# Patient Record
Sex: FEMALE | Race: WHITE | NOT HISPANIC OR LATINO | Employment: UNEMPLOYED | URBAN - METROPOLITAN AREA
[De-identification: names, ages, dates, MRNs, and addresses within clinical notes are randomized per-mention and may not be internally consistent; named-entity substitution may affect disease eponyms.]

---

## 2016-06-27 LAB
EXTERNAL ABO GROUPING: NORMAL
EXTERNAL RH FACTOR: POSITIVE

## 2016-08-17 LAB
EXTERNAL CHLAMYDIA SCREEN: NORMAL
EXTERNAL GONORRHEA SCREEN: NORMAL

## 2017-01-06 PROBLEM — F41.9 ANXIETY: Status: ACTIVE | Noted: 2017-01-06

## 2017-01-12 DIAGNOSIS — Z98.891 STATUS POST CESAREAN DELIVERY: Primary | ICD-10-CM

## 2017-01-30 ENCOUNTER — TELEPHONE (OUTPATIENT)
Dept: OBSTETRICS AND GYNECOLOGY | Facility: CLINIC | Age: 27
End: 2017-01-30

## 2017-01-30 NOTE — TELEPHONE ENCOUNTER
----- Message from Dottie Castro sent at 1/30/2017 10:16 AM EST -----  Regarding: [facility]  Contact: [phone]  DR MA PT LOST MUCUS PLUG DURING THE NIGHT NOW SHE IS HAVING CRAMPING/CONTRACTIONS THAT ARE NOT CONSISTANT. SHOULD SHE COME TO LABOR KIM OR WAIT    [phone] 37 wks 1 day pt states contractions are not regular, they are so far spaced out that she is not timing them. Advised pt to rest and increase fluids.

## 2017-01-31 ENCOUNTER — ANESTHESIA EVENT (OUTPATIENT)
Dept: LABOR AND DELIVERY | Facility: HOSPITAL | Age: 27
End: 2017-01-31

## 2017-01-31 ENCOUNTER — HOSPITAL ENCOUNTER (INPATIENT)
Facility: HOSPITAL | Age: 27
LOS: 4 days | Discharge: HOME | End: 2017-02-04
Attending: OBSTETRICS & GYNECOLOGY | Admitting: OBSTETRICS & GYNECOLOGY

## 2017-01-31 DIAGNOSIS — Z98.891 S/P CESAREAN SECTION: Primary | ICD-10-CM

## 2017-01-31 PROBLEM — Z37.9 NORMAL LABOR: Status: ACTIVE | Noted: 2017-01-31

## 2017-01-31 LAB
DEPRECATED RDW RBC AUTO: 41.6 FL (ref 37–54)
ERYTHROCYTE [DISTWIDTH] IN BLOOD BY AUTOMATED COUNT: 12.3 % (ref 11.3–14.5)
HCT VFR BLD AUTO: 37 % (ref 34.5–44)
HGB BLD-MCNC: 12.7 G/DL (ref 11.5–15.5)
MCH RBC QN AUTO: 32.2 PG (ref 27–31)
MCHC RBC AUTO-ENTMCNC: 34.3 G/DL (ref 32–36)
MCV RBC AUTO: 93.7 FL (ref 80–99)
PLATELET # BLD AUTO: 205 10*3/MM3 (ref 150–450)
PMV BLD AUTO: 8.5 FL (ref 6–12)
RBC # BLD AUTO: 3.95 10*6/MM3 (ref 3.89–5.14)
WBC NRBC COR # BLD: 15.28 10*3/MM3 (ref 3.5–10.8)

## 2017-01-31 PROCEDURE — 85025 COMPLETE CBC W/AUTO DIFF WBC: CPT | Performed by: OBSTETRICS & GYNECOLOGY

## 2017-01-31 PROCEDURE — 86900 BLOOD TYPING SEROLOGIC ABO: CPT

## 2017-01-31 PROCEDURE — 25010000002 DIPHENHYDRAMINE PER 50 MG: Performed by: ANESTHESIOLOGY

## 2017-01-31 PROCEDURE — 86901 BLOOD TYPING SEROLOGIC RH(D): CPT

## 2017-01-31 PROCEDURE — 25010000002 MIDAZOLAM PER 1 MG: Performed by: ANESTHESIOLOGY

## 2017-01-31 PROCEDURE — 86850 RBC ANTIBODY SCREEN: CPT

## 2017-01-31 PROCEDURE — 85007 BL SMEAR W/DIFF WBC COUNT: CPT | Performed by: OBSTETRICS & GYNECOLOGY

## 2017-01-31 PROCEDURE — 85027 COMPLETE CBC AUTOMATED: CPT | Performed by: OBSTETRICS & GYNECOLOGY

## 2017-01-31 PROCEDURE — 25010000002 METHYLPREDNISOLONE PER 125 MG: Performed by: ANESTHESIOLOGY

## 2017-01-31 PROCEDURE — 59025 FETAL NON-STRESS TEST: CPT

## 2017-01-31 RX ORDER — METHYLPREDNISOLONE SODIUM SUCCINATE 125 MG/2ML
INJECTION, POWDER, LYOPHILIZED, FOR SOLUTION INTRAMUSCULAR; INTRAVENOUS AS NEEDED
Status: DISCONTINUED | OUTPATIENT
Start: 2017-01-31 | End: 2017-02-01 | Stop reason: SURG

## 2017-01-31 RX ORDER — FLUTICASONE PROPIONATE 50 MCG
2 SPRAY, SUSPENSION (ML) NASAL DAILY
COMMUNITY
End: 2019-05-24

## 2017-01-31 RX ORDER — SODIUM CHLORIDE 0.9 % (FLUSH) 0.9 %
1-10 SYRINGE (ML) INJECTION AS NEEDED
Status: DISCONTINUED | OUTPATIENT
Start: 2017-01-31 | End: 2017-02-01 | Stop reason: HOSPADM

## 2017-01-31 RX ORDER — SODIUM CHLORIDE, SODIUM LACTATE, POTASSIUM CHLORIDE, CALCIUM CHLORIDE 600; 310; 30; 20 MG/100ML; MG/100ML; MG/100ML; MG/100ML
125 INJECTION, SOLUTION INTRAVENOUS CONTINUOUS
Status: DISCONTINUED | OUTPATIENT
Start: 2017-02-01 | End: 2017-02-01

## 2017-01-31 RX ORDER — CEFAZOLIN SODIUM 2 G/100ML
2 INJECTION, SOLUTION INTRAVENOUS ONCE
Status: COMPLETED | OUTPATIENT
Start: 2017-02-01 | End: 2017-02-01

## 2017-01-31 RX ORDER — DIPHENHYDRAMINE HYDROCHLORIDE 50 MG/ML
INJECTION INTRAMUSCULAR; INTRAVENOUS AS NEEDED
Status: DISCONTINUED | OUTPATIENT
Start: 2017-01-31 | End: 2017-02-01 | Stop reason: SURG

## 2017-01-31 RX ORDER — CETIRIZINE HYDROCHLORIDE 5 MG/1
5 TABLET ORAL DAILY
COMMUNITY
End: 2019-05-24

## 2017-01-31 RX ADMIN — MIDAZOLAM HYDROCHLORIDE 1 MG: 1 INJECTION, SOLUTION INTRAMUSCULAR; INTRAVENOUS at 23:57

## 2017-01-31 RX ADMIN — SODIUM CHLORIDE, POTASSIUM CHLORIDE, SODIUM LACTATE AND CALCIUM CHLORIDE: 600; 310; 30; 20 INJECTION, SOLUTION INTRAVENOUS at 23:56

## 2017-01-31 RX ADMIN — SODIUM CHLORIDE, POTASSIUM CHLORIDE, SODIUM LACTATE AND CALCIUM CHLORIDE 1000 ML: 600; 310; 30; 20 INJECTION, SOLUTION INTRAVENOUS at 23:23

## 2017-01-31 RX ADMIN — METHYLPREDNISOLONE SODIUM SUCCINATE 125 MG: 125 INJECTION, POWDER, FOR SOLUTION INTRAMUSCULAR; INTRAVENOUS at 23:50

## 2017-01-31 RX ADMIN — DIPHENHYDRAMINE HYDROCHLORIDE 25 MG: 50 INJECTION INTRAMUSCULAR; INTRAVENOUS at 23:47

## 2017-01-31 RX ADMIN — CEFAZOLIN SODIUM 2 G: 2 INJECTION, SOLUTION INTRAVENOUS at 23:37

## 2017-02-01 ENCOUNTER — ANESTHESIA (OUTPATIENT)
Dept: LABOR AND DELIVERY | Facility: HOSPITAL | Age: 27
End: 2017-02-01

## 2017-02-01 LAB
ABO GROUP BLD: NORMAL
BLD GP AB SCN SERPL QL: NEGATIVE
RH BLD: POSITIVE

## 2017-02-01 PROCEDURE — 59514 CESAREAN DELIVERY ONLY: CPT | Performed by: OBSTETRICS & GYNECOLOGY

## 2017-02-01 PROCEDURE — 25010000002 KETOROLAC TROMETHAMINE PER 15 MG: Performed by: OBSTETRICS & GYNECOLOGY

## 2017-02-01 PROCEDURE — 25010000002 ONDANSETRON PER 1 MG: Performed by: ANESTHESIOLOGY

## 2017-02-01 PROCEDURE — 25010000002 FENTANYL CITRATE (PF) 100 MCG/2ML SOLUTION: Performed by: ANESTHESIOLOGY

## 2017-02-01 PROCEDURE — 25010000003 CEFAZOLIN IN DEXTROSE 2-4 GM/100ML-% SOLUTION: Performed by: OBSTETRICS & GYNECOLOGY

## 2017-02-01 PROCEDURE — 25010000003 MORPHINE PER 10 MG: Performed by: ANESTHESIOLOGY

## 2017-02-01 RX ORDER — BISACODYL 10 MG
10 SUPPOSITORY, RECTAL RECTAL DAILY PRN
Status: DISCONTINUED | OUTPATIENT
Start: 2017-02-01 | End: 2017-02-04 | Stop reason: HOSPADM

## 2017-02-01 RX ORDER — DIPHENHYDRAMINE HCL 25 MG
25 CAPSULE ORAL EVERY 4 HOURS PRN
Status: DISCONTINUED | OUTPATIENT
Start: 2017-02-01 | End: 2017-02-04 | Stop reason: HOSPADM

## 2017-02-01 RX ORDER — OXYTOCIN/RINGER'S LACTATE 20/1000 ML
999 PLASTIC BAG, INJECTION (ML) INTRAVENOUS ONCE
Status: DISCONTINUED | OUTPATIENT
Start: 2017-02-01 | End: 2017-02-01 | Stop reason: HOSPADM

## 2017-02-01 RX ORDER — ONDANSETRON 4 MG/1
4 TABLET, FILM COATED ORAL EVERY 8 HOURS PRN
Status: DISCONTINUED | OUTPATIENT
Start: 2017-02-01 | End: 2017-02-01 | Stop reason: SDUPTHER

## 2017-02-01 RX ORDER — CARBOPROST TROMETHAMINE 250 UG/ML
250 INJECTION, SOLUTION INTRAMUSCULAR ONCE
Status: DISCONTINUED | OUTPATIENT
Start: 2017-02-01 | End: 2017-02-04 | Stop reason: HOSPADM

## 2017-02-01 RX ORDER — PROMETHAZINE HYDROCHLORIDE 25 MG/ML
12.5 INJECTION, SOLUTION INTRAMUSCULAR; INTRAVENOUS EVERY 6 HOURS PRN
Status: DISCONTINUED | OUTPATIENT
Start: 2017-02-01 | End: 2017-02-01

## 2017-02-01 RX ORDER — KETOROLAC TROMETHAMINE 15 MG/ML
15 INJECTION, SOLUTION INTRAMUSCULAR; INTRAVENOUS EVERY 6 HOURS PRN
Status: DISCONTINUED | OUTPATIENT
Start: 2017-02-01 | End: 2017-02-01

## 2017-02-01 RX ORDER — MAGNESIUM HYDROXIDE/ALUMINUM HYDROXICE/SIMETHICONE 120; 1200; 1200 MG/30ML; MG/30ML; MG/30ML
30 SUSPENSION ORAL EVERY 4 HOURS PRN
Status: DISCONTINUED | OUTPATIENT
Start: 2017-02-01 | End: 2017-02-04 | Stop reason: HOSPADM

## 2017-02-01 RX ORDER — ONDANSETRON 4 MG/1
4 TABLET, FILM COATED ORAL EVERY 6 HOURS PRN
Status: DISCONTINUED | OUTPATIENT
Start: 2017-02-01 | End: 2017-02-04 | Stop reason: HOSPADM

## 2017-02-01 RX ORDER — MISOPROSTOL 200 UG/1
600 TABLET ORAL ONCE
Status: DISCONTINUED | OUTPATIENT
Start: 2017-02-01 | End: 2017-02-04 | Stop reason: HOSPADM

## 2017-02-01 RX ORDER — PRENATAL WITH FERROUS FUM AND FOLIC ACID 3080; 920; 120; 400; 22; 1.84; 3; 20; 10; 1; 12; 200; 27; 25; 2 [IU]/1; [IU]/1; MG/1; [IU]/1; MG/1; MG/1; MG/1; MG/1; MG/1; MG/1; UG/1; MG/1; MG/1; MG/1; MG/1
1 TABLET ORAL DAILY
Status: DISCONTINUED | OUTPATIENT
Start: 2017-02-01 | End: 2017-02-04 | Stop reason: HOSPADM

## 2017-02-01 RX ORDER — KETOROLAC TROMETHAMINE 15 MG/ML
15 INJECTION, SOLUTION INTRAMUSCULAR; INTRAVENOUS EVERY 6 HOURS PRN
Status: COMPLETED | OUTPATIENT
Start: 2017-02-02 | End: 2017-02-02

## 2017-02-01 RX ORDER — CETIRIZINE HYDROCHLORIDE 10 MG/1
5 TABLET ORAL DAILY
Status: DISCONTINUED | OUTPATIENT
Start: 2017-02-01 | End: 2017-02-04 | Stop reason: HOSPADM

## 2017-02-01 RX ORDER — SODIUM CHLORIDE 0.9 % (FLUSH) 0.9 %
1-10 SYRINGE (ML) INJECTION AS NEEDED
Status: DISCONTINUED | OUTPATIENT
Start: 2017-02-01 | End: 2017-02-04 | Stop reason: HOSPADM

## 2017-02-01 RX ORDER — DIPHENHYDRAMINE HYDROCHLORIDE 50 MG/ML
25 INJECTION INTRAMUSCULAR; INTRAVENOUS EVERY 4 HOURS PRN
Status: DISCONTINUED | OUTPATIENT
Start: 2017-02-01 | End: 2017-02-01

## 2017-02-01 RX ORDER — HYDROXYZINE HYDROCHLORIDE 25 MG/1
50 TABLET, FILM COATED ORAL EVERY 6 HOURS PRN
Status: DISCONTINUED | OUTPATIENT
Start: 2017-02-01 | End: 2017-02-04 | Stop reason: HOSPADM

## 2017-02-01 RX ORDER — SIMETHICONE 80 MG
80 TABLET,CHEWABLE ORAL 4 TIMES DAILY PRN
Status: DISCONTINUED | OUTPATIENT
Start: 2017-02-01 | End: 2017-02-04 | Stop reason: HOSPADM

## 2017-02-01 RX ORDER — OXYTOCIN 10 [USP'U]/ML
INJECTION, SOLUTION INTRAMUSCULAR; INTRAVENOUS AS NEEDED
Status: DISCONTINUED | OUTPATIENT
Start: 2017-02-01 | End: 2017-02-01 | Stop reason: SURG

## 2017-02-01 RX ORDER — PROMETHAZINE HYDROCHLORIDE 25 MG/ML
12.5 INJECTION, SOLUTION INTRAMUSCULAR; INTRAVENOUS EVERY 6 HOURS PRN
Status: DISCONTINUED | OUTPATIENT
Start: 2017-02-01 | End: 2017-02-04 | Stop reason: HOSPADM

## 2017-02-01 RX ORDER — ACETAMINOPHEN 160 MG/5ML
650 SOLUTION ORAL EVERY 4 HOURS PRN
Status: DISCONTINUED | OUTPATIENT
Start: 2017-02-01 | End: 2017-02-04 | Stop reason: HOSPADM

## 2017-02-01 RX ORDER — METHYLERGONOVINE MALEATE 0.2 MG/ML
200 INJECTION INTRAVENOUS ONCE
Status: DISCONTINUED | OUTPATIENT
Start: 2017-02-01 | End: 2017-02-04 | Stop reason: HOSPADM

## 2017-02-01 RX ORDER — CEFAZOLIN SODIUM 2 G/100ML
2 INJECTION, SOLUTION INTRAVENOUS EVERY 8 HOURS
Status: COMPLETED | OUTPATIENT
Start: 2017-02-01 | End: 2017-02-01

## 2017-02-01 RX ORDER — IBUPROFEN 600 MG/1
600 TABLET ORAL EVERY 6 HOURS PRN
Status: DISCONTINUED | OUTPATIENT
Start: 2017-02-01 | End: 2017-02-01

## 2017-02-01 RX ORDER — METOCLOPRAMIDE HYDROCHLORIDE 5 MG/ML
10 INJECTION INTRAMUSCULAR; INTRAVENOUS EVERY 4 HOURS PRN
Status: DISCONTINUED | OUTPATIENT
Start: 2017-02-01 | End: 2017-02-01

## 2017-02-01 RX ORDER — HYDROCODONE BITARTRATE AND ACETAMINOPHEN 7.5; 325 MG/1; MG/1
1 TABLET ORAL EVERY 4 HOURS PRN
Status: DISCONTINUED | OUTPATIENT
Start: 2017-02-01 | End: 2017-02-01

## 2017-02-01 RX ORDER — ONDANSETRON 2 MG/ML
4 INJECTION INTRAMUSCULAR; INTRAVENOUS EVERY 6 HOURS PRN
Status: DISCONTINUED | OUTPATIENT
Start: 2017-02-01 | End: 2017-02-01 | Stop reason: SDUPTHER

## 2017-02-01 RX ORDER — HYDROMORPHONE HYDROCHLORIDE 1 MG/ML
0.5 INJECTION, SOLUTION INTRAMUSCULAR; INTRAVENOUS; SUBCUTANEOUS
Status: DISCONTINUED | OUTPATIENT
Start: 2017-02-01 | End: 2017-02-04 | Stop reason: HOSPADM

## 2017-02-01 RX ORDER — ONDANSETRON 2 MG/ML
4 INJECTION INTRAMUSCULAR; INTRAVENOUS EVERY 6 HOURS PRN
Status: DISCONTINUED | OUTPATIENT
Start: 2017-02-01 | End: 2017-02-04 | Stop reason: HOSPADM

## 2017-02-01 RX ORDER — FERROUS SULFATE 325(65) MG
325 TABLET ORAL
Status: DISCONTINUED | OUTPATIENT
Start: 2017-02-01 | End: 2017-02-04 | Stop reason: HOSPADM

## 2017-02-01 RX ORDER — BUPIVACAINE HYDROCHLORIDE 7.5 MG/ML
INJECTION, SOLUTION EPIDURAL; RETROBULBAR AS NEEDED
Status: DISCONTINUED | OUTPATIENT
Start: 2017-02-01 | End: 2017-02-01 | Stop reason: SURG

## 2017-02-01 RX ORDER — NALOXONE HCL 0.4 MG/ML
0.1 VIAL (ML) INJECTION
Status: DISCONTINUED | OUTPATIENT
Start: 2017-02-01 | End: 2017-02-04 | Stop reason: HOSPADM

## 2017-02-01 RX ORDER — ONDANSETRON 2 MG/ML
4 INJECTION INTRAMUSCULAR; INTRAVENOUS ONCE AS NEEDED
Status: DISCONTINUED | OUTPATIENT
Start: 2017-02-01 | End: 2017-02-01

## 2017-02-01 RX ORDER — ACETAMINOPHEN 650 MG/1
650 SUPPOSITORY RECTAL EVERY 4 HOURS PRN
Status: DISCONTINUED | OUTPATIENT
Start: 2017-02-01 | End: 2017-02-04 | Stop reason: HOSPADM

## 2017-02-01 RX ORDER — ACETAMINOPHEN 325 MG/1
650 TABLET ORAL EVERY 4 HOURS PRN
Status: DISCONTINUED | OUTPATIENT
Start: 2017-02-01 | End: 2017-02-04 | Stop reason: HOSPADM

## 2017-02-01 RX ORDER — HYDROCODONE BITARTRATE AND ACETAMINOPHEN 5; 325 MG/1; MG/1
1 TABLET ORAL EVERY 4 HOURS PRN
Status: DISCONTINUED | OUTPATIENT
Start: 2017-02-01 | End: 2017-02-01

## 2017-02-01 RX ORDER — CARBOPROST TROMETHAMINE 250 UG/ML
250 INJECTION, SOLUTION INTRAMUSCULAR AS NEEDED
Status: DISCONTINUED | OUTPATIENT
Start: 2017-02-01 | End: 2017-02-01 | Stop reason: HOSPADM

## 2017-02-01 RX ORDER — DOCUSATE SODIUM 100 MG/1
100 CAPSULE, LIQUID FILLED ORAL 2 TIMES DAILY PRN
Status: DISCONTINUED | OUTPATIENT
Start: 2017-02-01 | End: 2017-02-04 | Stop reason: HOSPADM

## 2017-02-01 RX ORDER — ZOLPIDEM TARTRATE 5 MG/1
5 TABLET ORAL NIGHTLY PRN
Status: DISCONTINUED | OUTPATIENT
Start: 2017-02-01 | End: 2017-02-04 | Stop reason: HOSPADM

## 2017-02-01 RX ORDER — MISOPROSTOL 200 UG/1
800 TABLET ORAL AS NEEDED
Status: DISCONTINUED | OUTPATIENT
Start: 2017-02-01 | End: 2017-02-01 | Stop reason: HOSPADM

## 2017-02-01 RX ORDER — METHYLERGONOVINE MALEATE 0.2 MG/ML
200 INJECTION INTRAVENOUS ONCE AS NEEDED
Status: DISCONTINUED | OUTPATIENT
Start: 2017-02-01 | End: 2017-02-01 | Stop reason: HOSPADM

## 2017-02-01 RX ORDER — OXYTOCIN/RINGER'S LACTATE 20/1000 ML
125 PLASTIC BAG, INJECTION (ML) INTRAVENOUS AS NEEDED
Status: DISCONTINUED | OUTPATIENT
Start: 2017-02-01 | End: 2017-02-01 | Stop reason: HOSPADM

## 2017-02-01 RX ORDER — LANOLIN 100 %
OINTMENT (GRAM) TOPICAL
Status: DISCONTINUED | OUTPATIENT
Start: 2017-02-01 | End: 2017-02-04 | Stop reason: HOSPADM

## 2017-02-01 RX ORDER — ONDANSETRON 2 MG/ML
INJECTION INTRAMUSCULAR; INTRAVENOUS AS NEEDED
Status: DISCONTINUED | OUTPATIENT
Start: 2017-02-01 | End: 2017-02-01 | Stop reason: SURG

## 2017-02-01 RX ORDER — DIPHENHYDRAMINE HCL 25 MG
25 CAPSULE ORAL EVERY 4 HOURS PRN
Status: DISCONTINUED | OUTPATIENT
Start: 2017-02-01 | End: 2017-02-01

## 2017-02-01 RX ORDER — NALOXONE HCL 0.4 MG/ML
0.4 VIAL (ML) INJECTION
Status: DISCONTINUED | OUTPATIENT
Start: 2017-02-01 | End: 2017-02-01

## 2017-02-01 RX ORDER — PRENATAL WITH FERROUS FUM AND FOLIC ACID 3080; 920; 120; 400; 22; 1.84; 3; 20; 10; 1; 12; 200; 27; 25; 2 [IU]/1; [IU]/1; MG/1; [IU]/1; MG/1; MG/1; MG/1; MG/1; MG/1; MG/1; UG/1; MG/1; MG/1; MG/1; MG/1
1 TABLET ORAL DAILY
Status: DISCONTINUED | OUTPATIENT
Start: 2017-02-01 | End: 2017-02-01 | Stop reason: SDUPTHER

## 2017-02-01 RX ORDER — MORPHINE SULFATE 0.5 MG/ML
INJECTION, SOLUTION EPIDURAL; INTRATHECAL; INTRAVENOUS AS NEEDED
Status: DISCONTINUED | OUTPATIENT
Start: 2017-02-01 | End: 2017-02-01 | Stop reason: SURG

## 2017-02-01 RX ORDER — FENTANYL CITRATE 50 UG/ML
INJECTION, SOLUTION INTRAMUSCULAR; INTRAVENOUS AS NEEDED
Status: DISCONTINUED | OUTPATIENT
Start: 2017-02-01 | End: 2017-02-01 | Stop reason: SURG

## 2017-02-01 RX ORDER — PROMETHAZINE HYDROCHLORIDE 25 MG/1
25 TABLET ORAL EVERY 6 HOURS PRN
Status: DISCONTINUED | OUTPATIENT
Start: 2017-02-01 | End: 2017-02-04 | Stop reason: HOSPADM

## 2017-02-01 RX ORDER — PROMETHAZINE HYDROCHLORIDE 12.5 MG/1
12.5 SUPPOSITORY RECTAL EVERY 6 HOURS PRN
Status: DISCONTINUED | OUTPATIENT
Start: 2017-02-01 | End: 2017-02-04 | Stop reason: HOSPADM

## 2017-02-01 RX ORDER — MIDAZOLAM HYDROCHLORIDE 1 MG/ML
INJECTION INTRAMUSCULAR; INTRAVENOUS AS NEEDED
Status: DISCONTINUED | OUTPATIENT
Start: 2017-01-31 | End: 2017-02-01 | Stop reason: SURG

## 2017-02-01 RX ORDER — SODIUM CHLORIDE, SODIUM LACTATE, POTASSIUM CHLORIDE, CALCIUM CHLORIDE 600; 310; 30; 20 MG/100ML; MG/100ML; MG/100ML; MG/100ML
125 INJECTION, SOLUTION INTRAVENOUS CONTINUOUS
Status: DISCONTINUED | OUTPATIENT
Start: 2017-02-01 | End: 2017-02-04 | Stop reason: HOSPADM

## 2017-02-01 RX ORDER — FLUTICASONE PROPIONATE 50 MCG
2 SPRAY, SUSPENSION (ML) NASAL DAILY
Status: DISCONTINUED | OUTPATIENT
Start: 2017-02-01 | End: 2017-02-04 | Stop reason: HOSPADM

## 2017-02-01 RX ADMIN — FLUTICASONE PROPIONATE 2 SPRAY: 50 SPRAY, METERED NASAL at 10:08

## 2017-02-01 RX ADMIN — FENTANYL CITRATE 20 MCG: 50 INJECTION, SOLUTION INTRAMUSCULAR; INTRAVENOUS at 00:05

## 2017-02-01 RX ADMIN — BUPIVACAINE HYDROCHLORIDE 1.4 ML: 7.5 INJECTION, SOLUTION EPIDURAL; RETROBULBAR at 00:05

## 2017-02-01 RX ADMIN — KETOROLAC TROMETHAMINE 15 MG: 15 INJECTION, SOLUTION INTRAMUSCULAR; INTRAVENOUS at 12:47

## 2017-02-01 RX ADMIN — OXYTOCIN 30 UNITS: 10 INJECTION, SOLUTION INTRAMUSCULAR; INTRAVENOUS at 00:20

## 2017-02-01 RX ADMIN — PRENATAL WITH FERROUS FUM AND FOLIC ACID 1 TABLET: 3080; 920; 120; 400; 22; 1.84; 3; 20; 10; 1; 12; 200; 27; 25; 2 TABLET ORAL at 10:10

## 2017-02-01 RX ADMIN — OXYTOCIN 20 UNITS: 10 INJECTION, SOLUTION INTRAMUSCULAR; INTRAVENOUS at 00:40

## 2017-02-01 RX ADMIN — MORPHINE SULFATE 0.1 MG: 0.5 INJECTION, SOLUTION EPIDURAL; INTRATHECAL; INTRAVENOUS at 00:05

## 2017-02-01 RX ADMIN — ONDANSETRON 4 MG: 2 INJECTION INTRAMUSCULAR; INTRAVENOUS at 00:10

## 2017-02-01 RX ADMIN — CEFAZOLIN SODIUM 2 G: 2 INJECTION, SOLUTION INTRAVENOUS at 15:37

## 2017-02-01 RX ADMIN — SODIUM CHLORIDE, POTASSIUM CHLORIDE, SODIUM LACTATE AND CALCIUM CHLORIDE 125 ML/HR: 600; 310; 30; 20 INJECTION, SOLUTION INTRAVENOUS at 05:17

## 2017-02-01 RX ADMIN — SODIUM CHLORIDE, POTASSIUM CHLORIDE, SODIUM LACTATE AND CALCIUM CHLORIDE: 600; 310; 30; 20 INJECTION, SOLUTION INTRAVENOUS at 00:20

## 2017-02-01 RX ADMIN — CETIRIZINE HYDROCHLORIDE 5 MG: 10 TABLET, FILM COATED ORAL at 10:06

## 2017-02-01 RX ADMIN — KETOROLAC TROMETHAMINE 15 MG: 15 INJECTION, SOLUTION INTRAMUSCULAR; INTRAVENOUS at 18:52

## 2017-02-01 RX ADMIN — CEFAZOLIN SODIUM 2 G: 2 INJECTION, SOLUTION INTRAVENOUS at 10:06

## 2017-02-01 RX ADMIN — Medication 325 MG: at 10:07

## 2017-02-01 RX ADMIN — DOCUSATE SODIUM 100 MG: 100 CAPSULE, LIQUID FILLED ORAL at 09:51

## 2017-02-01 RX ADMIN — SODIUM CHLORIDE, POTASSIUM CHLORIDE, SODIUM LACTATE AND CALCIUM CHLORIDE: 600; 310; 30; 20 INJECTION, SOLUTION INTRAVENOUS at 00:40

## 2017-02-01 RX ADMIN — KETOROLAC TROMETHAMINE 15 MG: 15 INJECTION, SOLUTION INTRAMUSCULAR; INTRAVENOUS at 05:17

## 2017-02-01 NOTE — H&P
[facility]  Obstetric History and Physical    Chief Complaint   Patient presents with   • Contractions       Subjective     Patient is a 26 y.o. female  currently at 37w2d, who presents with contractions.    Her prenatal care is complicated by  prior mymectomy X 2.  Her previous obstetric/gynecological history is noted for is non-contributory.    The following portions of the patients history were reviewed and updated as appropriate: current medications, allergies, past medical history, past surgical history, past family history and past social history .       Prenatal Information:  Prenatal Results         1st Trimester Ref. Range Date Time   CBC with auto diff ^ 13.4 / 39.5 / PLT - 226   16    Rubella IgG ^ Immune   16    Hepatitis B SAg ^ Negative   16    RPR ^ Non-Reactive   16    ABO  O   16   Rh  Positive   16   Anibody Screen ^ Normal  (A) Abnormal 16    HIV ^ Negative   16    Varicella IgG       Urinalysis with microscopy       Urine Culture       GC/Chlamydia/TV       ThinPrep/Pap       2nd and 3rd Trimester Ref. Range Date Time   Hemoglobin / Hematocrit  40 % 37 - 47 % 16 0733   Hemoglobin  13.6 g/dL 12.0 - 16.0 g/dL 16 0733   Group B Strep Culture       Glucose Challege Test 1 hour ^ 94   16    Glucose Tolerance Test 3 hours       Pre-eclampsia Panel       Risk Screening Ref. Range Date Time   Fetal Fibronectin       Amnisure       Hepatitis C Antibody       Hemoglobin electrophoresis       Cystic Fibrosis       Hemoglobin A1C  5.1 % 4.5 - 5.7 % 07/09/15 1154   MSAFP - 4       NIPT       AFP       Parvovirus IgG       Parvovirus IgM       POCT - glucose       [facility]       24 Hour urine - Total protein       24 Hour urine - Creatinine clearance       Urinalysis with microscopy       Urine Culture       Drug Screening Ref. Range Date Time   Amphetamine Screen       Barbiturate Screen       Benzodiazepine Screen        Methadone Screen       Phencyclidine Screen       Opiates Screen       THC Screen       Cocaine Screen       Amphetamine Screen       Propoxyphene Screen       Buprenorphine Screen       Methamphetamine Screen       Oxycodone Screen       Tryicyclic Antidepressants Screen              Legend: ^: Historical            View all results for this pregnancy        External Prenatal Results         Pregnancy Outside Results - these were transcribed from office records.  See scanned records for details. Date Time   Hgb      Hct      ABO ^ O  16    Rh ^ Positive  16    Antibody Screen ^ Normal  (A) 16    Glucose Fasting GTT      Glucose Tolerance Test 1 hour ^ 94  16    Glucose Tolerance Test 3 hour      Gonorrhea (discrete) ^ NEG  16    Chlamydia (discrete) ^ NEG  16    RPR ^ Non-Reactive  16    VDRL      Syphillis Antibody      Rubella ^ Immune  16    HBsAg ^ Negative  16    Herpes Simplex Virus PCR      Herpes Simplex VIrus Culture      HIV ^ Negative  16    Hep C RNA Quant PCR      Hep C Antibody      Urine Drug Screen      AFP      Group B Strep      GBS Susceptibility to Clindamycin      GBS Susceptibility to Eythromycin      Fetal Fibronectin      Genetic Testing, Maternal Blood             Legend: ^: Historical           Past OB History:     Obstetric History       T0      TAB0   SAB1   E0   M0   L0       # Outcome Date GA Lbr Des/2nd Weight Sex Delivery Anes PTL Lv   2 Current            1 SAB 01/31/10 6w0d                 Past Medical History: Past Medical History   Diagnosis Date   • Anxiety    • Depression    • Endometriosis determined by laparoscopy      History of 3 laparoscopies Dr. Evgeny Varela   • Hypertension      H/O      Past Surgical History Past Surgical History   Procedure Laterality Date   • Diagnostic laparoscopy       TIMES 4   • Myomectomy  ,       Family History: Family History   Problem Relation Age of  Onset   • Colon cancer Father    • Osteoporosis Paternal Grandmother    • Breast cancer Maternal Aunt    • Ovarian cancer Other       Social History:  reports that she has never smoked. She does not have any smokeless tobacco history on file.   reports that she drinks about 0.6 oz of alcohol per week    reports that she does not use illicit drugs.        Review of Systems      Objective     Vital Signs Range for the last 24 hours  Temperature: Temp:  [98.2 °F (36.8 °C)] 98.2 °F (36.8 °C)   Temp Source: Temp src: Oral   BP:     Pulse:     Respirations: Resp:  [18] 18   SPO2:     O2 Amount (l/min):     O2 Devices     Weight: Weight:  [203 lb (92.1 kg)] 203 lb (92.1 kg)     Physical Examination: General appearance - alert, well appearing, and in no distress  Chest - clear to auscultation, no wheezes, rales or rhonchi, symmetric air entry  Heart - normal rate, regular rhythm, normal S1, S2, no murmurs, rubs, clicks or gallops  Abdomen - soft, nontender, nondistended, no masses or organomegaly  no rebound tenderness noted  Extremities - peripheral pulses normal, no pedal edema, no clubbing or cyanosis, no pedal edema noted  Skin - normal coloration and turgor, no rashes, no suspicious skin lesions noted    Presentation: vertex   Cervix: Exam by: Method: sterile exam per RN   Dilation: Dilation: 1.5   Effacement: Cervical Effacement: 60-70%   Station: Station: 0     Fetal Heart Rate Assessment   Method:     Beats/min:     Baseline:     Varibility:     Accels:     Decels:     Tracing Category:       Uterine Assessment   Method:     Frequency (min):     Ctx Count in 10 min:     Duration:     Intensity:     Intensity by IUPC:     Resting Tone:     Resting Tone by IUPC:     [place] Units:       Laboratory Results:   Radiology Review:   Other Studies:     Assessment/Plan     Active Problems:    * No active hospital problems. *      Assessment & Plan    Assessment:  1.  Intrauterine pregnancy at 37w2d weeks gestation with  reactive, reassuring fetal status.    2.  labor  without ROM  3.  Obstetrical history significant for is remarkable for prev myomect X 2  4.  GBS status: No results found for: GBSANTIGEN    Plan:  1. Primary   2. Plan of care has been reviewed with patient and   3.  Risks, benefits of treatment plan have been discussed.  4.  All questions have been answered.  5.        Ranulfo Harrison MD  2017  11:16 PM

## 2017-02-01 NOTE — LACTATION NOTE
"This note was copied from a baby's chart.     02/01/17 1430   Maternal Information   Person Making Referral physician   Infant Reason for Referral low birth weight   Maternal Infant Assessment   Size Issue, Bilateral Breasts no   Nipple Conditions, Bilateral intact   Infant Assessment   Sucking Reflex present   Rooting Reflex present   Swallow Reflex present   LATCH Score   Latch 2-->grasps breast, tongue down, lips flanged, rhythmic sucking   Audible Swallowing 1-->a few with stimulation   Type Of Nipple 2-->everted (after stimulation)   Comfort (Breast/Nipple) 2-->soft/nontender   Hold (Positioning) 1-->minimal assist, teach one side: mother does other, staff holds   Score (less than 7 for 2/more consecutive times, consult Lactation Consultant) 8   Maternal Infant Feeding   Previous Breastfeeding History no   Infant Positioning cross-cradle;clutch/\"football\"   Nipple Shape After Feeding, Left Breast round;symmetrical   Nipple Shape After Feeding, Right Breast round;symmetrical   Latch Assistance yes   Feeding Infant   Feeding Readiness Cues rooting   Effective Latch During Feeding yes   Audible Swallow yes   Suck/Swallow Coordination present   Skin-to-Skin Contact During Feeding yes   Equipment Type/Education   Breast Pump Type double electric, hospital grade     "

## 2017-02-01 NOTE — OP NOTE
Operative Note    Patient name: Gunjan Engel  YOB: 1990   MRN: [id number]  Admission Date: 2017    ID: 26 y.o.  at 37w3d    Preoperative Diagnosis:   Patient Active Problem List   Diagnosis   • Post partum depression   • S/P myomectomy   • Endometriosis determined by laparoscopy   • Anxiety   • Normal labor       Postoperative Diagnosis: Same as above.    Procedure(s): primarylow transverse  delivery    Procedure(s):   SECTION REPEAT    Surgeons: Surgeon(s) and Role:     * Cheikh Bonilla MD - Primary     * Ranulfo Harrison MD - Assisting    Assistant: Circulator: Hoda Perry RN  Scrub Person: Mariel Maynard  Baby Nurse: Jailene Reese RN     Anesthesia: Spinal    Estimated Blood Loss: 1000 mL mL    IV Fluids: [unfilled]    Preoperative antibiotic: cefazolin (Ancef)    Blood products:   Blood Administration Record     None          Pathology: * No orders in the log *    Drains: Pineda catheter to gravity clear urine    Complications: None    Condition: Stable to recovery room                                          Infant:                 Gender: male  infant    Weight: No birth weight on file.     Apgars: 6   @ 1 minute /     8   @ 5 minutes    Cord gases: Venous:  @BABYNOHDR(BRIEFLAB, PHCVEN, BECVEN)@     Arterial:  @BABYNOHDR(BRIEFLAB, PHCART, BECART)@       Operative Summary:   After obtaining informed consent the patient was taken to the operating room where adequate anesthesia was obtained.  Pineda catheter was placed to straight drainage.  IV antibiotics were given preoperatively.       The abdomen was prepped and draped in the usual sterile fashion.  With an  adequate level of anesthesia, a Pfannenstiel skin incision was made with the scalpel and carried through  the adipose layer to the fascia.  The fascia was incised in the midline  extended laterally with the Mitchell scissors and/or blunt dissection.       The upper aspect of the fascia was grasped with 2 Kocher  clamps, elevated, and dissected off the underlying rectus muscles bluntly and with the Mitchell scissors.  This was repeated on the inferior aspect of the fascia.  The peritoneum was entered bluntly.  Bladder blade and Herr retractors were inserted .       The uterus was incised with the scalpel in a low transverse fashion  and extended manually.  Membranes were ruptured.  The infant was delivered atraumatically from maura breech presentation.  The umbilical cord was clamped and cut and the nose and mouth bulb suctioned.  The infant was handed off to the delivery team.       The placenta was manually removed.  The uterus was exteriorized and  moist lap sponges were used to clean the uterine lining. The uterine incision was repaired with #1 chromic in a running locked fashion.      Adequate hemostasis was achieved.  The uterus, tubes and ovaries were noted to be normal.  There was a defect on the left fundus c/w myomectomy scar. The uterus was returned to the abdomen.  The gutters were cleared of all clots and debris.  The uterine incision remained hemostatic.   The fascia was closed with a running 0 PDS II.  The subcutaneous space was reapproximated using 0 chromic.  The skin was closed using stapler.  The patient was taken to the recovery room in stable condition.    Cheikh Bonilla MD

## 2017-02-01 NOTE — PLAN OF CARE
Problem: Patient Care Overview (Adult)  Goal: Plan of Care Review  Outcome: Ongoing (interventions implemented as appropriate)    17 0549   Coping/Psychosocial Response Interventions   Plan Of Care Reviewed With patient;significant other   Patient Care Overview   Progress improving       Goal: Adult Individualization and Mutuality  Outcome: Ongoing (interventions implemented as appropriate)  Goal: Discharge Needs Assessment  Outcome: Ongoing (interventions implemented as appropriate)    Problem: Postpartum ( Delivery) (Adult)  Goal: Signs and Symptoms of Listed Potential Problems Will be Absent or Manageable (Postpartum)  Outcome: Ongoing (interventions implemented as appropriate)

## 2017-02-01 NOTE — ANESTHESIA PREPROCEDURE EVALUATION
Anesthesia Evaluation     Patient summary reviewed and Nursing notes reviewed    Airway   Mallampati: II  Neck ROM: full  possible difficult intubation  Dental      Pulmonary - negative pulmonary ROS   Cardiovascular - negative cardio ROS    Neuro/Psych- negative ROS  GI/Hepatic/Renal/Endo - negative ROS     Musculoskeletal (-) negative ROS    Abdominal    Substance History - negative use     OB/GYN          Other - negative ROS                            Anesthesia Plan    ASA 2 - emergent     spinal and ITN   (Full stomach)  Anesthetic plan and risks discussed with patient and spouse/significant other.

## 2017-02-01 NOTE — ANESTHESIA PROCEDURE NOTES
Spinal Block    Patient location during procedure: OB  Indication:procedure for pain  Performed By  Anesthesiologist: JAYLON ESTRADA  Preanesthetic Checklist  Completed: patient identified, surgical consent, pre-op evaluation, timeout performed, IV checked, risks and benefits discussed and monitors and equipment checked  Spinal Block Prep:  Patient Position:sitting  Sterile Tech:cap, gloves, mask and sterile barriers  Prep:DuraPrep  Patient Monitoring:blood pressure monitoring and EKG  Spinal Block Procedure  Approach:midline  Guidance:palpation technique  Location:L3-L4  Needle Type:Tonny  Needle Gauge:25 G  Placement of Spinal needle event:cerebrospinal fluid  Fluid Appearance:clear  Post Assessment  Patient Tolerance:patient tolerated the procedure well with no apparent complications  Complications no  Additional Notes  Easy placement

## 2017-02-01 NOTE — ANESTHESIA POSTPROCEDURE EVALUATION
Patient: Gunjan Engel    Procedure Summary     Date Anesthesia Start Anesthesia Stop Room / Location    17 5566  [facility]  [facility]       Procedure Diagnosis Surgeon Provider     SECTION REPEAT (N/A Abdomen) Status post  delivery  (Status post  delivery [Z98.891]) MD Bella Del Cid,           Anesthesia Type: spinal, ITN  Last vitals  BP   120/71   Temp   37 C   Pulse  102   Resp   16   SpO2   98%     Post Anesthesia Care and Evaluation    Patient location during evaluation: bedside  Patient participation: complete - patient participated  Level of consciousness: awake  Pain score: 0  Pain management: satisfactory to patient  Airway patency: patent  Anesthetic complications: No anesthetic complications    Cardiovascular status: acceptable  Respiratory status: acceptable  Hydration status: acceptable

## 2017-02-01 NOTE — PROGRESS NOTES
2017    Name:Gunjan Engel    MR#:[id number]     PROGRESS NOTE:  Day of delivery S/P        Subjective   26 y.o. yo Female  s/p CS at 37w3d doing well. Pain well controlled. Normal GI function. Lochia normal.     Patient Active Problem List   Diagnosis   • Post partum depression   • S/P myomectomy   • Endometriosis determined by laparoscopy   • Anxiety   • Normal labor        Objective    Vitals  Temp:  Temp:  [97.6 °F (36.4 °C)-98.8 °F (37.1 °C)] 98.3 °F (36.8 °C)  Temp src: Oral  BP:  BP: (105-144)/(58-96) 105/59  Pulse:  Heart Rate:  [83-96] 84  RR:   Resp:  [18] 18    General Awake, alert, no distress  Abdomen Soft, non-distended, fundus firm, below umbilicus, appropriately tender  Incision  Dressing dry  Extremities Calves NT bilaterally     I/O last 3 completed shifts:  In: 2000 [I.V.:2000]  Out: 2300 [Urine:1300; Blood:1000]    LABS:   Lab Results   Component Value Date    WBC 15.28 (H) 2017    HGB 12.7 2017    HCT 37.0 2017    MCV 93.7 2017     2017       Infant: male       Assessment   1.  Day of delivery from c/s    Plan: ambulate etc            Cheikh Bonilla MD  2017 8:32 AM

## 2017-02-01 NOTE — PROGRESS NOTES
Sharon is in early labor- had been scheduled for 1 section because of  myomectomy . Will proceed with tonight, reactive pattern.  Questions answered.

## 2017-02-01 NOTE — ANESTHESIA POSTPROCEDURE EVALUATION
Patient: Gunjan Engel    Procedure Summary     Date Anesthesia Start Anesthesia Stop Room / Location    17 2356 0045 (17)  [facility] /  [facility]       Procedure Diagnosis Surgeon Provider     SECTION REPEAT (Bilateral Abdomen) Status post  delivery  (Status post  delivery [Z98.891]) MD Bella Del Cid,           Anesthesia Type: spinal, ITN  Last vitals  BP      Temp      Pulse     Resp      SpO2        Post Anesthesia Care and Evaluation    Patient location during evaluation: bedside  Patient participation: complete - patient participated  Level of consciousness: awake and alert  Pain management: adequate  Airway patency: patent  Anesthetic complications: No anesthetic complications    Cardiovascular status: acceptable  Respiratory status: acceptable  Hydration status: acceptable  Post Neuraxial Block status: Motor and sensory function returned to baseline and No signs or symptoms of PDPH

## 2017-02-02 LAB
BASOPHILS # BLD AUTO: 0.02 10*3/MM3 (ref 0–0.2)
BASOPHILS NFR BLD AUTO: 0.1 % (ref 0–1)
BURR CELLS BLD QL SMEAR: NORMAL
DEPRECATED RDW RBC AUTO: 43.8 FL (ref 37–54)
EOSINOPHIL # BLD AUTO: 0.12 10*3/MM3 (ref 0.1–0.3)
EOSINOPHIL NFR BLD AUTO: 0.8 % (ref 0–3)
ERYTHROCYTE [DISTWIDTH] IN BLOOD BY AUTOMATED COUNT: 12.7 % (ref 11.3–14.5)
HCT VFR BLD AUTO: 38.6 % (ref 34.5–44)
HGB BLD-MCNC: 12.7 G/DL (ref 11.5–15.5)
IMM GRANULOCYTES # BLD: 0.11 10*3/MM3 (ref 0–0.03)
IMM GRANULOCYTES NFR BLD: 0.7 % (ref 0–0.6)
LYMPHOCYTES # BLD AUTO: 2.86 10*3/MM3 (ref 0.6–4.8)
LYMPHOCYTES NFR BLD AUTO: 19.1 % (ref 24–44)
MCH RBC QN AUTO: 31.6 PG (ref 27–31)
MCHC RBC AUTO-ENTMCNC: 32.9 G/DL (ref 32–36)
MCV RBC AUTO: 96 FL (ref 80–99)
MONOCYTES # BLD AUTO: 1.16 10*3/MM3 (ref 0–1)
MONOCYTES NFR BLD AUTO: 7.7 % (ref 0–12)
NEUTROPHILS # BLD AUTO: 10.72 10*3/MM3 (ref 1.5–8.3)
NEUTROPHILS NFR BLD AUTO: 71.6 % (ref 41–71)
PLAT MORPH BLD: NORMAL
PLATELET # BLD AUTO: 229 10*3/MM3 (ref 150–450)
PMV BLD AUTO: 9.7 FL (ref 6–12)
POLYCHROMASIA BLD QL SMEAR: NORMAL
RBC # BLD AUTO: 4.02 10*6/MM3 (ref 3.89–5.14)
TOXIC GRANULATION: NORMAL
WBC NRBC COR # BLD: 14.99 10*3/MM3 (ref 3.5–10.8)

## 2017-02-02 PROCEDURE — 25010000002 KETOROLAC TROMETHAMINE PER 15 MG: Performed by: OBSTETRICS & GYNECOLOGY

## 2017-02-02 RX ORDER — KETOROLAC TROMETHAMINE 10 MG/1
10 TABLET, FILM COATED ORAL EVERY 6 HOURS PRN
Status: DISCONTINUED | OUTPATIENT
Start: 2017-02-02 | End: 2017-02-04 | Stop reason: HOSPADM

## 2017-02-02 RX ORDER — IBUPROFEN 600 MG/1
600 TABLET ORAL EVERY 6 HOURS PRN
Status: DISCONTINUED | OUTPATIENT
Start: 2017-02-02 | End: 2017-02-02 | Stop reason: SDUPTHER

## 2017-02-02 RX ADMIN — ACETAMINOPHEN 650 MG: 325 TABLET, FILM COATED ORAL at 01:13

## 2017-02-02 RX ADMIN — SIMETHICONE CHEW TAB 80 MG 80 MG: 80 TABLET ORAL at 17:39

## 2017-02-02 RX ADMIN — ACETAMINOPHEN 650 MG: 325 TABLET, FILM COATED ORAL at 17:38

## 2017-02-02 RX ADMIN — KETOROLAC TROMETHAMINE 10 MG: 10 TABLET, FILM COATED ORAL at 17:38

## 2017-02-02 RX ADMIN — DOCUSATE SODIUM 100 MG: 100 CAPSULE, LIQUID FILLED ORAL at 08:58

## 2017-02-02 RX ADMIN — Medication 325 MG: at 08:57

## 2017-02-02 RX ADMIN — ACETAMINOPHEN 650 MG: 325 TABLET, FILM COATED ORAL at 08:56

## 2017-02-02 RX ADMIN — KETOROLAC TROMETHAMINE 15 MG: 15 INJECTION, SOLUTION INTRAMUSCULAR; INTRAVENOUS at 01:11

## 2017-02-02 RX ADMIN — KETOROLAC TROMETHAMINE 10 MG: 10 TABLET, FILM COATED ORAL at 11:32

## 2017-02-02 RX ADMIN — DOCUSATE SODIUM 100 MG: 100 CAPSULE, LIQUID FILLED ORAL at 17:39

## 2017-02-02 RX ADMIN — SIMETHICONE CHEW TAB 80 MG 80 MG: 80 TABLET ORAL at 23:37

## 2017-02-02 RX ADMIN — SIMETHICONE CHEW TAB 80 MG 80 MG: 80 TABLET ORAL at 08:56

## 2017-02-02 RX ADMIN — PRENATAL WITH FERROUS FUM AND FOLIC ACID 1 TABLET: 3080; 920; 120; 400; 22; 1.84; 3; 20; 10; 1; 12; 200; 27; 25; 2 TABLET ORAL at 08:56

## 2017-02-02 RX ADMIN — KETOROLAC TROMETHAMINE 10 MG: 10 TABLET, FILM COATED ORAL at 23:51

## 2017-02-02 RX ADMIN — ACETAMINOPHEN 650 MG: 325 TABLET, FILM COATED ORAL at 23:37

## 2017-02-02 RX ADMIN — CETIRIZINE HYDROCHLORIDE 5 MG: 10 TABLET, FILM COATED ORAL at 08:58

## 2017-02-02 RX ADMIN — FLUTICASONE PROPIONATE 2 SPRAY: 50 SPRAY, METERED NASAL at 08:58

## 2017-02-02 NOTE — PROGRESS NOTES
2017    Name:Gunjan Engel    MR#:[id number]     PROGRESS NOTE:  Post-Op 1 S/P        Subjective   26 y.o. yo Female  s/p CS at 37w3d doing well. Pain well controlled.she can't take Lortab or percocet so she wants toradal. Normal GI function. Lochia normal.     Patient Active Problem List   Diagnosis   • Post partum depression   • S/P myomectomy   • Endometriosis determined by laparoscopy   • Anxiety   • Normal labor        Objective    Vitals  Temp:  Temp:  [97.7 °F (36.5 °C)-98.5 °F (36.9 °C)] 98.3 °F (36.8 °C)  Temp src: Oral  BP:  BP: (107-122)/(57-72) 113/61  Pulse:  Heart Rate:  [68-98] 87  RR:   Resp:  [16-18] 16    General Awake, alert, no distress  Abdomen Soft, non-distended, fundus firm, below umbilicus, appropriately tender  Incision  Intact, no erythema or exudate  Extremities Calves NT bilaterally     I/O last 3 completed shifts:  In: 2000 [I.V.:2000]  Out: 5275 [Urine:4275; Blood:1000]    LABS:   Lab Results   Component Value Date    WBC 15.28 (H) 2017    HGB 12.7 2017    HCT 37.0 2017    MCV 93.7 2017     2017       Infant: male       Assessment   1.  POD 1 from c/s    Plan: remove dressing, ambulate           Cheikh Bonilla MD  2017 8:12 AM

## 2017-02-02 NOTE — LACTATION NOTE
"   02/01/17 2015   Maternal Information   Person Making Referral patient   Maternal Reason for Referral breastfeeding currently   Maternal Infant Assessment   Size Issue, Bilateral Breasts no   Shape, Bilateral Breasts round   Density, Bilateral Breasts soft   Nipples, Bilateral graspable   Nipple Conditions, Bilateral intact   Additional Documentation (Latch) LATCH Score (Group)   Infant Assessment   Sucking Reflex present   Rooting Reflex present   Swallow Reflex present   LATCH Score   Latch 2-->grasps breast, tongue down, lips flanged, rhythmic sucking   Audible Swallowing 1-->a few with stimulation   Type Of Nipple 2-->everted (after stimulation)   Comfort (Breast/Nipple) 2-->soft/nontender   Hold (Positioning) 1-->minimal assist, teach one side: mother does other, staff holds   Score (less than 7 for 2/more consecutive times, consult Lactation Consultant) 8   Maternal Infant Feeding   Maternal Emotional State assist needed   Previous Breastfeeding History no   Infant Positioning clutch/\"football\";cross-cradle   Signs of Milk Transfer audible swallow   Presence of Pain other (see comments)  (Denies.)   Nipple Shape After Feeding, Left Breast appropriately projected   Latch Assistance yes   Feeding Infant   Feeding Readiness Cues rooting   Satiety Cues infant releases breast   Disengagement Cues other (see comments)  (Burp/spit)   Effective Latch During Feeding yes   Audible Swallow yes   Suck/Swallow Coordination present     "

## 2017-02-02 NOTE — PLAN OF CARE
Problem: Patient Care Overview (Adult)  Goal: Plan of Care Review  Outcome: Ongoing (interventions implemented as appropriate)    02/02/17 1226   Coping/Psychosocial Response Interventions   Plan Of Care Reviewed With patient;significant other   Patient Care Overview   Progress progress toward functional goals as expected   Outcome Evaluation   Outcome Summary/Follow up Plan Continue to breastfeed/pump q 2-3 hours or sooner.

## 2017-02-02 NOTE — PLAN OF CARE
Problem: Patient Care Overview (Adult)  Goal: Plan of Care Review  Outcome: Ongoing (interventions implemented as appropriate)    17 6125   Coping/Psychosocial Response Interventions   Plan Of Care Reviewed With patient;significant other   Patient Care Overview   Progress improving       Goal: Adult Individualization and Mutuality  Outcome: Ongoing (interventions implemented as appropriate)  Goal: Discharge Needs Assessment  Outcome: Ongoing (interventions implemented as appropriate)    Problem: Postpartum ( Delivery) (Adult)  Goal: Signs and Symptoms of Listed Potential Problems Will be Absent or Manageable (Postpartum)  Outcome: Ongoing (interventions implemented as appropriate)    Problem: Breastfeeding (Adult,NICU,[place],Obstetrics,Pediatric)  Goal: Signs and Symptoms of Listed Potential Problems Will be Absent or Manageable (Breastfeeding)  Outcome: Ongoing (interventions implemented as appropriate)

## 2017-02-02 NOTE — PLAN OF CARE
Problem: Patient Care Overview (Adult)  Goal: Plan of Care Review    02/01/17 2059   Coping/Psychosocial Response Interventions   Plan Of Care Reviewed With patient;significant other   Patient Care Overview   Progress progress toward functional goals as expected   Outcome Evaluation   Outcome Summary/Follow up Plan Continue to feed on demand

## 2017-02-03 PROCEDURE — 99231 SBSQ HOSP IP/OBS SF/LOW 25: CPT | Performed by: OBSTETRICS & GYNECOLOGY

## 2017-02-03 RX ADMIN — ACETAMINOPHEN 650 MG: 325 TABLET, FILM COATED ORAL at 19:26

## 2017-02-03 RX ADMIN — ACETAMINOPHEN 650 MG: 325 TABLET, FILM COATED ORAL at 05:35

## 2017-02-03 RX ADMIN — ACETAMINOPHEN 650 MG: 325 TABLET, FILM COATED ORAL at 13:32

## 2017-02-03 RX ADMIN — KETOROLAC TROMETHAMINE 10 MG: 10 TABLET, FILM COATED ORAL at 21:55

## 2017-02-03 RX ADMIN — KETOROLAC TROMETHAMINE 10 MG: 10 TABLET, FILM COATED ORAL at 15:42

## 2017-02-03 RX ADMIN — CETIRIZINE HYDROCHLORIDE 5 MG: 10 TABLET, FILM COATED ORAL at 13:37

## 2017-02-03 RX ADMIN — Medication 325 MG: at 09:06

## 2017-02-03 RX ADMIN — SIMETHICONE CHEW TAB 80 MG 80 MG: 80 TABLET ORAL at 09:06

## 2017-02-03 RX ADMIN — KETOROLAC TROMETHAMINE 10 MG: 10 TABLET, FILM COATED ORAL at 09:27

## 2017-02-03 RX ADMIN — DOCUSATE SODIUM 100 MG: 100 CAPSULE, LIQUID FILLED ORAL at 09:06

## 2017-02-03 RX ADMIN — SIMETHICONE CHEW TAB 80 MG 80 MG: 80 TABLET ORAL at 13:32

## 2017-02-03 RX ADMIN — KETOROLAC TROMETHAMINE 10 MG: 10 TABLET, FILM COATED ORAL at 05:35

## 2017-02-03 RX ADMIN — FLUTICASONE PROPIONATE 2 SPRAY: 50 SPRAY, METERED NASAL at 13:37

## 2017-02-03 RX ADMIN — SIMETHICONE CHEW TAB 80 MG 80 MG: 80 TABLET ORAL at 19:26

## 2017-02-03 RX ADMIN — PRENATAL WITH FERROUS FUM AND FOLIC ACID 1 TABLET: 3080; 920; 120; 400; 22; 1.84; 3; 20; 10; 1; 12; 200; 27; 25; 2 TABLET ORAL at 09:06

## 2017-02-03 RX ADMIN — ACETAMINOPHEN 650 MG: 325 TABLET, FILM COATED ORAL at 09:27

## 2017-02-03 NOTE — PROGRESS NOTES
Continued Stay Note  [facility]     Patient Name: Gunjan Engel  MRN: [id number]  Today's Date: 2/3/2017    Admit Date: 1/31/2017          Discharge Plan       02/03/17 1559    Case Management/Social Work Plan    Plan MSW available    Additional Comments Spoke with pt re: h/o PPD. Pt states she has a pysch degree and is fine. Pt declined any resources.               Discharge Codes     None            BRITNEY Benitez

## 2017-02-03 NOTE — PLAN OF CARE
Problem: Patient Care Overview (Adult)  Goal: Plan of Care Review  Outcome: Ongoing (interventions implemented as appropriate)    17 1811   Coping/Psychosocial Response Interventions   Plan Of Care Reviewed With patient;spouse   Patient Care Overview   Progress improving       Goal: Adult Individualization and Mutuality  Outcome: Ongoing (interventions implemented as appropriate)  Goal: Discharge Needs Assessment  Outcome: Ongoing (interventions implemented as appropriate)    Problem: Postpartum ( Delivery) (Adult)  Goal: Signs and Symptoms of Listed Potential Problems Will be Absent or Manageable (Postpartum)  Outcome: Ongoing (interventions implemented as appropriate)    Problem: Breastfeeding (Adult,NICU,[place],Obstetrics,Pediatric)  Goal: Signs and Symptoms of Listed Potential Problems Will be Absent or Manageable (Breastfeeding)  Outcome: Ongoing (interventions implemented as appropriate)

## 2017-02-03 NOTE — PLAN OF CARE
Problem: Patient Care Overview (Adult)  Goal: Plan of Care Review  Outcome: Ongoing (interventions implemented as appropriate)    17 0606   Coping/Psychosocial Response Interventions   Plan Of Care Reviewed With patient   Patient Care Overview   Progress improving   Outcome Evaluation   Outcome Summary/Follow up Plan doing well, pain managed on toradol and tylenol         Problem: Postpartum ( Delivery) (Adult)  Goal: Signs and Symptoms of Listed Potential Problems Will be Absent or Manageable (Postpartum)  Outcome: Ongoing (interventions implemented as appropriate)    Problem: Breastfeeding (Adult,NICU,[place],Obstetrics,Pediatric)  Goal: Signs and Symptoms of Listed Potential Problems Will be Absent or Manageable (Breastfeeding)  Outcome: Ongoing (interventions implemented as appropriate)

## 2017-02-03 NOTE — PROGRESS NOTES
"  2/3/2017    Name:Gunjan Engel    MR#:[id number]     PROGRESS NOTE:  Post-Op 2 S/P        Subjective   26 y.o. yo Female  s/p CS at 37w3d doing well. Pain well controlled. Normal GI function, passing flatus. Lochia normal. Ambulating and voiding.  She is complaining of a burning pain associated with a midline bruise above her incision.    Patient Active Problem List   Diagnosis   • Post partum depression   • S/P myomectomy   • Endometriosis determined by laparoscopy   • Anxiety   • Normal labor        Objective    Vitals  Temp:  Temp:  [97.8 °F (36.6 °C)-98.4 °F (36.9 °C)] 98.4 °F (36.9 °C)     BP:  BP: (110-131)/(70-84) 110/70  Pulse:  Heart Rate:  [] 95  RR:   Resp:  [14-16] 16    General Awake, alert, no distress  Abdomen Soft, non-distended, fundus firm, below umbilicus, appropriately tender with ~ 5 cm purple bruise, soft   Incision  Intact, no erythema or exudate; skin edges are \"rolling under\" somewhat  Extremities Calves NT bilaterally     I/O last 3 completed shifts:  In: -   Out:  [Urine:]    LABS:   Lab Results   Component Value Date    WBC 15.28 (H) 2017    WBC 14.99 (H) 2017    HGB 12.7 2017    HGB 12.7 2017    HCT 37.0 2017    HCT 38.6 2017    MCV 93.7 2017    MCV 96.0 2017     2017     2017       Infant: male  circ done this am      Assessment   1.  POD 2 from c/s    Plan: Home tomorrow; may need glue to incision along with steristrips      Cheikh Bonilla MD  2/3/2017 7:59 AM      "

## 2017-02-04 VITALS
HEART RATE: 95 BPM | OXYGEN SATURATION: 100 % | TEMPERATURE: 97.9 F | HEIGHT: 63 IN | WEIGHT: 203 LBS | SYSTOLIC BLOOD PRESSURE: 118 MMHG | RESPIRATION RATE: 16 BRPM | DIASTOLIC BLOOD PRESSURE: 80 MMHG | BODY MASS INDEX: 35.97 KG/M2

## 2017-02-04 PROCEDURE — 99238 HOSP IP/OBS DSCHRG MGMT 30/<: CPT | Performed by: OBSTETRICS & GYNECOLOGY

## 2017-02-04 RX ORDER — HYDROXYZINE 50 MG/1
50 TABLET, FILM COATED ORAL EVERY 6 HOURS PRN
Qty: 15 TABLET | Refills: 0 | Status: SHIPPED | OUTPATIENT
Start: 2017-02-04 | End: 2019-05-24

## 2017-02-04 RX ORDER — KETOROLAC TROMETHAMINE 10 MG/1
10 TABLET, FILM COATED ORAL EVERY 6 HOURS PRN
Qty: 12 TABLET | Refills: 0 | Status: SHIPPED | OUTPATIENT
Start: 2017-02-04 | End: 2017-02-07

## 2017-02-04 RX ORDER — PSEUDOEPHEDRINE HCL 30 MG
100 TABLET ORAL 2 TIMES DAILY PRN
Qty: 60 CAPSULE | Refills: 0 | Status: SHIPPED | OUTPATIENT
Start: 2017-02-04 | End: 2019-05-24

## 2017-02-04 RX ADMIN — KETOROLAC TROMETHAMINE 10 MG: 10 TABLET, FILM COATED ORAL at 09:49

## 2017-02-04 RX ADMIN — Medication 325 MG: at 07:50

## 2017-02-04 RX ADMIN — KETOROLAC TROMETHAMINE 10 MG: 10 TABLET, FILM COATED ORAL at 04:15

## 2017-02-04 RX ADMIN — ACETAMINOPHEN 650 MG: 325 TABLET, FILM COATED ORAL at 01:02

## 2017-02-04 RX ADMIN — ACETAMINOPHEN 650 MG: 325 TABLET, FILM COATED ORAL at 07:50

## 2017-02-04 RX ADMIN — FLUTICASONE PROPIONATE 2 SPRAY: 50 SPRAY, METERED NASAL at 08:08

## 2017-02-04 RX ADMIN — PRENATAL WITH FERROUS FUM AND FOLIC ACID 1 TABLET: 3080; 920; 120; 400; 22; 1.84; 3; 20; 10; 1; 12; 200; 27; 25; 2 TABLET ORAL at 07:51

## 2017-02-04 RX ADMIN — SIMETHICONE CHEW TAB 80 MG 80 MG: 80 TABLET ORAL at 07:51

## 2017-02-04 RX ADMIN — CETIRIZINE HYDROCHLORIDE 5 MG: 10 TABLET, FILM COATED ORAL at 08:09

## 2017-02-04 RX ADMIN — DOCUSATE SODIUM 100 MG: 100 CAPSULE, LIQUID FILLED ORAL at 07:51

## 2017-02-04 NOTE — LACTATION NOTE
This note was copied from a baby's chart.  Mom report doing well, offered services, nursing/supplementing/pumping, got 23ml last pumping

## 2017-02-04 NOTE — PLAN OF CARE
Problem: Breastfeeding (Adult,NICU,,Obstetrics,Pediatric)  Goal: Signs and Symptoms of Listed Potential Problems Will be Absent or Manageable (Breastfeeding)  Outcome: Outcome(s) achieved Date Met:  17 1143   Breastfeeding   Problems Assessed (Breastfeeding) all   Problems Present (Breastfeeding) none

## 2017-02-04 NOTE — PLAN OF CARE
Problem: Patient Care Overview (Adult)  Goal: Plan of Care Review  Outcome: Outcome(s) achieved Date Met:  02/04/17 02/04/17 1144   Coping/Psychosocial Response Interventions   Plan Of Care Reviewed With patient   Patient Care Overview   Progress improving

## 2017-02-04 NOTE — PLAN OF CARE
Problem: Breastfeeding (Adult,NICU,[place],Obstetrics,Pediatric)  Goal: Signs and Symptoms of Listed Potential Problems Will be Absent or Manageable (Breastfeeding)  Outcome: Ongoing (interventions implemented as appropriate)

## 2017-02-04 NOTE — DISCHARGE SUMMARY
Discharge Summary    Date of Admission: 2017  Date of Discharge:  2017      Patient: uGnjan Engel      MR#:[id number]    Primary Surgeon/OB: Cheikh Bonilla MD    Discharge Surgeon/OB:    Presenting Problem/History of Present Illness  Status post  delivery [Z98.891]  Normal labor [O80, Z37.9]     Patient Active Problem List   Diagnosis   • Post partum depression   • S/P myomectomy   • Endometriosis determined by laparoscopy   • Anxiety   • Normal labor         Discharge Diagnosis:  section at 37w3d    Procedures:  , Low Transverse     2017    12:18 AM      Feeding method: Breastfeeding Status: Yes    Rh Immune globulin given: not applicable    Rubella vaccine given: not applicable    Discharge Date: 2017; Discharge Time: 9:59 AM    Early Discharge:  YES-I AUTHORIZE ONE MATERNAL- HOME VISIT    Hospital Course  Patient is a 26 y.o. female  at 37w3d status post  section with uneventful postoperative recovery.  Patient was advanced to regular diet on postoperative day#1.  On discharge, ambulating, tolerating a regular diet without any difficulties and her incision is dry, clean and intact.     Infant:   male  fetus 5 lb 0.3 oz (2.275 kg)  with Apgar scores of 6  , 8   at five minutes.    Circumcision: no    Condition on Discharge:  Stable    Vital Signs  Temp:  [97.9 °F (36.6 °C)-98.5 °F (36.9 °C)] 97.9 °F (36.6 °C)  Heart Rate:  [88-95] 95  Resp:  [16] 16  BP: (118-130)/(74-85) 118/80    Lab Results   Component Value Date    WBC 15.28 (H) 2017    WBC 14.99 (H) 2017    HGB 12.7 2017    HGB 12.7 2017    HCT 37.0 2017    HCT 38.6 2017    MCV 93.7 2017    MCV 96.0 2017     2017     2017       Discharge Disposition: Home  Home or Self Care    Discharge Medications   Gunjan Engel Medication Instructions ALLIE:[id number]    Printed on:17 0959   Medication Information                       cetirizine (zyrTEC) 5 MG tablet  Take 5 mg by mouth Daily.             docusate sodium 100 MG capsule  Take 100 mg by mouth 2 (Two) Times a Day As Needed for constipation.             ferrous sulfate 324 (65 FE) MG tablet delayed-release EC tablet  Take 324 mg by mouth 2 (Two) Times a Day With Meals.             fluticasone (FLONASE) 50 MCG/ACT nasal spray  2 sprays into each nostril Daily.             hydrOXYzine (ATARAX) 50 MG tablet  Take 1 tablet by mouth Every 6 (Six) Hours As Needed for itching.             ketorolac (TORADOL) 10 MG tablet  Take 1 tablet by mouth Every 6 (Six) Hours As Needed for moderate pain (4-6) for up to 3 days.             Prenatal Vit-Fe Fumarate-FA (PRENATAL VITAMIN 27-0.8) 27-0.8 MG tablet tablet  Take 1 tablet by mouth Daily.                 Discharge Diet: Regular    Activity at Discharge: Routine    Follow-up Appointments  Future Appointments  Date Time Provider Department Center   2/13/2017 11:30 AM Cheikh Bonilla MD [facility] None         Jose Carlos Marie MD  02/04/17  9:59 AM

## 2017-02-04 NOTE — PLAN OF CARE
Problem: Patient Care Overview (Adult)  Goal: Adult Individualization and Mutuality  Outcome: Outcome(s) achieved Date Met:  02/04/17 02/04/17 0607   Individualization   Patient Specific Preferences Breastfeed   Patient Specific Goals Pain control   Patient Specific Interventions Medicate PRN

## 2017-02-04 NOTE — PLAN OF CARE
Problem: Postpartum ( Delivery) (Adult)  Goal: Signs and Symptoms of Listed Potential Problems Will be Absent or Manageable (Postpartum)  Outcome: Outcome(s) achieved Date Met:  17 1143   Postpartum ( Delivery)   Problems Assessed (Postpartum ) all   Problems Present (Postpartum ) none

## 2017-02-04 NOTE — PLAN OF CARE
Problem: Patient Care Overview (Adult)  Goal: Discharge Needs Assessment  Outcome: Outcome(s) achieved Date Met:  02/04/17

## 2017-02-10 ENCOUNTER — APPOINTMENT (OUTPATIENT)
Dept: PREADMISSION TESTING | Facility: HOSPITAL | Age: 27
End: 2017-02-10

## 2017-02-13 ENCOUNTER — OFFICE VISIT (OUTPATIENT)
Dept: OBSTETRICS AND GYNECOLOGY | Facility: CLINIC | Age: 27
End: 2017-02-13

## 2017-02-13 VITALS
HEIGHT: 63 IN | SYSTOLIC BLOOD PRESSURE: 112 MMHG | BODY MASS INDEX: 34.55 KG/M2 | DIASTOLIC BLOOD PRESSURE: 70 MMHG | WEIGHT: 195 LBS

## 2017-02-13 DIAGNOSIS — Z09 SURGERY FOLLOW-UP: Primary | ICD-10-CM

## 2017-02-13 PROCEDURE — 99024 POSTOP FOLLOW-UP VISIT: CPT | Performed by: OBSTETRICS & GYNECOLOGY

## 2017-02-13 RX ORDER — IBUPROFEN 800 MG/1
800 TABLET ORAL AS NEEDED
Refills: 2 | COMMUNITY
Start: 2017-02-07 | End: 2019-05-24

## 2017-02-13 RX ORDER — LANSOPRAZOLE 30 MG/1
30 CAPSULE, DELAYED RELEASE ORAL DAILY
COMMUNITY
Start: 2017-02-02 | End: 2019-05-24

## 2017-02-13 RX ORDER — CETIRIZINE HYDROCHLORIDE 10 MG/1
10 CAPSULE, LIQUID FILLED ORAL DAILY
COMMUNITY
Start: 2017-01-30 | End: 2017-02-13

## 2017-02-13 NOTE — PROGRESS NOTES
"Subjective   Chief Complaint   Patient presents with   • Post-op     s/p c/s 17     Gunjan Engel is a 26 y.o. year old  presenting to be seen for her post-operative visit.  She had a .  Currently she reports no problems with eating, bowel movements, voiding, or wound drainage and pain is well controlled.she wants to exercise -her pre pregnancy weight was 140 lbs. Question about  early - she was in early labor.    No Additional Complaints Reported    The following portions of the patient's history were reviewed and updated as appropriate:current medications, allergies and past surgical history    Review of Systems normal bladder except SHADE - cough post op - no post nasal drainage almost aspiration \"wet\" - also post op laparoscopy. usuaally after eating fruit etc     Objective   Visit Vitals   • /70   • Ht 63\" (160 cm)   • Wt 195 lb (88.5 kg)   • LMP 2016 (Exact Date)   • Breastfeeding Yes   • BMI 34.54 kg/m2       General:  well developed; well nourished  no acute distress   Abdomen: soft, non-tender; no masses  no umbilical or inginual hernias are present  no hepato-splenomegaly  incision is healing   Pelvis: Not performed.          Assessment   1. Doing well ~ 2 weeks  post op section   2. \"choking \" or cough persists - see HEENT      Plan   1. Kegel's daily and prn  2. Start exercise - slowly   3. Reassured that another  would be safe    Medications Rx this encounter:  New Medications Ordered This Visit   Medications   • sertraline (ZOLOFT) 50 MG tablet     Sig: Take 50 mg by mouth Daily.   • lansoprazole (PREVACID) 30 MG capsule     Sig: Take 30 mg by mouth Daily.   • ibuprofen (ADVIL,MOTRIN) 800 MG tablet     Sig: Take 800 mg by mouth As Needed.     Refill:  2          This note was electronically signed.    Cheikh Bonilla MD  2017    "

## 2017-12-17 DIAGNOSIS — Z98.891 S/P CESAREAN SECTION: ICD-10-CM

## 2017-12-18 RX ORDER — DOCUSATE SODIUM 100 MG
CAPSULE ORAL
Qty: 60 CAPSULE | Refills: 0 | OUTPATIENT
Start: 2017-12-18

## 2018-09-17 ENCOUNTER — HOSPITAL ENCOUNTER (OUTPATIENT)
Dept: NUTRITION | Facility: HOSPITAL | Age: 28
Setting detail: RECURRING SERIES
Discharge: HOME | End: 2018-09-17

## 2018-10-04 ENCOUNTER — APPOINTMENT (OUTPATIENT)
Dept: CT IMAGING | Facility: HOSPITAL | Age: 28
End: 2018-10-04

## 2018-10-04 ENCOUNTER — HOSPITAL ENCOUNTER (EMERGENCY)
Facility: HOSPITAL | Age: 28
Discharge: HOME | End: 2018-10-05
Attending: EMERGENCY MEDICINE | Admitting: EMERGENCY MEDICINE

## 2018-10-04 DIAGNOSIS — T81.49XA SURGICAL SITE INFECTION: Primary | ICD-10-CM

## 2018-10-04 DIAGNOSIS — L03.311 CELLULITIS OF ABDOMINAL WALL: ICD-10-CM

## 2018-10-04 LAB
ALBUMIN SERPL-MCNC: 4.7 G/DL (ref 3.5–5)
ALBUMIN/GLOB SERPL: 1.3 G/DL (ref 1–2)
ALP SERPL-CCNC: 74 U/L (ref 38–126)
ALT SERPL W P-5'-P-CCNC: 28 U/L (ref 13–69)
ANION GAP SERPL CALCULATED.3IONS-SCNC: 12.5 MMOL/L (ref 10–20)
AST SERPL-CCNC: 36 U/L (ref 15–46)
B-HCG UR QL: NEGATIVE
BACTERIA UR QL AUTO: ABNORMAL /HPF
BASOPHILS # BLD AUTO: 0.03 10*3/MM3 (ref 0–0.2)
BASOPHILS NFR BLD AUTO: 0.3 % (ref 0–2.5)
BILIRUB SERPL-MCNC: 0.3 MG/DL (ref 0.2–1.3)
BILIRUB UR QL STRIP: NEGATIVE
BUN BLD-MCNC: 7 MG/DL (ref 7–20)
BUN/CREAT SERPL: 10 (ref 7.1–23.5)
CALCIUM SPEC-SCNC: 9.9 MG/DL (ref 8.4–10.2)
CHLORIDE SERPL-SCNC: 102 MMOL/L (ref 98–107)
CLARITY UR: CLEAR
CO2 SERPL-SCNC: 29 MMOL/L (ref 26–30)
COLOR UR: YELLOW
CREAT BLD-MCNC: 0.7 MG/DL (ref 0.6–1.3)
DEPRECATED RDW RBC AUTO: 40.4 FL (ref 37–54)
EOSINOPHIL # BLD AUTO: 0.22 10*3/MM3 (ref 0–0.7)
EOSINOPHIL NFR BLD AUTO: 2.3 % (ref 0–7)
ERYTHROCYTE [DISTWIDTH] IN BLOOD BY AUTOMATED COUNT: 12.4 % (ref 11.5–14.5)
GFR SERPL CREATININE-BSD FRML MDRD: 99 ML/MIN/1.73
GLOBULIN UR ELPH-MCNC: 3.7 GM/DL
GLUCOSE BLD-MCNC: 75 MG/DL (ref 74–98)
GLUCOSE UR STRIP-MCNC: NEGATIVE MG/DL
HCT VFR BLD AUTO: 43.4 % (ref 37–47)
HGB BLD-MCNC: 13.8 G/DL (ref 12–16)
HGB UR QL STRIP.AUTO: ABNORMAL
HYALINE CASTS UR QL AUTO: ABNORMAL /LPF
IMM GRANULOCYTES # BLD: 0.02 10*3/MM3 (ref 0–0.06)
IMM GRANULOCYTES NFR BLD: 0.2 % (ref 0–0.6)
KETONES UR QL STRIP: NEGATIVE
LEUKOCYTE ESTERASE UR QL STRIP.AUTO: NEGATIVE
LIPASE SERPL-CCNC: 55 U/L (ref 23–300)
LYMPHOCYTES # BLD AUTO: 2.89 10*3/MM3 (ref 0.6–3.4)
LYMPHOCYTES NFR BLD AUTO: 30.8 % (ref 10–50)
MCH RBC QN AUTO: 28.3 PG (ref 27–31)
MCHC RBC AUTO-ENTMCNC: 31.8 G/DL (ref 30–37)
MCV RBC AUTO: 88.9 FL (ref 81–99)
MONOCYTES # BLD AUTO: 0.53 10*3/MM3 (ref 0–0.9)
MONOCYTES NFR BLD AUTO: 5.7 % (ref 0–12)
NEUTROPHILS # BLD AUTO: 5.69 10*3/MM3 (ref 2–6.9)
NEUTROPHILS NFR BLD AUTO: 60.7 % (ref 37–80)
NITRITE UR QL STRIP: NEGATIVE
NRBC BLD MANUAL-RTO: 0 /100 WBC (ref 0–0)
PH UR STRIP.AUTO: 8.5 [PH] (ref 5–8)
PLATELET # BLD AUTO: 302 10*3/MM3 (ref 130–400)
PMV BLD AUTO: 8.6 FL (ref 6–12)
POTASSIUM BLD-SCNC: 3.5 MMOL/L (ref 3.5–5.1)
PROT SERPL-MCNC: 8.4 G/DL (ref 6.3–8.2)
PROT UR QL STRIP: NEGATIVE
RBC # BLD AUTO: 4.88 10*6/MM3 (ref 4.2–5.4)
RBC # UR: ABNORMAL /HPF
REF LAB TEST METHOD: ABNORMAL
SODIUM BLD-SCNC: 140 MMOL/L (ref 137–145)
SP GR UR STRIP: 1.02 (ref 1–1.03)
SQUAMOUS #/AREA URNS HPF: ABNORMAL /HPF
UROBILINOGEN UR QL STRIP: ABNORMAL
WBC NRBC COR # BLD: 9.38 10*3/MM3 (ref 4.8–10.8)
WBC UR QL AUTO: ABNORMAL /HPF

## 2018-10-04 PROCEDURE — 81025 URINE PREGNANCY TEST: CPT | Performed by: PHYSICIAN ASSISTANT

## 2018-10-04 PROCEDURE — 96360 HYDRATION IV INFUSION INIT: CPT

## 2018-10-04 PROCEDURE — 99283 EMERGENCY DEPT VISIT LOW MDM: CPT

## 2018-10-04 PROCEDURE — 81001 URINALYSIS AUTO W/SCOPE: CPT | Performed by: PHYSICIAN ASSISTANT

## 2018-10-04 PROCEDURE — 80053 COMPREHEN METABOLIC PANEL: CPT | Performed by: PHYSICIAN ASSISTANT

## 2018-10-04 PROCEDURE — 85025 COMPLETE CBC W/AUTO DIFF WBC: CPT | Performed by: PHYSICIAN ASSISTANT

## 2018-10-04 PROCEDURE — 83690 ASSAY OF LIPASE: CPT | Performed by: PHYSICIAN ASSISTANT

## 2018-10-04 PROCEDURE — 74176 CT ABD & PELVIS W/O CONTRAST: CPT

## 2018-10-04 RX ORDER — SODIUM CHLORIDE 0.9 % (FLUSH) 0.9 %
10 SYRINGE (ML) INJECTION AS NEEDED
Status: DISCONTINUED | OUTPATIENT
Start: 2018-10-04 | End: 2018-10-05 | Stop reason: HOSPADM

## 2018-10-04 RX ADMIN — SODIUM CHLORIDE 1000 ML: 9 INJECTION, SOLUTION INTRAVENOUS at 23:02

## 2018-10-05 VITALS
HEART RATE: 88 BPM | SYSTOLIC BLOOD PRESSURE: 134 MMHG | BODY MASS INDEX: 42.02 KG/M2 | OXYGEN SATURATION: 97 % | TEMPERATURE: 98.8 F | DIASTOLIC BLOOD PRESSURE: 82 MMHG | RESPIRATION RATE: 18 BRPM | WEIGHT: 237.2 LBS

## 2018-10-05 PROCEDURE — 87070 CULTURE OTHR SPECIMN AEROBIC: CPT | Performed by: PHYSICIAN ASSISTANT

## 2018-10-05 PROCEDURE — 87077 CULTURE AEROBIC IDENTIFY: CPT | Performed by: PHYSICIAN ASSISTANT

## 2018-10-05 PROCEDURE — 87147 CULTURE TYPE IMMUNOLOGIC: CPT | Performed by: PHYSICIAN ASSISTANT

## 2018-10-05 PROCEDURE — 87186 SC STD MICRODIL/AGAR DIL: CPT | Performed by: PHYSICIAN ASSISTANT

## 2018-10-05 RX ORDER — CLINDAMYCIN HYDROCHLORIDE 300 MG/1
300 CAPSULE ORAL 3 TIMES DAILY
Qty: 21 CAPSULE | Refills: 0 | Status: SHIPPED | OUTPATIENT
Start: 2018-10-05 | End: 2018-10-12

## 2018-10-05 RX ORDER — CLINDAMYCIN HYDROCHLORIDE 150 MG/1
450 CAPSULE ORAL ONCE
Status: COMPLETED | OUTPATIENT
Start: 2018-10-05 | End: 2018-10-05

## 2018-10-05 RX ADMIN — CLINDAMYCIN HYDROCHLORIDE 450 MG: 150 CAPSULE ORAL at 01:08

## 2018-10-05 NOTE — DISCHARGE INSTRUCTIONS
Take antibiotic as prescribed to treat infection.  Take over-the-counter ibuprofen and/or Tylenol as needed for pain and fever- 400 mg ibuprofen and/or 500 mg Tylenol every 6 hours. Call OBGYN tomorrow to see if they want to re-schedule your appointment.  Return to ED for any change, worsening of symptoms, or any additional concerns including but not limited to severe abdominal pain, intractable vomiting, worsening redness or drainage from the site.

## 2018-10-05 NOTE — ED PROVIDER NOTES
"Subjective   Patient is a 28-year-old female history of endometriosis, PCOS, HTN, and anxiety that presents the ED for evaluation of fever.  Patient states she had a laparoscopic dilation and evacuation performed on 18 by OBGYN Dr. Varela at [facility].  She states that since the procedure she was feeling weak overall.  She states she had a temperature of 102F today and has been noticing green drainage and redness from the umbilical laparoscopic site.  She states that she called the office today and they were not worried about this.  She states her mother is a nurse practitioner was worried the patient was septic and wanted her evaluated.  She states she has had issues with constipation after the surgery, but thinks it's due to the tramadol; denies any bloody bowel movements or diarrhea. She states she has had some abdominal pain near her umbilicus that feels like \"tugging\" that is intermittent. Denies any chest pain, leg swelling, headache, dizziness, syncope, dysuria, hematuria, heavy vaginal bleeding, abnormal vaginal discharge, or any other symptoms.        History provided by:  Patient      Review of Systems   All other systems reviewed and are negative.      Past Medical History:   Diagnosis Date   • Anxiety    • Depression    • Endometriosis determined by laparoscopy     History of 3 laparoscopies Dr. Evgeny Varela   • Hypertension     H/O       Allergies   Allergen Reactions   • Iodine Anaphylaxis   • Latex Anaphylaxis   • Methylene Blue Anaphylaxis   • Mushroom Extract Complex Anaphylaxis   • Percocet [Oxycodone-Acetaminophen] Anaphylaxis   • Sulfa Antibiotics Rash       Past Surgical History:   Procedure Laterality Date   •  SECTION Bilateral 2017    Procedure:  SECTION REPEAT;  Surgeon: Cheikh Bonilla MD;  Location: Replaced by [facility] LABOR DELIVERY;  Service:    • DIAGNOSTIC LAPAROSCOPY      TIMES 4   • MYOMECTOMY  ,        Family History   Problem Relation Age of Onset   • " Colon cancer Father    • Osteoporosis Paternal Grandmother    • Breast cancer Maternal Aunt    • Ovarian cancer Other        Social History     Social History   • Marital status: Single     Social History Main Topics   • Smoking status: Never Smoker   • Alcohol use 0.6 oz/week     1 Glasses of wine per week      Comment: occasional, glass of wine every couple eeks   • Drug use: No   • Sexual activity: Yes     Partners: Male     Birth control/ protection: None     Other Topics Concern   • Not on file           Objective   Physical Exam   Nursing note and vitals reviewed.    GEN: No acute distress, awake, alert, speaking in full sentences.  Head: Normocephalic, atraumatic  Eyes: Pupils equal round reactive to light  ENT: Posterior pharynx normal in appearance, oral mucosa is moist  Chest: Nontender to palpation  Cardiovascular: Regular rate  Lungs: Clear to auscultation bilaterally  Abdomen: Soft, nontender, nondistended, no peritoneal signs  Extremities: No edema, normal appearance  Neuro: GCS 15  Psych: Mood and affect are appropriate    Procedures           ED Course                  MDM  Number of Diagnoses or Management Options  Cellulitis of abdominal wall:   Surgical site infection:   Diagnosis management comments: Differential includes abscess, cellulitis, urinary tract infection, ileus, or other concerns.  Patient is afebrile, all vital signs within normal limits, nontoxic appearing, no acute respiratory distress.  Low concern for DVT, pneumonia, or other postop sources of infection at this time.  CBC, CMP, lipase unremarkable.  Urine revealed no signs of infection.  UPT negative.  Patient was given 1 L fluid bolus.  CT was performed without contrast given her allergies and found to have no acute abnormalities.  Post surgical site at the umbilicus was cultured and sent to lab.  Will treat with clindamycin given the cellulitis and drainage.  Advised patient to call her OB/GYN first thing in the morning and  establish an appointment.  She was given patient precautions to return to the ED.  She verbalized understanding and was agreeable to this plan of care.  She was discharged home in stable condition.       Amount and/or Complexity of Data Reviewed  Clinical lab tests: reviewed and ordered  Tests in the radiology section of CPT®: reviewed and ordered    Risk of Complications, Morbidity, and/or Mortality  Presenting problems: moderate  Diagnostic procedures: moderate  Management options: moderate    Patient Progress  Patient progress: stable        Final diagnoses:   None            Aurea Neumann PA-C  10/05/18 0218

## 2018-10-07 LAB — BACTERIA SPEC AEROBE CULT: ABNORMAL

## 2019-04-13 ENCOUNTER — HOSPITAL ENCOUNTER (EMERGENCY)
Facility: HOSPITAL | Age: 29
Discharge: HOME | End: 2019-04-13
Attending: EMERGENCY MEDICINE

## 2019-04-13 VITALS
WEIGHT: 220.38 LBS | OXYGEN SATURATION: 100 % | SYSTOLIC BLOOD PRESSURE: 135 MMHG | RESPIRATION RATE: 16 BRPM | BODY MASS INDEX: 51 KG/M2 | DIASTOLIC BLOOD PRESSURE: 95 MMHG | HEIGHT: 55 IN | TEMPERATURE: 97.7 F | HEART RATE: 87 BPM

## 2019-04-13 DIAGNOSIS — R51.9 HEADACHE, UNSPECIFIED HEADACHE TYPE: Primary | ICD-10-CM

## 2019-04-13 PROCEDURE — 96372 THER/PROPH/DIAG INJ SC/IM: CPT

## 2019-04-13 PROCEDURE — 99283 EMERGENCY DEPT VISIT LOW MDM: CPT

## 2019-04-13 PROCEDURE — 25010000002 DEXAMETHASONE PER 1 MG: Performed by: EMERGENCY MEDICINE

## 2019-04-13 PROCEDURE — 25010000002 KETOROLAC TROMETHAMINE PER 15 MG: Performed by: EMERGENCY MEDICINE

## 2019-04-13 RX ORDER — KETOROLAC TROMETHAMINE 30 MG/ML
60 INJECTION, SOLUTION INTRAMUSCULAR; INTRAVENOUS ONCE
Status: COMPLETED | OUTPATIENT
Start: 2019-04-13 | End: 2019-04-13

## 2019-04-13 RX ORDER — DEXAMETHASONE SODIUM PHOSPHATE 10 MG/ML
10 INJECTION INTRAMUSCULAR; INTRAVENOUS ONCE
Status: COMPLETED | OUTPATIENT
Start: 2019-04-13 | End: 2019-04-13

## 2019-04-13 RX ADMIN — DEXAMETHASONE SODIUM PHOSPHATE 10 MG: 10 INJECTION INTRAMUSCULAR; INTRAVENOUS at 19:35

## 2019-04-13 RX ADMIN — KETOROLAC TROMETHAMINE 60 MG: 60 INJECTION, SOLUTION INTRAMUSCULAR at 19:36

## 2019-05-24 ENCOUNTER — OFFICE VISIT (OUTPATIENT)
Dept: SURGERY | Facility: CLINIC | Age: 29
End: 2019-05-24

## 2019-05-24 VITALS
HEART RATE: 78 BPM | HEIGHT: 55 IN | SYSTOLIC BLOOD PRESSURE: 136 MMHG | DIASTOLIC BLOOD PRESSURE: 98 MMHG | OXYGEN SATURATION: 100 % | TEMPERATURE: 97.9 F | BODY MASS INDEX: 50.91 KG/M2 | RESPIRATION RATE: 17 BRPM | WEIGHT: 220 LBS

## 2019-05-24 DIAGNOSIS — T81.89XS NONHEALING SURGICAL WOUND, SEQUELA: Primary | ICD-10-CM

## 2019-05-24 PROCEDURE — 99204 OFFICE O/P NEW MOD 45 MIN: CPT | Performed by: SURGERY

## 2019-05-24 NOTE — PROGRESS NOTES
2019    Patient Information  Gunjan Engel  [address] [address]  1990  [phone] (home)     Chief Complaint   Patient presents with   • Breast Pain     Referred for Right Breast Ulcer       HPI  Patient is a 28-year-old white female referred because of a nonhealing wound on her right breast.  In [place] she underwent a biopsy of a lesion on her right breast.  Pathology report shows it to be an atypical nevus.  Wound is not healing.    Review of Systems    The Past medical history, family history, social history, medication list, allergies, and Review of Systems has been reviewed and confirmed.      General: negative  Integumentary: non-healing wound  Eyes: negative  ENT: negative  Respiratory: negative  Gastrointestinal: negative  Cardiovascular: negative  Neurological: negative  Psychiatric: negative  Hematologic/Lymphatic: negative  Genitourinary: negative  Musculoskeletal: negative  Endocrine: negative  Breasts: breast pain      Patient Active Problem List   Diagnosis   • Post partum depression   • S/P myomectomy   • Endometriosis determined by laparoscopy   • Anxiety   • Normal labor         Past Medical History:   Diagnosis Date   • Anxiety    • Depression    • Endometriosis determined by laparoscopy     History of 3 laparoscopies Dr. Evgeny Varela   • Hypertension     H/O   • Migraine    • Migraines    • Squamous cell carcinoma of breast          Past Surgical History:   Procedure Laterality Date   •  SECTION Bilateral 2017    Procedure:  SECTION REPEAT;  Surgeon: Cheikh Bonilla MD;  Location: [facility] LABOR DELIVERY;  Service:    • DIAGNOSTIC LAPAROSCOPY      TIMES 4   • MYOMECTOMY  ,          Family History   Problem Relation Age of Onset   • Colon cancer Father    • Osteoporosis Paternal Grandmother    • Breast cancer Maternal Aunt    • Ovarian cancer Other          Social History     Tobacco Use   • Smoking status: Never Smoker  "  Substance Use Topics   • Alcohol use: Yes     Alcohol/week: 0.6 oz     Types: 1 Glasses of wine per week     Comment: occasional, glass of wine every couple eeks   • Drug use: No       Current Outpatient Medications   Medication Sig Dispense Refill   • metFORMIN (GLUCOPHAGE) 500 MG tablet   3     No current facility-administered medications for this visit.          Allergies  Iodine; Latex; Methylene blue; Mushroom extract complex; Percocet [oxycodone-acetaminophen]; and Sulfa antibiotics    /98   Pulse 78   Temp 97.9 °F (36.6 °C) (Oral)   Resp 17   Ht 137.2 cm (54.02\")   Wt 99.8 kg (220 lb)   SpO2 100%   BMI 53.01 kg/m²      Physical Exam    General :  Patient is a 28 y.o. female in no acute distress.    HEENT:  Normocephalic, pupils equally round and reactive to light, extraocular motions intact.  Chest:  Clear bilaterally. Equal breath sounds. No rales or rhonchi.  Heart:   Regular rate and rhythm.  Abdomen:  Soft, nontender. No distention.  :  Normal external genitalia.  Rectal:  Not performed.  Extremities:  No clubbing cyanosis or edema. Good femoral, popliteal, dorsalis pedis and posterior tibial pulse.  Neurologic:  Patient oriented ×3. Cranial nerves grossly intact. No sensory,cellebellar, or motor dysfunction.      Right breast lower inner quadrant there is a nonhealing wound measuring approximately 0.75 cm in diameter          ASSESSMENT  Nonhealing wound right breast        PLAN    Excise area with primary closure.  This can be done in the office.  She will return in the next week or so.    Patient's Body mass index is 53.01 kg/m². BMI is above normal parameters. Recommendations include: educational material.           This document signed by Logan Pascual MD May 24, 2019 2:37 PM   "

## 2019-05-28 ENCOUNTER — TELEPHONE (OUTPATIENT)
Dept: SURGERY | Facility: CLINIC | Age: 29
End: 2019-05-28

## 2019-05-28 ENCOUNTER — PROCEDURE VISIT (OUTPATIENT)
Dept: SURGERY | Facility: CLINIC | Age: 29
End: 2019-05-28

## 2019-05-28 DIAGNOSIS — T81.89XS NONHEALING SURGICAL WOUND, SEQUELA: Primary | ICD-10-CM

## 2019-05-28 DIAGNOSIS — L98.499 ULCER OF SKIN OF BREAST (HCC): Primary | ICD-10-CM

## 2019-05-28 PROCEDURE — 11406 EXC TR-EXT B9+MARG >4.0 CM: CPT | Performed by: SURGERY

## 2019-05-28 NOTE — PROGRESS NOTES
2019    Patient Information  Gunjan Engel  [address] [address]  1990  [phone] (home)     Chief Complaint   Patient presents with   • Follow-up     Excision Lesion R Breast       HPI  Patient is a 28-year-old white female with a nonhealing lesion on her right breast.  Pathology shows this to be atypical mole.  Was excised by dermatologist 3 weeks ago.  It is not healing    Review of Systems    General: negative  Integumentary: non-healing wound  Eyes: negative  ENT: negative  Respiratory: negative  Gastrointestinal: negative  Cardiovascular: negative  Neurological: negative  Psychiatric: negative  Hematologic/Lymphatic: negative  Genitourinary: negative  Musculoskeletal: negative  Endocrine: negative  Breasts: breast pain      Patient Active Problem List   Diagnosis   • Post partum depression   • S/P myomectomy   • Endometriosis determined by laparoscopy   • Anxiety   • Normal labor         Past Medical History:   Diagnosis Date   • Anxiety    • Depression    • Endometriosis determined by laparoscopy     History of 3 laparoscopies Dr. Evgeny Varela   • Hypertension 2009    H/O   • Migraine    • Migraines    • Squamous cell carcinoma of breast          Past Surgical History:   Procedure Laterality Date   •  SECTION Bilateral 2017    Procedure:  SECTION REPEAT;  Surgeon: Cheikh Bonilla MD;  Location: [facility] LABOR DELIVERY;  Service:    • DIAGNOSTIC LAPAROSCOPY      TIMES 4   • ENDOMETRIAL ABLATION     • MYOMECTOMY  ,          Family History   Problem Relation Age of Onset   • Colon cancer Father    • Osteoporosis Paternal Grandmother    • Breast cancer Maternal Aunt    • Ovarian cancer Other    • No Known Problems Mother          Social History     Tobacco Use   • Smoking status: Never Smoker   • Smokeless tobacco: Never Used   Substance Use Topics   • Alcohol use: Yes     Alcohol/week: 0.6 oz     Types: 1 Glasses of wine per week     Comment:  "occasional, glass of wine every couple eeks   • Drug use: No       Current Outpatient Medications   Medication Sig Dispense Refill   • metFORMIN (GLUCOPHAGE) 500 MG tablet   3     No current facility-administered medications for this visit.          Allergies  Iodine; Latex; Methylene blue; Mushroom extract complex; Percocet [oxycodone-acetaminophen]; and Sulfa antibiotics    /78   Pulse 69   Temp 98.2 °F (36.8 °C)   Resp 17   Ht 137.2 cm (54.02\")   Wt 103 kg (227 lb)   SpO2 99%   BMI 54.70 kg/m²      Physical Exam    General :  Patient is a 28 y.o. female in no acute distress.    Procedure    Right breast prepped and draped Betadine.  1% Xylocaine with epinephrine infiltrated.  Elliptical incision made leaving a wound measuring 4 cm x 2 cm.  Wound closed with interrupted nylons.  Pathology sent.  Return 1 week              ASSESSMENT  Nonhealing wound right breast        PLAN    Excision performed.  Return 1 week    Patient's Body mass index is 54.7 kg/m². BMI is above normal parameters. Recommendations include: educational material.           This document signed by Logan Pascual MD May 28, 2019 11:26 AM   "

## 2019-05-28 NOTE — TELEPHONE ENCOUNTER
Re excision breast lesion  [id number]    No Auth Req Per [employer] Provider Portal Auth Tool

## 2019-05-31 VITALS
HEIGHT: 55 IN | DIASTOLIC BLOOD PRESSURE: 78 MMHG | RESPIRATION RATE: 17 BRPM | BODY MASS INDEX: 52.54 KG/M2 | TEMPERATURE: 98.2 F | HEART RATE: 69 BPM | OXYGEN SATURATION: 99 % | SYSTOLIC BLOOD PRESSURE: 115 MMHG | WEIGHT: 227 LBS

## 2020-04-11 ENCOUNTER — TELEPHONE (OUTPATIENT)
Dept: URGENT CARE | Facility: CLINIC | Age: 30
End: 2020-04-11

## 2020-04-11 DIAGNOSIS — J20.9 ACUTE BRONCHITIS, UNSPECIFIED ORGANISM: Primary | ICD-10-CM

## 2020-04-11 RX ORDER — ALBUTEROL SULFATE 90 UG/1
2 AEROSOL, METERED RESPIRATORY (INHALATION) EVERY 4 HOURS PRN
Qty: 1 INHALER | Refills: 0 | Status: SHIPPED | OUTPATIENT
Start: 2020-04-11 | End: 2021-10-04

## 2021-02-10 ENCOUNTER — TELEPHONE (OUTPATIENT)
Dept: OBSTETRICS AND GYNECOLOGY | Facility: CLINIC | Age: 31
End: 2021-02-10

## 2021-02-10 ENCOUNTER — LAB (OUTPATIENT)
Dept: LAB | Facility: HOSPITAL | Age: 31
End: 2021-02-10

## 2021-02-10 DIAGNOSIS — O09.291 HISTORY OF MISCARRIAGE, CURRENTLY PREGNANT, FIRST TRIMESTER: ICD-10-CM

## 2021-02-10 DIAGNOSIS — O09.291 HISTORY OF MISCARRIAGE, CURRENTLY PREGNANT, FIRST TRIMESTER: Primary | ICD-10-CM

## 2021-02-10 LAB
HCG INTACT+B SERPL-ACNC: 48.64 MIU/ML
PROGEST SERPL-MCNC: 18.7 NG/ML

## 2021-02-10 PROCEDURE — 84702 CHORIONIC GONADOTROPIN TEST: CPT

## 2021-02-10 PROCEDURE — 36415 COLL VENOUS BLD VENIPUNCTURE: CPT | Performed by: OBSTETRICS & GYNECOLOGY

## 2021-02-10 PROCEDURE — 84144 ASSAY OF PROGESTERONE: CPT | Performed by: OBSTETRICS & GYNECOLOGY

## 2021-02-10 NOTE — TELEPHONE ENCOUNTER
Left message to call office    She returned call but when I called back I got voice mail.  [place] [facility] [place]

## 2021-02-11 ENCOUNTER — TELEPHONE (OUTPATIENT)
Dept: OBSTETRICS AND GYNECOLOGY | Facility: CLINIC | Age: 31
End: 2021-02-11

## 2021-02-11 DIAGNOSIS — O20.0 THREATENED ABORTION: Primary | ICD-10-CM

## 2021-02-11 NOTE — TELEPHONE ENCOUNTER
hcg reviewed, 48.64 prog 18.70 planning to repeat Friday. Wants Covid AB testing with [facility] . She is taking PNV DHA folic 1mg super B-complex Metformin. Stopped Trulicity due to pregnancy warnings. PCOS . Stopped Wellbutrin for anxiety and is ok for now. Hx of myomectomy.

## 2021-02-12 ENCOUNTER — LAB (OUTPATIENT)
Dept: LAB | Facility: HOSPITAL | Age: 31
End: 2021-02-12

## 2021-02-12 DIAGNOSIS — O20.0 THREATENED ABORTION: ICD-10-CM

## 2021-02-12 LAB — HCG INTACT+B SERPL-ACNC: 129.3 MIU/ML

## 2021-02-12 PROCEDURE — 84702 CHORIONIC GONADOTROPIN TEST: CPT

## 2021-02-12 PROCEDURE — 36415 COLL VENOUS BLD VENIPUNCTURE: CPT

## 2021-02-15 ENCOUNTER — LAB (OUTPATIENT)
Dept: LAB | Facility: HOSPITAL | Age: 31
End: 2021-02-15

## 2021-02-15 ENCOUNTER — TELEPHONE (OUTPATIENT)
Dept: OBSTETRICS AND GYNECOLOGY | Facility: CLINIC | Age: 31
End: 2021-02-15

## 2021-02-15 DIAGNOSIS — O09.299 HISTORY OF MISCARRIAGE, CURRENTLY PREGNANT: ICD-10-CM

## 2021-02-15 DIAGNOSIS — O09.291 HISTORY OF MISCARRIAGE, CURRENTLY PREGNANT, FIRST TRIMESTER: Primary | ICD-10-CM

## 2021-02-15 LAB — HCG INTACT+B SERPL-ACNC: 566 MIU/ML

## 2021-02-15 PROCEDURE — 36415 COLL VENOUS BLD VENIPUNCTURE: CPT

## 2021-02-15 PROCEDURE — 84702 CHORIONIC GONADOTROPIN TEST: CPT

## 2021-02-15 NOTE — TELEPHONE ENCOUNTER
History of miscarriage.    [facility] 2/10  48  And 2/12 128    Will come for another repeat.  It will be done in [place]

## 2021-02-23 ENCOUNTER — OFFICE VISIT (OUTPATIENT)
Dept: OBSTETRICS AND GYNECOLOGY | Facility: CLINIC | Age: 31
End: 2021-02-23

## 2021-02-23 VITALS
HEIGHT: 64 IN | SYSTOLIC BLOOD PRESSURE: 120 MMHG | BODY MASS INDEX: 39.61 KG/M2 | DIASTOLIC BLOOD PRESSURE: 80 MMHG | WEIGHT: 232 LBS

## 2021-02-23 DIAGNOSIS — O20.0 THREATENED ABORTION: Primary | ICD-10-CM

## 2021-02-23 PROCEDURE — 0501F PRENATAL FLOW SHEET: CPT | Performed by: OBSTETRICS & GYNECOLOGY

## 2021-02-23 RX ORDER — FOLIC ACID 1 MG/1
TABLET ORAL DAILY
COMMUNITY
Start: 2020-11-16 | End: 2021-10-04 | Stop reason: HOSPADM

## 2021-02-23 RX ORDER — DULAGLUTIDE 1.5 MG/.5ML
INJECTION, SOLUTION SUBCUTANEOUS
COMMUNITY
Start: 2021-01-17 | End: 2021-03-09

## 2021-02-23 RX ORDER — BUPROPION HYDROCHLORIDE 150 MG/1
150 TABLET, EXTENDED RELEASE ORAL 2 TIMES DAILY
COMMUNITY
Start: 2021-01-17 | End: 2021-03-09

## 2021-03-09 ENCOUNTER — INITIAL PRENATAL (OUTPATIENT)
Dept: OBSTETRICS AND GYNECOLOGY | Facility: CLINIC | Age: 31
End: 2021-03-09

## 2021-03-09 ENCOUNTER — OFFICE VISIT (OUTPATIENT)
Dept: OBSTETRICS AND GYNECOLOGY | Facility: CLINIC | Age: 31
End: 2021-03-09

## 2021-03-09 VITALS — BODY MASS INDEX: 40.34 KG/M2 | WEIGHT: 235 LBS | SYSTOLIC BLOOD PRESSURE: 116 MMHG | DIASTOLIC BLOOD PRESSURE: 72 MMHG

## 2021-03-09 DIAGNOSIS — O34.219 PREVIOUS CESAREAN DELIVERY AFFECTING PREGNANCY: ICD-10-CM

## 2021-03-09 DIAGNOSIS — Z53.21 PATIENT LEFT WITHOUT BEING SEEN: Primary | ICD-10-CM

## 2021-03-09 DIAGNOSIS — O26.841 UTERINE SIZE DATE DISCREPANCY PREGNANCY, FIRST TRIMESTER: ICD-10-CM

## 2021-03-09 DIAGNOSIS — Z34.90 PREGNANCY, UNSPECIFIED GESTATIONAL AGE: Primary | ICD-10-CM

## 2021-03-09 PROCEDURE — 0501F PRENATAL FLOW SHEET: CPT | Performed by: OBSTETRICS & GYNECOLOGY

## 2021-03-09 NOTE — PROGRESS NOTES
"Initial ob visit     CC- Here for care of pregnancy        Gunjan Engel is a 30 y.o. female, , who presents for her first obstetrical visit.  Her last LMP was Patient's last menstrual period was 2020..    # 1A - Date: 17, Sex: Male, Weight: 2275 g (5 lb 0.3 oz), GA: 37w3d, Delivery: , Low Transverse, Apgar1: 6, Apgar5: 8, Living: Living, Birth Comments: None  # 1B - Date: None, Sex: None, Weight: None, GA: None, Delivery: None, Apgar1: None, Apgar5: None, Living: None, Birth Comments: None    # 2 - Date: 20, Sex: None, Weight: None, GA: None, Delivery: None, Apgar1: None, Apgar5: None, Living: None, Birth Comments: None    # 3 - Date: None, Sex: None, Weight: None, GA: None, Delivery: None, Apgar1: None, Apgar5: None, Living: None, Birth Comments: None      Current obstetric complaints : Nausea and Fatigue  Initial positive test date : 4 weeks ago     Location : home UPT    Prior obstetric issues, Previous pregnancy complicated by hypertensive disorder and Previous  section  Potential pregnancy concerns: HTN  Family history of genetic issues (includes FOB):son with 2P16.3 microdeletions  Prior infections concerning in pregnancy (Rash, fever in last 2 weeks): no  Varicella Hx -Yes  Prior testing for Cystic Fibrosis Carrier or Sickle Cell Trait- No  Prepregnancy BMI - Body mass index is 40.34 kg/m².  Hx of HSV for patient or partner : no    Additional Pertinent History   Last Pap :   Last Completed Pap Smear       Status Date      PAP SMEAR Done 2020 neg     Patient has more history with this topic...        History of abnormal Pap smear: yes - \"yrs ago\"  Family history of uterine, colon, breast, or ovarian cancer: yes - Ovarian CA-mother, colon CA-Father  Performs monthly Self-Breast Exam: no  Exercises Regularly: no  Feelings of Anxiety or Depression: no  Tobacco Usage?: No     The additional following portions of the patient's history were reviewed and updated as " appropriate: allergies, current medications, past family history, past medical history, past social history, past surgical history and problem list.    Review of Systems   Review of Systems  Constitutional : Nausea, fatigue -yes   : Vaginal bleeding, cramping -no  Breast Tenderness : yes  All systems reviewed and updated    /72   Wt 107 kg (235 lb)   LMP 2020   BMI 40.34 kg/m²     Physical Exam  General Appearance:    Alert, cooperative, in no acute distress   Head:    Normocephalic, without obvious abnormality, atraumatic   Eyes:            Lids and lashes normal, conjunctivae and sclerae normal, no icterus, no pallor, corneas clear   Ears:    Ears appear intact with no abnormalities noted       Neck:   No adenopathy, supple, trachea midline, no thyromegaly   Back:     No kyphosis present, no scoliosis present,                       Extremities:   Moves all extremities well, no edema, no cyanosis   Skin:   No bleeding, bruising or rash   Lymph nodes:   No palpable adenopathy   Neurologic:   Sensation intact, A&O times 3        Assessment/Plan   Assessment     Problem List Items Addressed This Visit     None      Visit Diagnoses     Pregnancy, unspecified gestational age    -  Primary    Relevant Orders    Obstetric Panel    HIV-1 / O / 2 Ag / Antibody 4th Generation    Urine Culture - Urine, Urine, Clean Catch    Urine Drug Screen - Urine, Clean Catch    Pap IG, Ct-Ng, HPV-hr    Previous  delivery affecting pregnancy        Relevant Orders    Obstetric Panel    HIV-1 / O / 2 Ag / Antibody 4th Generation    Urine Culture - Urine, Urine, Clean Catch    Urine Drug Screen - Urine, Clean Catch    Pap IG, Ct-Ng, HPV-hr          1. Pregnancy at 7w5d  2. Nausea  3. Previous baby with microdeletion    Plan     1. Reviewed routine prenatal care with the office and educational materials given  2. Counseled on genetic testing options including CF DNA, carrier testing including CF, SMA, and Fragile X,   Tetrascreen,  NT screening and NIPTS/Materniti 21.  3. Follow up: 4 week(s)  4. She understands the NIPTS wont  her sons microdeletion.      Luzmaria Forbes MD  03/09/2021

## 2021-03-11 PROBLEM — Z37.9 NORMAL LABOR: Status: RESOLVED | Noted: 2017-01-31 | Resolved: 2021-03-11

## 2021-03-11 LAB
ABO GROUP BLD: NORMAL
AMPHETAMINES UR QL SCN: NEGATIVE NG/ML
BACTERIA UR CULT: NO GROWTH
BACTERIA UR CULT: NORMAL
BARBITURATES UR QL SCN: NEGATIVE NG/ML
BASOPHILS # BLD AUTO: 0 X10E3/UL (ref 0–0.2)
BASOPHILS NFR BLD AUTO: 0 %
BENZODIAZ UR QL SCN: NEGATIVE NG/ML
BLD GP AB SCN SERPL QL: NEGATIVE
BZE UR QL SCN: NEGATIVE NG/ML
CANNABINOIDS UR QL SCN: NEGATIVE NG/ML
CREAT UR-MCNC: 132.9 MG/DL (ref 20–300)
EOSINOPHIL # BLD AUTO: 0.1 X10E3/UL (ref 0–0.4)
EOSINOPHIL NFR BLD AUTO: 1 %
ERYTHROCYTE [DISTWIDTH] IN BLOOD BY AUTOMATED COUNT: 12.9 % (ref 11.7–15.4)
HBV SURFACE AG SERPL QL IA: NEGATIVE
HCT VFR BLD AUTO: 36.8 % (ref 34–46.6)
HCV AB S/CO SERPL IA: <0.1 S/CO RATIO (ref 0–0.9)
HGB BLD-MCNC: 12.1 G/DL (ref 11.1–15.9)
HIV 1+2 AB+HIV1 P24 AG SERPL QL IA: NON REACTIVE
IMM GRANULOCYTES # BLD AUTO: 0 X10E3/UL (ref 0–0.1)
IMM GRANULOCYTES NFR BLD AUTO: 0 %
LABORATORY COMMENT REPORT: NORMAL
LYMPHOCYTES # BLD AUTO: 2.1 X10E3/UL (ref 0.7–3.1)
LYMPHOCYTES NFR BLD AUTO: 29 %
MCH RBC QN AUTO: 29 PG (ref 26.6–33)
MCHC RBC AUTO-ENTMCNC: 32.9 G/DL (ref 31.5–35.7)
MCV RBC AUTO: 88 FL (ref 79–97)
METHADONE UR QL SCN: NEGATIVE NG/ML
MONOCYTES # BLD AUTO: 0.5 X10E3/UL (ref 0.1–0.9)
MONOCYTES NFR BLD AUTO: 7 %
NEUTROPHILS # BLD AUTO: 4.7 X10E3/UL (ref 1.4–7)
NEUTROPHILS NFR BLD AUTO: 63 %
OPIATES UR QL SCN: NEGATIVE NG/ML
OXYCODONE+OXYMORPHONE UR QL SCN: NEGATIVE NG/ML
PCP UR QL: NEGATIVE NG/ML
PH UR: 7.2 [PH] (ref 4.5–8.9)
PLATELET # BLD AUTO: 266 X10E3/UL (ref 150–450)
PROPOXYPH UR QL SCN: NEGATIVE NG/ML
RBC # BLD AUTO: 4.17 X10E6/UL (ref 3.77–5.28)
RH BLD: POSITIVE
RPR SER QL: NON REACTIVE
RUBV IGG SERPL IA-ACNC: 4.28 INDEX
WBC # BLD AUTO: 7.4 X10E3/UL (ref 3.4–10.8)

## 2021-03-12 DIAGNOSIS — O34.219 PREVIOUS CESAREAN DELIVERY AFFECTING PREGNANCY: ICD-10-CM

## 2021-03-12 DIAGNOSIS — Z34.90 PREGNANCY, UNSPECIFIED GESTATIONAL AGE: ICD-10-CM

## 2021-03-19 ENCOUNTER — TELEPHONE (OUTPATIENT)
Dept: OBSTETRICS AND GYNECOLOGY | Facility: CLINIC | Age: 31
End: 2021-03-19

## 2021-03-19 DIAGNOSIS — Z3A.09 9 WEEKS GESTATION OF PREGNANCY: Primary | ICD-10-CM

## 2021-03-19 NOTE — TELEPHONE ENCOUNTER
Pt. States she was going to have her genetic testing done at [facility] but no order was in. Informed that they may not have the correct kit needed to do this. She VU. Will see if they can do it there if not will have it done here. Order placed.

## 2021-03-20 ENCOUNTER — LAB (OUTPATIENT)
Dept: LAB | Facility: HOSPITAL | Age: 31
End: 2021-03-20

## 2021-03-20 PROCEDURE — 36415 COLL VENOUS BLD VENIPUNCTURE: CPT | Performed by: OBSTETRICS & GYNECOLOGY

## 2021-03-22 ENCOUNTER — TELEPHONE (OUTPATIENT)
Dept: OBSTETRICS AND GYNECOLOGY | Facility: CLINIC | Age: 31
End: 2021-03-22

## 2021-03-22 NOTE — TELEPHONE ENCOUNTER
Patient is calling for lab results.    Had Materni 21 lab drawn Saturday in [facility] on 3/20/21 and was told it would be back today.     Per Milli Jimenez from [facility], takes approx 7 to 10 days for these labs results.

## 2021-03-25 ENCOUNTER — TELEPHONE (OUTPATIENT)
Dept: OBSTETRICS AND GYNECOLOGY | Facility: CLINIC | Age: 31
End: 2021-03-25

## 2021-04-05 ENCOUNTER — ROUTINE PRENATAL (OUTPATIENT)
Dept: OBSTETRICS AND GYNECOLOGY | Facility: CLINIC | Age: 31
End: 2021-04-05

## 2021-04-05 VITALS — SYSTOLIC BLOOD PRESSURE: 124 MMHG | WEIGHT: 244 LBS | BODY MASS INDEX: 41.88 KG/M2 | DIASTOLIC BLOOD PRESSURE: 72 MMHG

## 2021-04-05 DIAGNOSIS — Z3A.11 11 WEEKS GESTATION OF PREGNANCY: ICD-10-CM

## 2021-04-05 DIAGNOSIS — O20.9 VAGINAL BLEEDING AFFECTING EARLY PREGNANCY: Primary | ICD-10-CM

## 2021-04-05 LAB
EXPIRATION DATE: 0
GLUCOSE UR STRIP-MCNC: NEGATIVE MG/DL
Lab: 0
PROT UR STRIP-MCNC: NEGATIVE MG/DL

## 2021-04-05 PROCEDURE — 0502F SUBSEQUENT PRENATAL CARE: CPT | Performed by: NURSE PRACTITIONER

## 2021-04-05 NOTE — PROGRESS NOTES
OB FOLLOW UP  CC- Here for care of pregnancy         Gunjan Engel is a 30 y.o.  11w4d patient being seen today for her obstetrical follow up visit. Patient reports headache, nausea, vomiting and spotty vision, spotting. Patient states she had bent to  her 2yo son 3 days ago and when she did she felt a sharp pain on her right side and then started bleeding. She reports the bleeding is brown and mucous like sometimes spotting but there has been times it was heavy. Patient reports no other times of pain. She also has complaints of spotty vision and 3 weeks ago while she was driving her vision went out in both eyes and she had to pull over and wait for it to come back .    Her prenatal care is complicated by (and status) :  HTN and PCOS  Patient Active Problem List   Diagnosis   • Post partum depression   • S/P myomectomy   • Endometriosis determined by laparoscopy   • Anxiety         Ultrasound Today: Yes.  Findings showed cardiac activity present,  bpm, placenta anterior/fundal.  I have personally evaluated the U/S and agree with the findings. DIMITRI Harrington    ROS -   Patient Reports : Vaginal Spotting, Vision Changes, Headaches and Nausea  Patient Denies: Loss of Fluid, Contractions and Epigastric pain  Fetal Movement : absent- too early  All other systems reviewed and are negative.       The additional following portions of the patient's history were reviewed and updated as appropriate: allergies, current medications, past family history, past medical history, past social history and past surgical history.    I have reviewed and agree with the HPI, ROS, and historical information as entered above. DIMITRI Harrington    /72   Wt 111 kg (244 lb)   LMP 2020   BMI 41.88 kg/m²       EXAM:     FHT: 168 BPM   Uterine Size: Not performed  Pelvic Exam: Not performed    Urine glucose/protein: See prenatal flowsheet       Assessment and Plan    Problem List Items Addressed  This Visit     None      Visit Diagnoses     Vaginal bleeding affecting early pregnancy    -  Primary    Relevant Orders    US Ob < 14 Weeks Single or First Gestation    11 weeks gestation of pregnancy        Relevant Orders    US Ob < 14 Weeks Single or First Gestation    POC Glucose, Urine, Qualitative, Dipstick (Completed)    POC Protein, Urine, Qualitative, Dipstick (Completed)          1. Pregnancy at 11w4d  2. Fetal status reassuring.   3. Ultrasound today for brown spotting this past weekend. U/S findings reviewed with pt. No evidence of CYNDY.  4. MBT= O positive  5. Pt. Advised no intercourse and avoid heavy lifting for next 2 weeks.   6. Will cancel appt. For this coming Thursday and RTC in 2 weeks.   7. Will call for any increased bleeding or pain.       Nicolasa Gaspar, APRN  04/05/2021

## 2021-04-09 NOTE — PROGRESS NOTES
The patient left the office before care was provided and did not complete the visit because of the wait time.

## 2021-04-19 DIAGNOSIS — Z3A.13 13 WEEKS GESTATION OF PREGNANCY: Primary | ICD-10-CM

## 2021-04-19 DIAGNOSIS — O41.8X10 SUBCHORIONIC HEMORRHAGE OF PLACENTA IN FIRST TRIMESTER, SINGLE OR UNSPECIFIED FETUS: ICD-10-CM

## 2021-04-19 DIAGNOSIS — O46.8X1 SUBCHORIONIC HEMORRHAGE OF PLACENTA IN FIRST TRIMESTER, SINGLE OR UNSPECIFIED FETUS: ICD-10-CM

## 2021-04-20 ENCOUNTER — ROUTINE PRENATAL (OUTPATIENT)
Dept: OBSTETRICS AND GYNECOLOGY | Facility: CLINIC | Age: 31
End: 2021-04-20

## 2021-04-20 VITALS — WEIGHT: 239.2 LBS | SYSTOLIC BLOOD PRESSURE: 116 MMHG | BODY MASS INDEX: 41.06 KG/M2 | DIASTOLIC BLOOD PRESSURE: 60 MMHG

## 2021-04-20 DIAGNOSIS — Z34.90 PREGNANCY, UNSPECIFIED GESTATIONAL AGE: Primary | ICD-10-CM

## 2021-04-20 LAB
EXPIRATION DATE: 0
GLUCOSE UR STRIP-MCNC: NEGATIVE MG/DL
Lab: 0
PROT UR STRIP-MCNC: NEGATIVE MG/DL

## 2021-04-20 PROCEDURE — 0502F SUBSEQUENT PRENATAL CARE: CPT | Performed by: OBSTETRICS & GYNECOLOGY

## 2021-04-20 NOTE — PROGRESS NOTES
OB FOLLOW UP    Gunjan Engel is a 30 y.o.  13w5d patient being seen today for her obstetrical follow up visit. Patient reports she is still spotting. Mild cramping (sharp, intermittent). Uses a pantiliner now and changes it 2-3x daily; mucousy clots noted. Remains on pelvic rest and has restricted lifting. U/S today for f/u CYNDY.    Her prenatal care is complicated by (and status): hx C/S, myomectomy, PIH, PPD    The additional following portions of the patient's history were reviewed and updated as appropriate: allergies, current medications, past family history, past medical history, past social history, past surgical history and problem list.      ROS -   Symptoms: (see above)   Vaginal bleeding: spotting  Cramping/Contractions: mild     /60   Wt 109 kg (239 lb 3.2 oz)   LMP 2020   BMI 41.06 kg/m²     FHT:  present   Pelvic Exam: Performed: No     Assessment    1. Pregnancy at 13w5d  2. Spotting in pregnancy  3. Fetal status reassuring   4. Counseled on genetic testing, carrier status and option for NT screen      Problem List Items Addressed This Visit     None      Visit Diagnoses     Pregnancy, unspecified gestational age    -  Primary    Relevant Orders    POC Glucose, Urine, Qualitative, Dipstick    POC Protein, Urine, Qualitative, Dipstick          Plan    1. Reviewed routine prenatal care with the office and educational materials given   2. Reviewed u/s - no clear previa sent but area in GHULAM ? CYNDY.  Continue pelvic rest and restricted lifting.   3.  getting vasetomy.  Urged to consider ligation at the time of c/s as well.  4. F/U in 3 weeks.  PDC in 6  5. Follow up: 4 weeks or prn problems    Roseline Dao RN  2021

## 2021-05-25 ENCOUNTER — ROUTINE PRENATAL (OUTPATIENT)
Dept: OBSTETRICS AND GYNECOLOGY | Facility: CLINIC | Age: 31
End: 2021-05-25

## 2021-05-25 VITALS — BODY MASS INDEX: 42.23 KG/M2 | WEIGHT: 246 LBS | DIASTOLIC BLOOD PRESSURE: 82 MMHG | SYSTOLIC BLOOD PRESSURE: 118 MMHG

## 2021-05-25 DIAGNOSIS — O46.90 VAGINAL BLEEDING DURING PREGNANCY: ICD-10-CM

## 2021-05-25 DIAGNOSIS — Z3A.18 18 WEEKS GESTATION OF PREGNANCY: Primary | ICD-10-CM

## 2021-05-25 DIAGNOSIS — Z98.891 PREVIOUS CESAREAN SECTION: ICD-10-CM

## 2021-05-25 LAB
GLUCOSE UR STRIP-MCNC: NEGATIVE MG/DL
PROT UR STRIP-MCNC: NEGATIVE MG/DL

## 2021-05-25 PROCEDURE — 0502F SUBSEQUENT PRENATAL CARE: CPT | Performed by: OBSTETRICS & GYNECOLOGY

## 2021-05-25 NOTE — PROGRESS NOTES
OB FOLLOW UP    Gunjan Engel is a 30 y.o.  18w5d patient being seen today for her obstetrical follow up visit. Patient reports continued spotting and headaches.     Her prenatal care is complicated by (and status) : Previous  section, myomectomy, h/o PIH, h/o severe PP depression    The additional following portions of the patient's history were reviewed and updated as appropriate: allergies, current medications, past family history, past medical history, past social history, past surgical history and problem list.      ROS -   Vision Changes and Headaches   Vaginal bleeding intermittent spotting    /82   Wt 112 kg (246 lb)   LMP 2020   BMI 42.23 kg/m²     FHT:  present   Pelvic Exam: Performed: No     Assessment    1. Pregnancy at 18w5d  2. Fetal status reassuring   3. Counseled on MSAFP alone in relation to OTD and placental issues.      Problem List Items Addressed This Visit     None      Visit Diagnoses     18 weeks gestation of pregnancy    -  Primary    Relevant Orders    POC Urinalysis Dipstick (Completed)    Alpha Fetoprotein, Maternal    Previous  section        Relevant Orders    Alpha Fetoprotein, Maternal          Plan    1. U/S today with normal cervical length, anterior placenta and no evidence of CYNDY  2. Anatomy scan next visit. Needs referral to PDC  3. Reviewed routine prenatal care with the office and educational materials given   4. Recommend no home visits for work at this time due to pregnancy risk factors and covid.  5. Follow up: 4 weeks  6. Call for any problems, bleeding    Luzmaria Forbes MD  2021

## 2021-05-26 LAB — SARS-COV-2 AB SERPL QL IA: NEGATIVE

## 2021-05-27 LAB
AFP ADJ MOM SERPL: 1.39
AFP INTERP SERPL-IMP: NORMAL
AFP INTERP SERPL-IMP: NORMAL
AFP SERPL-MCNC: 50.7 NG/ML
AGE AT DELIVERY: 32.2 YR
GA METHOD: NORMAL
GA: 18.7 WEEKS
IDDM PATIENT QL: NO
LABORATORY COMMENT REPORT: NORMAL
MULTIPLE PREGNANCY: NO
NEURAL TUBE DEFECT RISK FETUS: 3704 %
RESULT: NORMAL

## 2021-06-09 ENCOUNTER — HOSPITAL ENCOUNTER (OUTPATIENT)
Dept: WOMENS IMAGING | Facility: HOSPITAL | Age: 31
Discharge: HOME | End: 2021-06-09

## 2021-06-09 ENCOUNTER — LAB (OUTPATIENT)
Dept: LAB | Facility: HOSPITAL | Age: 31
End: 2021-06-09

## 2021-06-09 ENCOUNTER — OFFICE VISIT (OUTPATIENT)
Dept: OBSTETRICS AND GYNECOLOGY | Facility: HOSPITAL | Age: 31
End: 2021-06-09

## 2021-06-09 VITALS — WEIGHT: 252 LBS | SYSTOLIC BLOOD PRESSURE: 119 MMHG | BODY MASS INDEX: 43.26 KG/M2 | DIASTOLIC BLOOD PRESSURE: 75 MMHG

## 2021-06-09 DIAGNOSIS — E66.01 MORBID OBESITY WITH BMI OF 40.0-44.9, ADULT (HCC): ICD-10-CM

## 2021-06-09 DIAGNOSIS — Z34.90 PREGNANCY, UNSPECIFIED GESTATIONAL AGE: ICD-10-CM

## 2021-06-09 DIAGNOSIS — Z98.890 S/P MYOMECTOMY: Primary | ICD-10-CM

## 2021-06-09 DIAGNOSIS — O34.219 PREVIOUS CESAREAN DELIVERY AFFECTING PREGNANCY: ICD-10-CM

## 2021-06-09 DIAGNOSIS — Z98.891 PREVIOUS CESAREAN SECTION: ICD-10-CM

## 2021-06-09 DIAGNOSIS — Z82.79 FAMILY HISTORY OF CHROMOSOMAL ABNORMALITY: ICD-10-CM

## 2021-06-09 PROCEDURE — 99241 PR OFFICE CONSULTATION NEW/ESTAB PATIENT 15 MIN: CPT | Performed by: OBSTETRICS & GYNECOLOGY

## 2021-06-09 PROCEDURE — 76811 OB US DETAILED SNGL FETUS: CPT

## 2021-06-09 PROCEDURE — 76811 OB US DETAILED SNGL FETUS: CPT | Performed by: OBSTETRICS & GYNECOLOGY

## 2021-06-09 NOTE — PROGRESS NOTES
"Pt notices occasional FM. Denies lof or cramping. Reports no further vag bleeding (brown) x 3 wks. Reports her vision \"sometimes comes and goes\" with george. Her provider said it may be migraine-related. [id number]- neg. AFP- neg. Sees OB today.  "

## 2021-06-22 ENCOUNTER — ROUTINE PRENATAL (OUTPATIENT)
Dept: OBSTETRICS AND GYNECOLOGY | Facility: CLINIC | Age: 31
End: 2021-06-22

## 2021-06-22 VITALS — WEIGHT: 250 LBS | DIASTOLIC BLOOD PRESSURE: 88 MMHG | BODY MASS INDEX: 42.91 KG/M2 | SYSTOLIC BLOOD PRESSURE: 120 MMHG

## 2021-06-22 DIAGNOSIS — Z3A.22 22 WEEKS GESTATION OF PREGNANCY: Primary | ICD-10-CM

## 2021-06-22 LAB
EXPIRATION DATE: 0
GLUCOSE UR STRIP-MCNC: NEGATIVE MG/DL
Lab: 0
PROT UR STRIP-MCNC: NEGATIVE MG/DL

## 2021-06-22 PROCEDURE — 0502F SUBSEQUENT PRENATAL CARE: CPT | Performed by: NURSE PRACTITIONER

## 2021-07-08 ENCOUNTER — HOSPITAL ENCOUNTER (OUTPATIENT)
Dept: WOMENS IMAGING | Facility: HOSPITAL | Age: 31
Discharge: HOME | End: 2021-07-08

## 2021-07-08 ENCOUNTER — OFFICE VISIT (OUTPATIENT)
Dept: OBSTETRICS AND GYNECOLOGY | Facility: HOSPITAL | Age: 31
End: 2021-07-08

## 2021-07-08 ENCOUNTER — LAB (OUTPATIENT)
Dept: LAB | Facility: HOSPITAL | Age: 31
End: 2021-07-08

## 2021-07-08 VITALS — WEIGHT: 256 LBS | BODY MASS INDEX: 43.94 KG/M2

## 2021-07-08 DIAGNOSIS — E66.01 MORBID OBESITY WITH BMI OF 40.0-44.9, ADULT (HCC): ICD-10-CM

## 2021-07-08 DIAGNOSIS — Z34.90 PREGNANCY, UNSPECIFIED GESTATIONAL AGE: ICD-10-CM

## 2021-07-08 DIAGNOSIS — Z84.81 FAMILY HISTORY OF GENE MUTATION: ICD-10-CM

## 2021-07-08 DIAGNOSIS — Z98.890 S/P MYOMECTOMY: ICD-10-CM

## 2021-07-08 DIAGNOSIS — Z3A.25 25 WEEKS GESTATION OF PREGNANCY: Primary | ICD-10-CM

## 2021-07-08 DIAGNOSIS — O34.219 PREVIOUS CESAREAN DELIVERY AFFECTING PREGNANCY: ICD-10-CM

## 2021-07-08 LAB
ANION GAP SERPL CALCULATED.3IONS-SCNC: 9.7 MMOL/L (ref 5–15)
BILIRUB BLD-MCNC: NEGATIVE MG/DL
BUN SERPL-MCNC: 6 MG/DL (ref 6–20)
BUN/CREAT SERPL: 8.6 (ref 7–25)
CALCIUM SPEC-SCNC: 9 MG/DL (ref 8.6–10.5)
CHLORIDE SERPL-SCNC: 103 MMOL/L (ref 98–107)
CLARITY, POC: CLEAR
CO2 SERPL-SCNC: 24.3 MMOL/L (ref 22–29)
COLOR UR: YELLOW
CREAT SERPL-MCNC: 0.7 MG/DL (ref 0.57–1)
GFR SERPL CREATININE-BSD FRML MDRD: 98 ML/MIN/1.73
GLUCOSE SERPL-MCNC: 66 MG/DL (ref 65–99)
GLUCOSE UR STRIP-MCNC: NEGATIVE MG/DL
KETONES UR QL: NEGATIVE
LEUKOCYTE EST, POC: NEGATIVE
NITRITE UR-MCNC: NEGATIVE MG/ML
PH UR: 6 [PH] (ref 5–8)
POTASSIUM SERPL-SCNC: 4 MMOL/L (ref 3.5–5.2)
PROT UR STRIP-MCNC: NEGATIVE MG/DL
RBC # UR STRIP: NEGATIVE /UL
SODIUM SERPL-SCNC: 137 MMOL/L (ref 136–145)
SP GR UR: 1 (ref 1–1.03)
UROBILINOGEN UR QL: NORMAL

## 2021-07-08 PROCEDURE — 81229 CYTOG ALYS CHRML ABNR SNPCGH: CPT | Performed by: OBSTETRICS & GYNECOLOGY

## 2021-07-08 PROCEDURE — 76816 OB US FOLLOW-UP PER FETUS: CPT | Performed by: OBSTETRICS & GYNECOLOGY

## 2021-07-08 PROCEDURE — 36415 COLL VENOUS BLD VENIPUNCTURE: CPT | Performed by: OBSTETRICS & GYNECOLOGY

## 2021-07-08 PROCEDURE — 80048 BASIC METABOLIC PNL TOTAL CA: CPT | Performed by: OBSTETRICS & GYNECOLOGY

## 2021-07-08 PROCEDURE — 76816 OB US FOLLOW-UP PER FETUS: CPT

## 2021-07-08 NOTE — PROGRESS NOTES
"Pt denies all s/s PTL.  Reports \"having some dizziness sometimes.\" \"I think it's probably my blood sugar.\"   \"Think I may have GDM this time.\"  Next OB appt 7/22/21.  [id number] negative, male.  AFP-negative. Pt reports \"we had the microarrays drawn that were ordered but the sample was drawn in the wrong tube.\"  Have to have them redrawn.   "

## 2021-07-22 ENCOUNTER — ROUTINE PRENATAL (OUTPATIENT)
Dept: OBSTETRICS AND GYNECOLOGY | Facility: CLINIC | Age: 31
End: 2021-07-22

## 2021-07-22 VITALS — DIASTOLIC BLOOD PRESSURE: 86 MMHG | SYSTOLIC BLOOD PRESSURE: 124 MMHG | WEIGHT: 262.2 LBS | BODY MASS INDEX: 45.01 KG/M2

## 2021-07-22 DIAGNOSIS — Z3A.27 27 WEEKS GESTATION OF PREGNANCY: Primary | ICD-10-CM

## 2021-07-22 LAB
CLARITY, POC: CLEAR
COLOR UR: YELLOW
EXTERNAL GTT 1 HOUR: 102
GLUCOSE UR STRIP-MCNC: NEGATIVE MG/DL
PROT UR STRIP-MCNC: NEGATIVE MG/DL

## 2021-07-22 PROCEDURE — 0502F SUBSEQUENT PRENATAL CARE: CPT | Performed by: OBSTETRICS & GYNECOLOGY

## 2021-07-22 NOTE — PROGRESS NOTES
OB FOLLOW UP  CC- Here for care of pregnancy        Gunjan Engel is a 30 y.o.  27w0d patient being seen today for her obstetrical follow up. Patient reports headache. Patient stated that she does experience some vision changes.    Patient undergoing Glucola testing today. She is due for her testing at 11:30am.     MBT: O+  Rhogam Given: No  TDAP: declines  Breast Pump: declines  Flu Status: Declines  Ultrasound Today: No.    Her prenatal care is complicated by (and status) :    Patient Active Problem List   Diagnosis   • S/P myomectomy   • Endometriosis determined by laparoscopy   • Anxiety   • Morbid obesity with BMI of 40.0-44.9, adult (CMS/[facility])   • Family history of gene mutation   • Previous  delivery affecting pregnancy         ROS -   Patient Reports : Headaches  Patient Denies: Loss of Fluid, Vaginal Spotting, Vision Changes, Contractions and Epigastric pain  Fetal Movement : normal    The additional following portions of the patient's history were reviewed and updated as appropriate: allergies and current medications.    I have reviewed and agree with the HPI, ROS, and historical information as entered above. Luzmaria Forbes MD    /86   Wt 119 kg (262 lb 3.2 oz)   LMP 2020   BMI 45.01 kg/m²         EXAM:     FHT: 140 BPM   Uterine Size: appropriate  Pelvic Exam: No       Assessment and Plan    Problem List Items Addressed This Visit     None      Visit Diagnoses     27 weeks gestation of pregnancy    -  Primary    Relevant Orders    POC Urinalysis Dipstick (Completed)    Gestational Screen 1 Hr ([facility])    CBC (No Diff)    Antibody Screen          1. Pregnancy at 27w0d  2. Fetal status reassuring.   3. Activity and Exercise discussed.  4.  BS today    Luzmaria Forbes MD  2021

## 2021-07-23 LAB
BLD GP AB SCN SERPL QL: NEGATIVE
ERYTHROCYTE [DISTWIDTH] IN BLOOD BY AUTOMATED COUNT: 12.8 % (ref 12.3–15.4)
GLUCOSE 1H P 50 G GLC PO SERPL-MCNC: 102 MG/DL (ref 65–139)
HCT VFR BLD AUTO: 36.5 % (ref 34–46.6)
HGB BLD-MCNC: 12.1 G/DL (ref 12–15.9)
MCH RBC QN AUTO: 30.2 PG (ref 26.6–33)
MCHC RBC AUTO-ENTMCNC: 33.2 G/DL (ref 31.5–35.7)
MCV RBC AUTO: 91 FL (ref 79–97)
PLATELET # BLD AUTO: 267 10*3/MM3 (ref 140–450)
RBC # BLD AUTO: 4.01 10*6/MM3 (ref 3.77–5.28)
WBC # BLD AUTO: 12.14 10*3/MM3 (ref 3.4–10.8)

## 2021-07-30 LAB
# OF GENOTYPING TARGETS: NORMAL
ARRAY TYPE: NORMAL
CHROM ANALY OVERALL INTERP-IMP: NORMAL
CLINICAL CYTOGENETICIST SPEC: NORMAL
DIAGNOSTIC IMP SPEC-IMP: NORMAL
SPECIMEN SOURCE: NORMAL

## 2021-08-01 ENCOUNTER — HOSPITAL ENCOUNTER (OUTPATIENT)
Facility: HOSPITAL | Age: 31
End: 2021-08-01
Attending: OBSTETRICS & GYNECOLOGY | Admitting: OBSTETRICS & GYNECOLOGY

## 2021-08-01 ENCOUNTER — HOSPITAL ENCOUNTER (OUTPATIENT)
Facility: HOSPITAL | Age: 31
Setting detail: OBSERVATION
Discharge: HOME | End: 2021-08-01
Attending: OBSTETRICS & GYNECOLOGY | Admitting: OBSTETRICS & GYNECOLOGY

## 2021-08-01 VITALS
HEIGHT: 64 IN | SYSTOLIC BLOOD PRESSURE: 113 MMHG | BODY MASS INDEX: 44.73 KG/M2 | DIASTOLIC BLOOD PRESSURE: 80 MMHG | WEIGHT: 262 LBS | TEMPERATURE: 98.2 F | RESPIRATION RATE: 16 BRPM | OXYGEN SATURATION: 98 % | HEART RATE: 99 BPM

## 2021-08-01 PROBLEM — Z34.90 PREGNANCY: Status: ACTIVE | Noted: 2021-08-01

## 2021-08-01 LAB
EXPIRATION DATE: NORMAL
Lab: 1004
PROT UR STRIP-MCNC: NEGATIVE MG/DL

## 2021-08-01 PROCEDURE — 59025 FETAL NON-STRESS TEST: CPT

## 2021-08-01 PROCEDURE — 81002 URINALYSIS NONAUTO W/O SCOPE: CPT | Performed by: OBSTETRICS & GYNECOLOGY

## 2021-08-01 PROCEDURE — G0378 HOSPITAL OBSERVATION PER HR: HCPCS

## 2021-08-01 PROCEDURE — 99218 PR INITIAL OBSERVATION CARE/DAY 30 MINUTES: CPT | Performed by: OBSTETRICS & GYNECOLOGY

## 2021-08-01 PROCEDURE — 84112 EVAL AMNIOTIC FLUID PROTEIN: CPT | Performed by: OBSTETRICS & GYNECOLOGY

## 2021-08-01 PROCEDURE — 59025 FETAL NON-STRESS TEST: CPT | Performed by: OBSTETRICS & GYNECOLOGY

## 2021-08-01 RX ORDER — DOXYCYCLINE HYCLATE 50 MG/1
324 CAPSULE, GELATIN COATED ORAL
COMMUNITY
End: 2021-10-04 | Stop reason: HOSPADM

## 2021-08-01 NOTE — NURSING NOTE
Discharge education complete. Pt has no other concerns at this time. Will discharge to home per order.

## 2021-08-01 NOTE — PLAN OF CARE
Problem: Adult Inpatient Plan of Care  Goal: Plan of Care Review  Outcome: Met  Goal: Patient-Specific Goal (Individualized)  Outcome: Met  Goal: Absence of Hospital-Acquired Illness or Injury  Outcome: Met  Goal: Optimal Comfort and Wellbeing  Outcome: Met  Goal: Readiness for Transition of Care  Outcome: Met   Goal Outcome Evaluation:

## 2021-08-01 NOTE — H&P
[facility]  Obstetric History and Physical    Referring Provider: Luzmaria Forbes MD      CC: Leaking of fluid    Subjective     Patient is a 31 y.o. female  currently at 28w3d, who presents with c/o leaking of fluid.  Patient ports a small gush of clear fluid today without any abdominal pain, recent trauma, fever, urinary symptoms, or any other associate symptoms or concerns.  Patient ports normal fetal activity.  Prenatal care by Dr. Forbes.   course complicated by prior  and myomectomies.  She is followed by PDC and plan to have repeat  at 37 weeks gestation.        The following portions of the patients history were reviewed and updated as appropriate: current medications, allergies, past medical history, past surgical history, past family history, past social history and problem list .       Prenatal Information:   Maternal Prenatal Labs  Blood Type No results found for: ABO   Rh Status No results found for: RH   Antibody Screen No results found for: ABSCRN   Gonnorhea No results found for: GCCX   Chlamydia No results found for: CLAMYDCU   RPR No results found for: RPR   Syphilis Antibody No results found for: SYPHILIS   Rubella No results found for: RUBELLAIGGIN   Hepatitis B Surface Antigen No results found for: HEPBSAG   HIV-1 Antibody No results found for: LABHIV1   Hepatitis C Antibody No results found for: HEPCAB   Rapid Urin Drug Screen No results found for: AMPMETHU, BARBITSCNUR, LABBENZSCN, LABMETHSCN, LABOPIASCN, THCURSCR, COCAINEUR, AMPHETSCREEN, PROPOXSCN, BUPRENORSCNU, METAMPSCNUR, OXYCODONESCN, TRICYCLICSCN   Group B Strep Culture No results found for: GBSANTIGEN           External Prenatal Results     Pregnancy Outside Results - Transcribed From Office Records - See Scanned Records For Details     Test Value Date Time    ABO  O  21 1121    Rh  Positive  21 1121    Antibody Screen  Negative  21 1127       Negative  21 1121    Varicella IgG        Rubella  4.28 index 21 1121    Hgb  12.1 g/dL 21 1127       12.1 g/dL 21 1121    Hct  36.5 % 21 1127       36.8 % 21 1121    Glucose Fasting GTT       Glucose Tolerance Test 1 hour ^ 102  21     Glucose Tolerance Test 3 hour       Gonorrhea (discrete) ^ NEG  16     Chlamydia (discrete) ^ NEG  16     RPR  Non Reactive  21 1121    VDRL       Syphilis Antibody       HBsAg  Negative  21 1121    Herpes Simplex Virus PCR       Herpes Simplex VIrus Culture       HIV  Non Reactive  21 1121    Hep C RNA Quant PCR       Hep C Antibody  <0.1 s/co ratio 21 1121    AFP  50.7 ng/mL 21     Group B Strep       GBS Susceptibility to Clindamycin       GBS Susceptibility to Erythromycin       Fetal Fibronectin       Genetic Testing, Maternal Blood             Drug Screening     Test Value Date Time    Urine Drug Screen       Amphetamine Screen  Negative ng/mL 21 1121    Barbiturate Screen  Negative ng/mL 21 1121    Benzodiazepine Screen  Negative ng/mL 21 1121    Methadone Screen  Negative ng/mL 21 1121    Phencyclidine Screen  Negative ng/mL 21 1121    Opiates Screen       THC Screen       Cocaine Screen       Propoxyphene Screen  Negative ng/mL 21 1121    Buprenorphine Screen       Methamphetamine Screen       Oxycodone Screen       Tricyclic Antidepressants Screen             Legend    ^: Historical                          Past OB History:       OB History    Para Term  AB Living   4 1 1 0 2 1   SAB TAB Ectopic Molar Multiple Live Births   2 0 0 0 0 1      # Outcome Date GA Lbr Des/2nd Weight Sex Delivery Anes PTL Lv   4 Current            3 SAB 20 9w0d          2 Term 17 37w3d  2275 g (5 lb 0.3 oz) M CS-LTranv Spinal N SADIQ      Name: Isaac Nixon      Apgar1: 6  Apgar5: 8   1 2009 7w0d             Obstetric Comments   First preg was twins- one twin passed in utero  early in pregnancy   FOB 1- 1st preg   FOB 2- 2nd-4th preg       Past Medical History: Past Medical History:   Diagnosis Date   • Anxiety    • Asthma    • Depression    • Endometriosis determined by laparoscopy     History of 3 laparoscopies Dr. Evgeny Varela   • History of uterine fibroid     ,    • Hypertension     H/O   • Migraine    • Ovarian cyst    • PCOS (polycystic ovarian syndrome)    • Polycystic ovary syndrome    • Post partum depression 2016      Past Surgical History Past Surgical History:   Procedure Laterality Date   •  SECTION Bilateral 2017    Procedure:  SECTION REPEAT;  Surgeon: Cheikh Bonilla MD;  Location: [facility] LABOR DELIVERY;  Service:    • DIAGNOSTIC LAPAROSCOPY      TIMES 4   • MYOMECTOMY  ,       Family History: Family History   Problem Relation Age of Onset   • Colon cancer Father 40   • Osteoporosis Paternal Grandmother    • Breast cancer Maternal Aunt 40   • Ovarian cancer Other    • Ovarian cancer Mother 36      Social History:  reports that she has never smoked. She has never used smokeless tobacco.   reports previous alcohol use of about 1.0 standard drinks of alcohol per week.   reports no history of drug use.                   General ROS Negative Findings:Headaches, Visual Changes, Epigastric pain, Anorexia, Nausia/Vomiting and Vaginal Bleeding    ROS     All other systems have been reviewed and are neg  Objective       Vital Signs Range for the last 24 hours  Temperature: Temp:  [98.2 °F (36.8 °C)] 98.2 °F (36.8 °C)   Temp Source: Temp src: Oral   BP: BP: (113)/(80) 113/80   Pulse: Heart Rate:  [99] 99   Respirations: Resp:  [16] 16   SPO2: SpO2:  [98 %] 98 %   O2 Amount (l/min):     O2 Devices Device (Oxygen Therapy): room air   Weight: Weight:  [119 kg (262 lb)] 119 kg (262 lb)     Physical Examination:   General:   alert, appears stated age and cooperative   Skin:   normal   HEENT:  Sclera clear   Lungs:      Heart:       Gastrointestinal:  Abdomen soft, gravid uterus, guarding benign exam   Lower Extremities:  No edema, no calf tenderness   : Exam deferred.   Musculoskeletal:   No gross deformities,   Neuro:  No focal deficits DTR 2+ 4 no clonus               Fetal Heart Rate Assessment   Method: Fetal HR Assessment Method: external   Beats/min: Fetal HR (beats/min): 140   Baseline: Fetal Heart Baseline Rate: normal range   Varibility: Fetal HR Variability: moderate (amplitude range 6 to 25 bpm)   Accels: Fetal HR Accelerations: greater than/equal to 15 bpm, lasting at least 15 seconds   Decels: Fetal HR Decelerations: absent   Tracing Category:     NST-indications rupture membranes, interpretation reactive, moderate variability, accelerations present 10 x 10, no decelerations noted, onset 1828, end time 1401, no contractions noted  Uterine Assessment   Method: Method: external tocotransducer   Frequency (min):     Ctx Count in 10 min:     Duration:     Intensity: Contraction Intensity: no contractions   Intensity by IUPC:     Resting Tone:     Resting Tone by IUPC:     [place] Units:       Laboratory Results:   Lab Results (last 24 hours)     Procedure Component Value Units Date/Time    POC Protein, Urine, Qualitative, Dipstick [[id number]]  (Normal) Collected: 08/01/21 1543    Specimen: Urine Updated: 08/01/21 1543     Protein, POC Negative mg/dL      Lot Number [id number]     Expiration Date 10/30/21    GTT 1 Hour [[id number]] Resulted: 07/22/21    Specimen: Blood Updated: 08/01/21 1511     External GTT 1 Hour 102        Radiology Review:   Imaging Results (Last 24 Hours)     ** No results found for the last 24 hours. **        Other Studies: Bedside portable ultrasound max vertical pocket 5.2 cm is active                                       AmniSure negative  Assessment/Plan       Pregnancy        Assessment:  1.  Intrauterine pregnancy at 28w3d weeks gestation with reactive fetal status.    2.  False labor no evidence of  rupture membranes negative AmniSure and normal Neotic fluid  3.  History of prior  and myomectomy, planned repeat section at 37 weeks gestation  4.      Plan:  1.  Discharged home, kick count,  instructions, follow-up with Dr. Leidy godfrey for routine  visit or as needed  2. Plan of care has been reviewed with patient.  3.  Risks, benefits of treatment plan have been discussed.  4.  All questions have been answered.  5      Peter Taveras DO  2021  16:11 EDT

## 2021-08-03 LAB — POC AMNISURE: NEGATIVE

## 2021-08-03 PROCEDURE — 84112 EVAL AMNIOTIC FLUID PROTEIN: CPT | Performed by: OBSTETRICS & GYNECOLOGY

## 2021-08-13 ENCOUNTER — ROUTINE PRENATAL (OUTPATIENT)
Dept: OBSTETRICS AND GYNECOLOGY | Facility: CLINIC | Age: 31
End: 2021-08-13

## 2021-08-13 VITALS — WEIGHT: 270.4 LBS | SYSTOLIC BLOOD PRESSURE: 118 MMHG | BODY MASS INDEX: 46.41 KG/M2 | DIASTOLIC BLOOD PRESSURE: 86 MMHG

## 2021-08-13 DIAGNOSIS — O34.219 PREVIOUS CESAREAN DELIVERY AFFECTING PREGNANCY: ICD-10-CM

## 2021-08-13 DIAGNOSIS — Z3A.30 30 WEEKS GESTATION OF PREGNANCY: Primary | ICD-10-CM

## 2021-08-13 DIAGNOSIS — Z98.890 S/P MYOMECTOMY: ICD-10-CM

## 2021-08-13 LAB
CLARITY, POC: CLEAR
COLOR UR: YELLOW
GLUCOSE UR STRIP-MCNC: NEGATIVE MG/DL
PROT UR STRIP-MCNC: NEGATIVE MG/DL

## 2021-08-13 PROCEDURE — 0502F SUBSEQUENT PRENATAL CARE: CPT | Performed by: OBSTETRICS & GYNECOLOGY

## 2021-08-13 NOTE — PROGRESS NOTES
OB FOLLOW UP  CC- Here for care of pregnancy        Gunjan Engel is a 31 y.o.  30w1d patient being seen today for her obstetrical follow up visit. Patient reports headache, occasional contractions and cramping.     Her prenatal care is complicated by (and status) :    Patient Active Problem List   Diagnosis   • S/P myomectomy   • Endometriosis determined by laparoscopy   • Anxiety   • Morbid obesity with BMI of 40.0-44.9, adult (CMS/HCC)   • Family history of gene mutation   • Previous  delivery affecting pregnancy   • Pregnancy       Flu Status: Declines  Ultrasound Today: No.    ROS -   Patient Reports : Headaches, Cramping and Contractions  Patient Denies: Loss of Fluid, Vaginal Spotting, Vision Changes and Headaches  Fetal Movement : normal  All other systems reviewed and are negative.       The additional following portions of the patient's history were reviewed and updated as appropriate: allergies and current medications.    I have reviewed and agree with the HPI, ROS, and historical information as entered above. Luzmaria Forbes MD    /86   Wt 123 kg (270 lb 6.4 oz)   LMP 2020   BMI 46.41 kg/m²       EXAM:     FHT:  140  BPM   Uterine Size: appropriate  Pelvic Exam: No    Urine glucose/protein: See prenatal flowsheet       Assessment and Plan    Problem List Items Addressed This Visit        Gravid and     Pregnancy - Primary    Relevant Orders    POC Urinalysis Dipstick (Completed)          1. Pregnancy at 30w1d  2. Fetal status reassuring.   3. Activity and Exercise discussed.      Luzmaria Forbes MD  2021

## 2021-08-24 ENCOUNTER — ROUTINE PRENATAL (OUTPATIENT)
Dept: OBSTETRICS AND GYNECOLOGY | Facility: CLINIC | Age: 31
End: 2021-08-24

## 2021-08-24 VITALS — SYSTOLIC BLOOD PRESSURE: 138 MMHG | WEIGHT: 172 LBS | BODY MASS INDEX: 29.52 KG/M2 | DIASTOLIC BLOOD PRESSURE: 84 MMHG

## 2021-08-24 DIAGNOSIS — Z3A.31 31 WEEKS GESTATION OF PREGNANCY: Primary | ICD-10-CM

## 2021-08-24 LAB
GLUCOSE UR STRIP-MCNC: NEGATIVE MG/DL
PROT UR STRIP-MCNC: NEGATIVE MG/DL

## 2021-08-24 PROCEDURE — 0502F SUBSEQUENT PRENATAL CARE: CPT | Performed by: OBSTETRICS & GYNECOLOGY

## 2021-08-24 NOTE — PROGRESS NOTES
OB FOLLOW UP    Gunjan Engel is a 31 y.o.  31w5d patient being seen today for her obstetrical follow up visit. Patient reports headache. With vision changes    Her prenatal care is complicated by (and status) : 1st child with microdeletion-2P16.3  MBT O positive  Prev C/S  h/o myomectomy  h/o PIH  h/o severe PP depression    Her kick counts are adequate    ROS -   Pt has BH CTX's not more than 5 in one hour  Vaginal bleeding denies. Pt has not intercourse since spotting 2 months ago    /84   Wt 78 kg (172 lb)   LMP 2020   BMI 29.52 kg/m²     FHT:  present   Pelvic Exam: Performed: No     Assessment    1. Pregnancy at 31w5d  2. Fetal status reassuring  3. Ultrasound was performed.         Problem List Items Addressed This Visit        Gravid and     Pregnancy - Primary    Relevant Orders    POC Urinalysis Dipstick (Completed)          Plan      1. Reviewed kick counts.  2. Reviewed routine prenatal care with the office and educational materials given  3. Ultrasound today was breech and normal for growth.  4. Follow up: 2 weeks  5. Declines TDAP and COVID  6. Call for any problems    Luzmaria Forbes MD  2021

## 2021-08-31 ENCOUNTER — LAB (OUTPATIENT)
Dept: LAB | Facility: HOSPITAL | Age: 31
End: 2021-08-31

## 2021-08-31 ENCOUNTER — TRANSCRIBE ORDERS (OUTPATIENT)
Dept: LAB | Facility: HOSPITAL | Age: 31
End: 2021-08-31

## 2021-08-31 DIAGNOSIS — Z20.822 COVID-19 RULED OUT: Primary | ICD-10-CM

## 2021-08-31 DIAGNOSIS — Z20.822 COVID-19 RULED OUT: ICD-10-CM

## 2021-08-31 PROCEDURE — U0004 COV-19 TEST NON-CDC HGH THRU: HCPCS

## 2021-09-01 ENCOUNTER — TELEPHONE (OUTPATIENT)
Dept: INFECTIOUS DISEASES | Facility: HOSPITAL | Age: 31
End: 2021-09-01

## 2021-09-01 LAB — SARS-COV-2 RNA NOSE QL NAA+PROBE: DETECTED

## 2021-09-01 NOTE — TELEPHONE ENCOUNTER
Patient notified of COVID-19 positive result.  Referred patient to COVID positive letter from [facility].  No further instruction at this time.

## 2021-09-14 ENCOUNTER — ROUTINE PRENATAL (OUTPATIENT)
Dept: OBSTETRICS AND GYNECOLOGY | Facility: CLINIC | Age: 31
End: 2021-09-14

## 2021-09-14 VITALS — WEIGHT: 278 LBS | BODY MASS INDEX: 47.72 KG/M2 | DIASTOLIC BLOOD PRESSURE: 70 MMHG | SYSTOLIC BLOOD PRESSURE: 114 MMHG

## 2021-09-14 DIAGNOSIS — Z34.90 PREGNANCY, UNSPECIFIED GESTATIONAL AGE: Primary | ICD-10-CM

## 2021-09-14 DIAGNOSIS — Z3A.34 34 WEEKS GESTATION OF PREGNANCY: ICD-10-CM

## 2021-09-14 DIAGNOSIS — Z86.16 HISTORY OF COVID-19: ICD-10-CM

## 2021-09-14 DIAGNOSIS — O36.8130 DECREASED FETAL MOVEMENTS IN THIRD TRIMESTER, SINGLE OR UNSPECIFIED FETUS: ICD-10-CM

## 2021-09-14 DIAGNOSIS — B37.31 VAGINAL CANDIDIASIS: ICD-10-CM

## 2021-09-14 LAB
EXPIRATION DATE: 0
GLUCOSE UR STRIP-MCNC: NEGATIVE MG/DL
Lab: 0
PROT UR STRIP-MCNC: NEGATIVE MG/DL

## 2021-09-14 PROCEDURE — 59025 FETAL NON-STRESS TEST: CPT | Performed by: OBSTETRICS & GYNECOLOGY

## 2021-09-14 PROCEDURE — 0502F SUBSEQUENT PRENATAL CARE: CPT | Performed by: OBSTETRICS & GYNECOLOGY

## 2021-09-14 NOTE — PROGRESS NOTES
OB FOLLOW UP    Gunjan Engel is a 31 y.o.  34w5d patient being seen today for her obstetrical follow up visit. Patient reports abnormal vaginal discharge (post-antibiotic use); first noted 2-3 days ago. Headaches from coughing fits. Occasional and intermittent BLE. Has been taking Robitussin DM and emergency inhaler for persistent cough.    Her prenatal care is complicated by (and status): history C/S, hx PIH, hx PPD, COVID+ , BMI    Her kick counts have been decreased for 2 weeks (since taking cough syrup)    The additional following portions of the patient's history were reviewed and updated as appropriate: allergies, current medications, past family history, past medical history, past social history, past surgical history and problem list.      ROS -   Symptoms: see above   Vaginal bleeding: none    /70   Wt 126 kg (278 lb)   LMP 2020   BMI 47.72 kg/m²     FHT:  present   Pelvic Exam: Performed: No     NST NOTE    Indiction :   Recent COVID  FHR:          Reactive, Cat 1, No decels  Contractions:    Irregular  Time Monitored:   > 20 minutes    Assessment    1. Pregnancy at 34w5d  2. Fetal status reassuring  3. Covid positive   4. Last U/S 21  5. TDAP was not already given; patient declined    Problem List Items Addressed This Visit        Gravid and     Pregnancy - Primary    Relevant Orders    Fetal Nonstress Test    POC Glucose, Urine, Qualitative, Dipstick (Completed)    POC Protein, Urine, Qualitative, Dipstick (Completed)      Other Visit Diagnoses     Decreased fetal movements in third trimester, single or unspecified fetus        Relevant Orders    Fetal Nonstress Test          Plan    1. Reviewed kick counts.  2. Recent covid.  Baby ASA until delivery, biweekly NST.  Plain robitussin for cough  3. Yeast infection- Terazol sent in  4. Reviewed routine prenatal care with the office and educational materials given  5. Follow up: 4 days  6. Call for any  problems    Luzmaria Forbes MD  09/14/2021

## 2021-09-17 ENCOUNTER — ROUTINE PRENATAL (OUTPATIENT)
Dept: OBSTETRICS AND GYNECOLOGY | Facility: CLINIC | Age: 31
End: 2021-09-17

## 2021-09-17 VITALS — WEIGHT: 278.8 LBS | DIASTOLIC BLOOD PRESSURE: 88 MMHG | BODY MASS INDEX: 47.86 KG/M2 | SYSTOLIC BLOOD PRESSURE: 130 MMHG

## 2021-09-17 DIAGNOSIS — Z86.16 HISTORY OF COVID-19: ICD-10-CM

## 2021-09-17 DIAGNOSIS — Z3A.35 35 WEEKS GESTATION OF PREGNANCY: Primary | ICD-10-CM

## 2021-09-17 PROCEDURE — 59025 FETAL NON-STRESS TEST: CPT | Performed by: NURSE PRACTITIONER

## 2021-09-17 PROCEDURE — 0502F SUBSEQUENT PRENATAL CARE: CPT | Performed by: NURSE PRACTITIONER

## 2021-09-17 NOTE — PROGRESS NOTES
OB FOLLOW UP  CC- Here for care of pregnancy        Gunjan Engel is a 31 y.o.  35w1d patient being seen today for her obstetrical follow up visit. Patient reports occasional contractions, cramping and headaches.  She is taking a bASA.    Her prenatal care is complicated by (and status) :    Patient Active Problem List   Diagnosis   • S/P myomectomy   • Endometriosis determined by laparoscopy   • Anxiety   • Morbid obesity with BMI of 40.0-44.9, adult (CMS/HCC)   • Family history of gene mutation   • Previous  delivery affecting pregnancy   • Pregnancy   • History of COVID-19       Flu Status: Declines  Ultrasound Today: No.    ROS -   Patient Reports : Headaches, Cramping and Contractions  Patient Denies: Loss of Fluid and Vaginal Spotting  Fetal Movement : normal  All other systems reviewed and are negative.       The additional following portions of the patient's history were reviewed and updated as appropriate: allergies and current medications.    I have reviewed and agree with the HPI, ROS, and historical information as entered above. Marissa Hernandez, APRN    /88   Wt 126 kg (278 lb 12.8 oz)   LMP 2020   BMI 47.86 kg/m²       EXAM:     FHT: wnl BPM   Uterine Size:  deferred  Pelvic Exam: deferred    Urine glucose/protein: See prenatal flowsheet       Assessment and Plan    Problem List Items Addressed This Visit        Gravid and     Pregnancy - Primary    Relevant Orders    POC Urinalysis Dipstick (Completed)       Other    History of COVID-19          1. Pregnancy at 35w1d  2. Fetal status reassuring.   3. Activity and Exercise discussed.  Rev daily [facility], CHANDRA prec, preecl prec.  Return in about 4 days (around 2021) for NST.    Marissa Hernandez, APRN  2021

## 2021-09-21 ENCOUNTER — ROUTINE PRENATAL (OUTPATIENT)
Dept: OBSTETRICS AND GYNECOLOGY | Facility: CLINIC | Age: 31
End: 2021-09-21

## 2021-09-21 VITALS — DIASTOLIC BLOOD PRESSURE: 72 MMHG | SYSTOLIC BLOOD PRESSURE: 112 MMHG | BODY MASS INDEX: 47.72 KG/M2 | WEIGHT: 278 LBS

## 2021-09-21 DIAGNOSIS — Z86.16 HISTORY OF COVID-19: ICD-10-CM

## 2021-09-21 DIAGNOSIS — Z3A.35 35 WEEKS GESTATION OF PREGNANCY: ICD-10-CM

## 2021-09-21 PROCEDURE — 0502F SUBSEQUENT PRENATAL CARE: CPT | Performed by: OBSTETRICS & GYNECOLOGY

## 2021-09-21 PROCEDURE — 59025 FETAL NON-STRESS TEST: CPT | Performed by: OBSTETRICS & GYNECOLOGY

## 2021-09-21 NOTE — PROGRESS NOTES
OB FOLLOW UP    Gunjan Engel is a 31 y.o.  35w5d patient being seen today for her obstetrical follow up visit. Patient reports occ ctxs, pelvic pressure.     Her prenatal care is complicated by (and status) : Previous  section, Covid, Myomectomy    Her kick counts are adequate    The additional following portions of the patient's history were reviewed and updated as appropriate: allergies, current medications, past family history, past medical history, past social history, past surgical history and problem list.      ROS -   ctxs, pressure   Vaginal bleeding none    /72   Wt 126 kg (278 lb)   LMP 2020   BMI 47.72 kg/m²     FHT:  present   Pelvic Exam: Performed: No   NST NOTE    Indiction :   Previous Covid infection  FHR:          Reactive, Cat 1, No decels  Contractions:    Irregular  Time Monitored:   > 20 minutes    Assessment    1. Pregnancy at 35w5d  2. Fetal status reassuring  3. TDAP was not already given. Pt declines    Problem List Items Addressed This Visit     None          Plan    1. NST  2. Reviewed kick counts.  3. Reviewed routine prenatal care with the office and educational materials given  4. Follow up: 4 days   5. Call for any problems in interim.    Luzmaria Forbes MD  2021

## 2021-09-23 ENCOUNTER — HOSPITAL ENCOUNTER (OUTPATIENT)
Facility: HOSPITAL | Age: 31
Discharge: HOME | End: 2021-09-23
Attending: OBSTETRICS & GYNECOLOGY | Admitting: OBSTETRICS & GYNECOLOGY

## 2021-09-23 ENCOUNTER — TELEPHONE (OUTPATIENT)
Dept: OBSTETRICS AND GYNECOLOGY | Facility: CLINIC | Age: 31
End: 2021-09-23

## 2021-09-23 VITALS
OXYGEN SATURATION: 97 % | HEART RATE: 107 BPM | WEIGHT: 278 LBS | DIASTOLIC BLOOD PRESSURE: 88 MMHG | TEMPERATURE: 98 F | BODY MASS INDEX: 47.46 KG/M2 | SYSTOLIC BLOOD PRESSURE: 121 MMHG | HEIGHT: 64 IN | RESPIRATION RATE: 18 BRPM

## 2021-09-23 PROCEDURE — 59025 FETAL NON-STRESS TEST: CPT

## 2021-09-23 PROCEDURE — G0463 HOSPITAL OUTPT CLINIC VISIT: HCPCS

## 2021-09-23 PROCEDURE — 59025 FETAL NON-STRESS TEST: CPT | Performed by: OBSTETRICS & GYNECOLOGY

## 2021-09-23 PROCEDURE — 99214 OFFICE O/P EST MOD 30 MIN: CPT | Performed by: OBSTETRICS & GYNECOLOGY

## 2021-09-23 RX ORDER — CYCLOBENZAPRINE HCL 10 MG
10 TABLET ORAL ONCE
Status: COMPLETED | OUTPATIENT
Start: 2021-09-23 | End: 2021-09-23

## 2021-09-23 RX ORDER — ACETAMINOPHEN 500 MG
1000 TABLET ORAL EVERY 6 HOURS PRN
COMMUNITY
End: 2021-10-04 | Stop reason: HOSPADM

## 2021-09-23 RX ORDER — CYCLOBENZAPRINE HCL 10 MG
10 TABLET ORAL 3 TIMES DAILY PRN
Qty: 15 TABLET | Refills: 0 | Status: SHIPPED | OUTPATIENT
Start: 2021-09-23 | End: 2021-09-28

## 2021-09-23 RX ADMIN — CYCLOBENZAPRINE 10 MG: 10 TABLET, FILM COATED ORAL at 16:37

## 2021-09-23 NOTE — H&P
"[facility]  Obstetric History and Physical    Chief Complaint   Patient presents with   • Rib Pain       Subjective     Patient is a 31 y.o. female  currently at 36w0d, who presents with a complaint of an acute onset of left sided rib pain.  She was COVID positive a month ago and has chronic asthma, so has a lingering cough that she has been dealing with.  Earlier today she had a coughing spell and heard and felt a \"pop\"  She had instant pain in that area that was exacerbated by deep breathing, moving, laughing and coughing.  She thought she may have broken a rib.    She started using a lidocaine patch with little help.  She denies SOA or other chest pain.  She has no specific pregnancy complaints.         The following portions of the patients history were reviewed and updated as appropriate: current medications, allergies, past medical history, past surgical history, past family history, past social history and problem list .       Prenatal Information:   Maternal Prenatal Labs  Blood Type No results found for: ABO   Rh Status No results found for: RH   Antibody Screen No results found for: ABSCRN   Gonnorhea No results found for: GCCX   Chlamydia No results found for: CLAMYDCU   RPR No results found for: RPR   Syphilis Antibody No results found for: SYPHILIS   Rubella No results found for: RUBELLAIGGIN   Hepatitis B Surface Antigen No results found for: HEPBSAG   HIV-1 Antibody No results found for: LABHIV1   Hepatitis C Antibody No results found for: HEPCAB   Rapid Urin Drug Screen No results found for: AMPMETHU, BARBITSCNUR, LABBENZSCN, LABMETHSCN, LABOPIASCN, THCURSCR, COCAINEUR, AMPHETSCREEN, PROPOXSCN, BUPRENORSCNU, METAMPSCNUR, OXYCODONESCN, TRICYCLICSCN   Group B Strep Culture No results found for: GBSANTIGEN           External Prenatal Results     Pregnancy Outside Results - Transcribed From Office Records - See Scanned Records For Details     Test Value Date Time    ABO  O  21 1121    Rh  " Positive  21 1121    Antibody Screen  Negative  21 1127       Negative  21 1121    Varicella IgG       Rubella  4.28 index 21 1121    Hgb  12.1 g/dL 21 1127       12.1 g/dL 21 1121    Hct  36.5 % 21 1127       36.8 % 21 1121    Glucose Fasting GTT       Glucose Tolerance Test 1 hour ^ 102  21     Glucose Tolerance Test 3 hour       Gonorrhea (discrete) ^ NEG  16     Chlamydia (discrete) ^ NEG  16     RPR  Non Reactive  21 1121    VDRL       Syphilis Antibody       HBsAg  Negative  21 1121    Herpes Simplex Virus PCR       Herpes Simplex VIrus Culture       HIV  Non Reactive  21 1121    Hep C RNA Quant PCR       Hep C Antibody  <0.1 s/co ratio 21 1121    AFP  50.7 ng/mL 21     Group B Strep       GBS Susceptibility to Clindamycin       GBS Susceptibility to Erythromycin       Fetal Fibronectin       Genetic Testing, Maternal Blood             Drug Screening     Test Value Date Time    Urine Drug Screen       Amphetamine Screen  Negative ng/mL 21 1121    Barbiturate Screen  Negative ng/mL 21 1121    Benzodiazepine Screen  Negative ng/mL 21 1121    Methadone Screen  Negative ng/mL 21 1121    Phencyclidine Screen  Negative ng/mL 21 1121    Opiates Screen       THC Screen       Cocaine Screen       Propoxyphene Screen  Negative ng/mL 21 1121    Buprenorphine Screen       Methamphetamine Screen       Oxycodone Screen       Tricyclic Antidepressants Screen             Legend    ^: Historical                          Past OB History:     OB History    Para Term  AB Living   4 1 1 0 2 1   SAB TAB Ectopic Molar Multiple Live Births   2 0 0 0 0 1      # Outcome Date GA Lbr Des/2nd Weight Sex Delivery Anes PTL Lv   4 Current            3 SAB 20 9w0d          2 Term 17 37w3d  2275 g (5 lb 0.3 oz) M CS-LTranv Spinal N SADIQ      Name: Isaac Nixon       Apgar1: 6  Apgar5: 8   1 SAB  7w0d             Obstetric Comments   First preg was twins- one twin passed in utero early in pregnancy   FOB 1- 1st preg   FOB 2- 2nd-4th preg       Past Medical History: Past Medical History:   Diagnosis Date   • Anxiety    • Asthma    • Depression    • Endometriosis determined by laparoscopy     History of 3 laparoscopies Dr. Evgeny Varela   • History of COVID-19 2021   • History of uterine fibroid     ,    • Hypertension     H/O   • Migraine    • Ovarian cyst    • PCOS (polycystic ovarian syndrome)    • Polycystic ovary syndrome    • Post partum depression 2016      Past Surgical History Past Surgical History:   Procedure Laterality Date   •  SECTION Bilateral 2017    Procedure:  SECTION REPEAT;  Surgeon: Cheikh Bonilla MD;  Location: [facility] LABOR DELIVERY;  Service:    • DIAGNOSTIC LAPAROSCOPY      TIMES 4   • MYOMECTOMY  ,       Family History: Family History   Problem Relation Age of Onset   • Colon cancer Father 40   • Osteoporosis Paternal Grandmother    • Breast cancer Maternal Aunt 40   • Ovarian cancer Other    • Ovarian cancer Mother 36      Social History:  reports that she has never smoked. She has never used smokeless tobacco.   reports previous alcohol use of about 1.0 standard drinks of alcohol per week.   reports no history of drug use.        Review of Systems  Denies fever, HA, muscle weakness,                                             and rashes      Objective     Vital Signs Range for the last 24 hours  Temperature: Temp:  [98 °F (36.7 °C)] 98 °F (36.7 °C)   Temp Source: Temp src: Oral   BP: BP: (121)/(88) 121/88   Pulse: Heart Rate:  [107] 107   Respirations: Resp:  [18] 18   SPO2: SpO2:  [97 %] 97 %   O2 Amount (l/min):     O2 Devices     Weight: Weight:  [126 kg (278 lb)] 126 kg (278 lb)     Physical Examination: General appearance - alert,  and in no distress and oriented to person, place, and time  Chest  - clear to auscultation, no wheezes, rales or rhonchi, symmetric air entry  Heart - S1 and S2 normal, no murmurs noted  Abdomen - soft, nontender, nondistended, no masses or organomegaly  no rebound tenderness noted  bowel sounds normal  No guarding, No RUQ pain.  Some point tenderness in the left upper quadrant over ribs  Extremities - pedal edema - None                           Fetal Heart Rate Assessment   Method:     Beats/min:     Baseline:  130s   Varibility:  mod   Accels:  y   Decels:  n   Tracing Category:  1     Uterine Assessment   Method: Method: none, per patient report   Frequency (min):     Ctx Count in 10 min:     Duration:     Intensity:     Intensity by IUPC:     Resting Tone:     Resting Tone by IUPC:     Dwight Units:      NST                     Indication:  Rib pain  Start time 16:12                 End time:  16:40  15 x 15 accels x 2  Yes  No decels  Baseline   130s  Reactive NST - Yes             Assessment:  1. Intrauterine pregnancy at 36w0d weeks gestation with reactive fetal status  2. Musculoskeletal  Rib pain likely pulled intercostal muscle   3. Previous C/S and myomectomy    Plan:  1. Reassuring fetal status reative   2. Muscle strain - Flexeril given with prescription sent.   3.  Given education and reassurance   4.  All questions have been answered.  5.  D/C home      Davonte Mendoza MD  9/23/2021  16:59 EDT

## 2021-09-23 NOTE — TELEPHONE ENCOUNTER
Patient called and said that the pain in her ribcage has become worse. She is having a hard time breathing. She called L&D and was told to come on in to the hospital. She says that she was told it could be either a broken/crcjed rib or pleurisy. She says the pain is more than what Tylenol is now able to control. Patient says she is now out of quarantine for Covid. This Rn will inform LOS.

## 2021-09-24 ENCOUNTER — ROUTINE PRENATAL (OUTPATIENT)
Dept: OBSTETRICS AND GYNECOLOGY | Facility: CLINIC | Age: 31
End: 2021-09-24

## 2021-09-24 VITALS — BODY MASS INDEX: 47.96 KG/M2 | DIASTOLIC BLOOD PRESSURE: 80 MMHG | WEIGHT: 279.4 LBS | SYSTOLIC BLOOD PRESSURE: 120 MMHG

## 2021-09-24 DIAGNOSIS — Z3A.36 36 WEEKS GESTATION OF PREGNANCY: Primary | ICD-10-CM

## 2021-09-24 LAB
GLUCOSE UR STRIP-MCNC: NEGATIVE MG/DL
PROT UR STRIP-MCNC: NEGATIVE MG/DL

## 2021-09-24 PROCEDURE — 59025 FETAL NON-STRESS TEST: CPT | Performed by: OBSTETRICS & GYNECOLOGY

## 2021-09-24 PROCEDURE — 87081 CULTURE SCREEN ONLY: CPT | Performed by: OBSTETRICS & GYNECOLOGY

## 2021-09-24 PROCEDURE — 0502F SUBSEQUENT PRENATAL CARE: CPT | Performed by: OBSTETRICS & GYNECOLOGY

## 2021-09-24 NOTE — PROGRESS NOTES
Low-Dose CT: Lung Cancer Screening  Criteria:  • Age 55-77 years of age (CMS) , 55-80 years of age (Commercial) and;  o Patient Age: 31 y.o.  • 30 pack-year smoking history (# yrs smoked X avg #ppd = pack/yrs); if  pt has quit smoking it must be within <15yrs and;  • Asymptomatic for lung cancer  ICD-10 Codes:  • History of smoking  • Tobacco abuse/addiction  ? Z72.0 (current smoker)  ? Z87.891 (personal history of smoking/nicotine dependence) use with CMS   • Patient Smoking History  Social History     Tobacco Use   Smoking Status Never Smoker   ?   CPT Codes:  • 64971 low dose (must say low dose otherwise is CT without contrast)  / (for patients with [facility] and CMS

## 2021-09-24 NOTE — PROGRESS NOTES
OB FOLLOW UP    Gunjan Engel is a 31 y.o.  36w1d patient being seen today for her obstetrical follow up visit. Patient reports no bleeding. Patient went to L&D yesterday and was told that she had a cracked rib on her left side. She was told that they were unable to do an x-ray because of how the baby is laying. Patient to have GBS today.     Her prenatal care is complicated by (and status) :  history C/S, hx PIH, hx PPD, COVID+ , BMI    Her kick counts are adequate    The additional following portions of the patient's history were reviewed and updated as appropriate: allergies, current medications, past family history, past medical history, past social history, past surgical history and problem list.      ROS -   None     Vaginal bleeding Denies    /80   Wt 127 kg (279 lb 6.4 oz)   LMP 2020   BMI 47.96 kg/m²     FHT:  present   Pelvic Exam: Performed: No     NST NOTE    Indiction :   COVID history  FHR:          Reactive, Cat 1, No decels  Contractions:    Irregular  Time Monitored:   > 20 minutes    Assessment    1. Pregnancy at 36w1d,  Fetal status reassuring  2. Last U/S 2021  3. TDAP was not already given--patient declines   4. GBS today if indicated.    Problem List Items Addressed This Visit        Gravid and     Pregnancy - Primary    Relevant Orders    POC Urinalysis Dipstick (Completed)    Group B Streptococcus Culture - Swab, Vaginal/Rectum          Plan    1. Reviewed kick counts.  NST done today  2. GBS was done  3. Reviewed routine prenatal care with the office and educational materials given  4. Follow up: 1 weeks for c/s scheduled 10/1.  5. Unforturnately not a lot we can do about the rib pain other than time.    6. Call for any problems    Luzmaria Forbes MD  2021

## 2021-09-27 LAB — BACTERIA SPEC AEROBE CULT: NORMAL

## 2021-09-28 ENCOUNTER — ROUTINE PRENATAL (OUTPATIENT)
Dept: OBSTETRICS AND GYNECOLOGY | Facility: CLINIC | Age: 31
End: 2021-09-28

## 2021-09-28 VITALS — WEIGHT: 281 LBS | BODY MASS INDEX: 48.23 KG/M2 | DIASTOLIC BLOOD PRESSURE: 82 MMHG | SYSTOLIC BLOOD PRESSURE: 124 MMHG

## 2021-09-28 DIAGNOSIS — Z98.890 S/P MYOMECTOMY: ICD-10-CM

## 2021-09-28 DIAGNOSIS — Z34.90 PREGNANCY, UNSPECIFIED GESTATIONAL AGE: Primary | ICD-10-CM

## 2021-09-28 DIAGNOSIS — O34.219 PREVIOUS CESAREAN DELIVERY AFFECTING PREGNANCY: ICD-10-CM

## 2021-09-28 DIAGNOSIS — Z86.16 HISTORY OF COVID-19: ICD-10-CM

## 2021-09-28 LAB
GLUCOSE UR STRIP-MCNC: NEGATIVE MG/DL
PROT UR STRIP-MCNC: NEGATIVE MG/DL

## 2021-09-28 PROCEDURE — 59025 FETAL NON-STRESS TEST: CPT | Performed by: OBSTETRICS & GYNECOLOGY

## 2021-09-28 PROCEDURE — 0502F SUBSEQUENT PRENATAL CARE: CPT | Performed by: OBSTETRICS & GYNECOLOGY

## 2021-09-28 NOTE — PROGRESS NOTES
OB FOLLOW UP  CC- Here for care of pregnancy        Gunjan Engel is a 31 y.o.  36w5d patient being seen today for her obstetrical follow up visit. Patient reports occasional contractions.     Her prenatal care is complicated by (and status) :    Patient Active Problem List   Diagnosis   • S/P myomectomy   • Endometriosis determined by laparoscopy   • Anxiety   • Morbid obesity with BMI of 40.0-44.9, adult (CMS/HCC)   • Family history of gene mutation   • Previous  delivery affecting pregnancy   • Pregnancy   • History of COVID-19         Flu Status: not this season  GBS Status: Was already done and it was negative.   Her Delivery Plan is: Repeat . Scheduled 10/1/21 with BTL  Ultrasound Today: No.    ROS -   Patient Reports : No Problems  Patient Denies: Vaginal Spotting  Fetal Movement : normal  All other systems reviewed and are negative.       The additional following portions of the patient's history were reviewed and updated as appropriate: allergies, current medications, past family history, past medical history, past social history, past surgical history and problem list.    I have reviewed and agree with the HPI, ROS, and historical information as entered above. Luzmaria Forbes MD        /82   Wt 127 kg (281 lb)   LMP 2020   BMI 48.23 kg/m²     EXAM:     Indication:   Recent COVID  FHT: 140 BPM   Uterine Size: size equals dates  Pelvic Exam: No    Urine glucose/protein: See prenatal flowsheet      NST NOTE    Indiction :   +Covid  FHR:          Reactive, Cat 1, No decels  Contractions:    Irregular  Time Monitored:   > 20 minutes       Assessment and Plan    Problem List Items Addressed This Visit        Gravid and     Pregnancy - Primary    Relevant Orders    POC Urinalysis Dipstick (Completed)          1. Pregnancy at 36w5d  2. Fetal status reassuring.   3. Activity and Exercise discussed.  4.  H/O myomectomy- C/S scheduled for 37 weeks.    Luzmaria ABDI  MD Leidy  09/28/2021

## 2021-10-01 ENCOUNTER — ANESTHESIA EVENT (OUTPATIENT)
Dept: LABOR AND DELIVERY | Facility: HOSPITAL | Age: 31
End: 2021-10-01

## 2021-10-01 ENCOUNTER — HOSPITAL ENCOUNTER (INPATIENT)
Facility: HOSPITAL | Age: 31
LOS: 3 days | Discharge: HOME | End: 2021-10-04
Attending: OBSTETRICS & GYNECOLOGY | Admitting: OBSTETRICS & GYNECOLOGY

## 2021-10-01 ENCOUNTER — ANESTHESIA (OUTPATIENT)
Dept: LABOR AND DELIVERY | Facility: HOSPITAL | Age: 31
End: 2021-10-01

## 2021-10-01 PROBLEM — Z34.90 PREGNANCY: Status: RESOLVED | Noted: 2021-08-01 | Resolved: 2021-10-01

## 2021-10-01 LAB
ABO GROUP BLD: NORMAL
ALP SERPL-CCNC: 165 U/L (ref 39–117)
ALT SERPL W P-5'-P-CCNC: 12 U/L (ref 1–33)
AST SERPL-CCNC: 21 U/L (ref 1–32)
ATMOSPHERIC PRESS: ABNORMAL MM[HG]
ATMOSPHERIC PRESS: ABNORMAL MM[HG]
BASE EXCESS BLDCOA CALC-SCNC: -4.7 MMOL/L (ref 0–2)
BASE EXCESS BLDCOV CALC-SCNC: -2.1 MMOL/L (ref 0–2)
BDY SITE: ABNORMAL
BDY SITE: ABNORMAL
BILIRUB SERPL-MCNC: 0.3 MG/DL (ref 0–1.2)
BLD GP AB SCN SERPL QL: NEGATIVE
BODY TEMPERATURE: 37 C
BODY TEMPERATURE: 37 C
CO2 BLDA-SCNC: 24.9 MMOL/L (ref 22–33)
CO2 BLDA-SCNC: 25.5 MMOL/L (ref 22–33)
COLLECT TME SMN: ABNORMAL
CREAT SERPL-MCNC: 0.62 MG/DL (ref 0.57–1)
DEPRECATED RDW RBC AUTO: 45.3 FL (ref 37–54)
EPAP: 0
EPAP: 0
ERYTHROCYTE [DISTWIDTH] IN BLOOD BY AUTOMATED COUNT: 13.8 % (ref 12.3–15.4)
HCO3 BLDCOA-SCNC: 23.3 MMOL/L (ref 16.9–20.5)
HCO3 BLDCOV-SCNC: 24.1 MMOL/L (ref 18.6–21.4)
HCT VFR BLD AUTO: 36.6 % (ref 34–46.6)
HGB BLD-MCNC: 12 G/DL (ref 12–15.9)
HGB BLDA-MCNC: 13.6 G/DL (ref 14–18)
HGB BLDA-MCNC: 14.7 G/DL (ref 14–18)
INHALED O2 CONCENTRATION: 21 %
INHALED O2 CONCENTRATION: 21 %
IPAP: 0
IPAP: 0
LDH SERPL-CCNC: 291 U/L (ref 135–214)
MCH RBC QN AUTO: 30.3 PG (ref 26.6–33)
MCHC RBC AUTO-ENTMCNC: 32.8 G/DL (ref 31.5–35.7)
MCV RBC AUTO: 92.4 FL (ref 79–97)
MODALITY: ABNORMAL
MODALITY: ABNORMAL
NOTE: ABNORMAL
NOTE: ABNORMAL
PAW @ PEAK INSP FLOW SETTING VENT: 0 CMH2O
PAW @ PEAK INSP FLOW SETTING VENT: 0 CMH2O
PCO2 BLDCOA: 53.4 MMHG (ref 43.3–54.9)
PCO2 BLDCOV: 45.5 MM HG (ref 28–40)
PH BLDCOA: 7.25 PH UNITS (ref 7.22–7.3)
PH BLDCOV: 7.33 PH UNITS (ref 7.31–7.37)
PLATELET # BLD AUTO: 224 10*3/MM3 (ref 140–450)
PMV BLD AUTO: 9.1 FL (ref 6–12)
PO2 BLDCOA: 18.3 MMHG (ref 11.5–43.3)
PO2 BLDCOV: 18.3 MM HG (ref 21–31)
RBC # BLD AUTO: 3.96 10*6/MM3 (ref 3.77–5.28)
RH BLD: POSITIVE
SAO2 % BLDCOA: 33.6 %
SAO2 % BLDCOA: ABNORMAL %
SAO2 % BLDCOV: 37.1 %
T&S EXPIRATION DATE: NORMAL
TOTAL RATE: 0 BREATHS/MINUTE
TOTAL RATE: 0 BREATHS/MINUTE
URATE SERPL-MCNC: 5.1 MG/DL (ref 2.4–5.7)
VENTILATOR MODE: ABNORMAL
WBC # BLD AUTO: 10.9 10*3/MM3 (ref 3.4–10.8)

## 2021-10-01 PROCEDURE — 86900 BLOOD TYPING SEROLOGIC ABO: CPT | Performed by: OBSTETRICS & GYNECOLOGY

## 2021-10-01 PROCEDURE — 83615 LACTATE (LD) (LDH) ENZYME: CPT | Performed by: OBSTETRICS & GYNECOLOGY

## 2021-10-01 PROCEDURE — S0260 H&P FOR SURGERY: HCPCS | Performed by: OBSTETRICS & GYNECOLOGY

## 2021-10-01 PROCEDURE — 85027 COMPLETE CBC AUTOMATED: CPT | Performed by: OBSTETRICS & GYNECOLOGY

## 2021-10-01 PROCEDURE — 25010000002 CEFAZOLIN PER 500 MG: Performed by: OBSTETRICS & GYNECOLOGY

## 2021-10-01 PROCEDURE — 84075 ASSAY ALKALINE PHOSPHATASE: CPT | Performed by: OBSTETRICS & GYNECOLOGY

## 2021-10-01 PROCEDURE — 25010000002 MORPHINE PER 10 MG: Performed by: NURSE ANESTHETIST, CERTIFIED REGISTERED

## 2021-10-01 PROCEDURE — 59510 CESAREAN DELIVERY: CPT | Performed by: OBSTETRICS & GYNECOLOGY

## 2021-10-01 PROCEDURE — 82247 BILIRUBIN TOTAL: CPT | Performed by: OBSTETRICS & GYNECOLOGY

## 2021-10-01 PROCEDURE — 59025 FETAL NON-STRESS TEST: CPT

## 2021-10-01 PROCEDURE — 88307 TISSUE EXAM BY PATHOLOGIST: CPT | Performed by: OBSTETRICS & GYNECOLOGY

## 2021-10-01 PROCEDURE — 86850 RBC ANTIBODY SCREEN: CPT | Performed by: OBSTETRICS & GYNECOLOGY

## 2021-10-01 PROCEDURE — C1765 ADHESION BARRIER: HCPCS | Performed by: OBSTETRICS & GYNECOLOGY

## 2021-10-01 PROCEDURE — 0UB70ZZ EXCISION OF BILATERAL FALLOPIAN TUBES, OPEN APPROACH: ICD-10-PCS | Performed by: OBSTETRICS & GYNECOLOGY

## 2021-10-01 PROCEDURE — 86901 BLOOD TYPING SEROLOGIC RH(D): CPT | Performed by: OBSTETRICS & GYNECOLOGY

## 2021-10-01 PROCEDURE — 82805 BLOOD GASES W/O2 SATURATION: CPT

## 2021-10-01 PROCEDURE — 84460 ALANINE AMINO (ALT) (SGPT): CPT | Performed by: OBSTETRICS & GYNECOLOGY

## 2021-10-01 PROCEDURE — 25010000002 KETOROLAC TROMETHAMINE PER 15 MG: Performed by: OBSTETRICS & GYNECOLOGY

## 2021-10-01 PROCEDURE — 25010000002 FENTANYL CITRATE (PF) 50 MCG/ML SOLUTION: Performed by: NURSE ANESTHETIST, CERTIFIED REGISTERED

## 2021-10-01 PROCEDURE — 58700 REMOVAL OF FALLOPIAN TUBE: CPT | Performed by: OBSTETRICS & GYNECOLOGY

## 2021-10-01 PROCEDURE — 63710000001 DIPHENHYDRAMINE PER 50 MG: Performed by: OBSTETRICS & GYNECOLOGY

## 2021-10-01 PROCEDURE — 84450 TRANSFERASE (AST) (SGOT): CPT | Performed by: OBSTETRICS & GYNECOLOGY

## 2021-10-01 PROCEDURE — 25010000002 MIDAZOLAM PER 1 MG: Performed by: NURSE ANESTHETIST, CERTIFIED REGISTERED

## 2021-10-01 PROCEDURE — 88302 TISSUE EXAM BY PATHOLOGIST: CPT | Performed by: OBSTETRICS & GYNECOLOGY

## 2021-10-01 PROCEDURE — 82565 ASSAY OF CREATININE: CPT | Performed by: OBSTETRICS & GYNECOLOGY

## 2021-10-01 PROCEDURE — 84550 ASSAY OF BLOOD/URIC ACID: CPT | Performed by: OBSTETRICS & GYNECOLOGY

## 2021-10-01 PROCEDURE — 25010000002 ONDANSETRON PER 1 MG: Performed by: NURSE ANESTHETIST, CERTIFIED REGISTERED

## 2021-10-01 DEVICE — ABSORBABLE ADHESION BARRIER
Type: IMPLANTABLE DEVICE | Status: FUNCTIONAL
Brand: GYNECARE INTERCEED

## 2021-10-01 RX ORDER — KETOROLAC TROMETHAMINE 30 MG/ML
30 INJECTION, SOLUTION INTRAMUSCULAR; INTRAVENOUS ONCE
Status: COMPLETED | OUTPATIENT
Start: 2021-10-01 | End: 2021-10-01

## 2021-10-01 RX ORDER — FENTANYL CITRATE 50 UG/ML
INJECTION, SOLUTION INTRAMUSCULAR; INTRAVENOUS AS NEEDED
Status: DISCONTINUED | OUTPATIENT
Start: 2021-10-01 | End: 2021-10-01 | Stop reason: SURG

## 2021-10-01 RX ORDER — SODIUM CHLORIDE 0.9 % (FLUSH) 0.9 %
10 SYRINGE (ML) INJECTION EVERY 12 HOURS SCHEDULED
Status: DISCONTINUED | OUTPATIENT
Start: 2021-10-01 | End: 2021-10-01 | Stop reason: HOSPADM

## 2021-10-01 RX ORDER — HYDROCORTISONE 25 MG/G
1 CREAM TOPICAL AS NEEDED
Status: DISCONTINUED | OUTPATIENT
Start: 2021-10-01 | End: 2021-10-04 | Stop reason: HOSPADM

## 2021-10-01 RX ORDER — ACETAMINOPHEN 325 MG/1
650 TABLET ORAL EVERY 6 HOURS
Status: DISCONTINUED | OUTPATIENT
Start: 2021-10-02 | End: 2021-10-04 | Stop reason: HOSPADM

## 2021-10-01 RX ORDER — FLUTICASONE PROPIONATE 50 MCG
2 SPRAY, SUSPENSION (ML) NASAL DAILY
COMMUNITY
End: 2021-10-04 | Stop reason: HOSPADM

## 2021-10-01 RX ORDER — METHYLERGONOVINE MALEATE 0.2 MG/ML
200 INJECTION INTRAVENOUS ONCE AS NEEDED
Status: DISCONTINUED | OUTPATIENT
Start: 2021-10-01 | End: 2021-10-01 | Stop reason: HOSPADM

## 2021-10-01 RX ORDER — MORPHINE SULFATE 1 MG/ML
INJECTION, SOLUTION EPIDURAL; INTRATHECAL; INTRAVENOUS AS NEEDED
Status: DISCONTINUED | OUTPATIENT
Start: 2021-10-01 | End: 2021-10-01 | Stop reason: SURG

## 2021-10-01 RX ORDER — MISOPROSTOL 200 UG/1
800 TABLET ORAL AS NEEDED
Status: DISCONTINUED | OUTPATIENT
Start: 2021-10-01 | End: 2021-10-01 | Stop reason: HOSPADM

## 2021-10-01 RX ORDER — SODIUM CHLORIDE, SODIUM LACTATE, POTASSIUM CHLORIDE, CALCIUM CHLORIDE 600; 310; 30; 20 MG/100ML; MG/100ML; MG/100ML; MG/100ML
125 INJECTION, SOLUTION INTRAVENOUS CONTINUOUS
Status: DISCONTINUED | OUTPATIENT
Start: 2021-10-01 | End: 2021-10-01

## 2021-10-01 RX ORDER — OXYTOCIN-SODIUM CHLORIDE 0.9% IV SOLN 30 UNIT/500ML 30-0.9/5 UT/ML-%
SOLUTION INTRAVENOUS CONTINUOUS PRN
Status: DISCONTINUED | OUTPATIENT
Start: 2021-10-01 | End: 2021-10-01 | Stop reason: SURG

## 2021-10-01 RX ORDER — FENTANYL CITRATE 50 UG/ML
50 INJECTION, SOLUTION INTRAMUSCULAR; INTRAVENOUS
Status: DISCONTINUED | OUTPATIENT
Start: 2021-10-01 | End: 2021-10-01 | Stop reason: HOSPADM

## 2021-10-01 RX ORDER — ACETAMINOPHEN 500 MG
1000 TABLET ORAL EVERY 6 HOURS
Status: COMPLETED | OUTPATIENT
Start: 2021-10-01 | End: 2021-10-02

## 2021-10-01 RX ORDER — ONDANSETRON 2 MG/ML
INJECTION INTRAMUSCULAR; INTRAVENOUS AS NEEDED
Status: DISCONTINUED | OUTPATIENT
Start: 2021-10-01 | End: 2021-10-01 | Stop reason: SURG

## 2021-10-01 RX ORDER — HYDROXYZINE HYDROCHLORIDE 25 MG/1
50 TABLET, FILM COATED ORAL EVERY 6 HOURS PRN
Status: DISCONTINUED | OUTPATIENT
Start: 2021-10-01 | End: 2021-10-04 | Stop reason: HOSPADM

## 2021-10-01 RX ORDER — MIDAZOLAM HYDROCHLORIDE 1 MG/ML
INJECTION INTRAMUSCULAR; INTRAVENOUS AS NEEDED
Status: DISCONTINUED | OUTPATIENT
Start: 2021-10-01 | End: 2021-10-01 | Stop reason: SURG

## 2021-10-01 RX ORDER — OXYTOCIN-SODIUM CHLORIDE 0.9% IV SOLN 30 UNIT/500ML 30-0.9/5 UT/ML-%
650 SOLUTION INTRAVENOUS ONCE
Status: DISCONTINUED | OUTPATIENT
Start: 2021-10-01 | End: 2021-10-01 | Stop reason: HOSPADM

## 2021-10-01 RX ORDER — CARBOPROST TROMETHAMINE 250 UG/ML
250 INJECTION, SOLUTION INTRAMUSCULAR AS NEEDED
Status: DISCONTINUED | OUTPATIENT
Start: 2021-10-01 | End: 2021-10-01 | Stop reason: HOSPADM

## 2021-10-01 RX ORDER — SIMETHICONE 80 MG
80 TABLET,CHEWABLE ORAL 4 TIMES DAILY PRN
Status: DISCONTINUED | OUTPATIENT
Start: 2021-10-01 | End: 2021-10-04 | Stop reason: HOSPADM

## 2021-10-01 RX ORDER — BUPIVACAINE HCL/0.9 % NACL/PF 0.125 %
PLASTIC BAG, INJECTION (ML) EPIDURAL AS NEEDED
Status: DISCONTINUED | OUTPATIENT
Start: 2021-10-01 | End: 2021-10-01 | Stop reason: SURG

## 2021-10-01 RX ORDER — PROMETHAZINE HYDROCHLORIDE 25 MG/1
25 TABLET ORAL ONCE AS NEEDED
Status: DISCONTINUED | OUTPATIENT
Start: 2021-10-01 | End: 2021-10-04 | Stop reason: HOSPADM

## 2021-10-01 RX ORDER — DIPHENHYDRAMINE HCL 25 MG
25 CAPSULE ORAL EVERY 6 HOURS PRN
Status: DISCONTINUED | OUTPATIENT
Start: 2021-10-01 | End: 2021-10-04 | Stop reason: HOSPADM

## 2021-10-01 RX ORDER — ACETAMINOPHEN 500 MG
1000 TABLET ORAL ONCE
Status: DISCONTINUED | OUTPATIENT
Start: 2021-10-01 | End: 2021-10-01 | Stop reason: SDUPTHER

## 2021-10-01 RX ORDER — ONDANSETRON 4 MG/1
4 TABLET, FILM COATED ORAL EVERY 8 HOURS PRN
Status: DISCONTINUED | OUTPATIENT
Start: 2021-10-01 | End: 2021-10-04 | Stop reason: HOSPADM

## 2021-10-01 RX ORDER — LIDOCAINE HYDROCHLORIDE 10 MG/ML
5 INJECTION, SOLUTION EPIDURAL; INFILTRATION; INTRACAUDAL; PERINEURAL AS NEEDED
Status: DISCONTINUED | OUTPATIENT
Start: 2021-10-01 | End: 2021-10-01 | Stop reason: HOSPADM

## 2021-10-01 RX ORDER — PRENATAL VIT/IRON FUM/FOLIC AC 27MG-0.8MG
1 TABLET ORAL DAILY
Status: DISCONTINUED | OUTPATIENT
Start: 2021-10-01 | End: 2021-10-04 | Stop reason: HOSPADM

## 2021-10-01 RX ORDER — METHYLERGONOVINE MALEATE 0.2 MG/ML
200 INJECTION INTRAVENOUS ONCE AS NEEDED
Status: DISCONTINUED | OUTPATIENT
Start: 2021-10-01 | End: 2021-10-04 | Stop reason: HOSPADM

## 2021-10-01 RX ORDER — DIPHENHYDRAMINE HCL 25 MG
25 CAPSULE ORAL EVERY 4 HOURS PRN
Status: DISCONTINUED | OUTPATIENT
Start: 2021-10-01 | End: 2021-10-01

## 2021-10-01 RX ORDER — CEFAZOLIN SODIUM IN 0.9 % NACL 3 G/100 ML
3 INTRAVENOUS SOLUTION, PIGGYBACK (ML) INTRAVENOUS ONCE
Status: COMPLETED | OUTPATIENT
Start: 2021-10-01 | End: 2021-10-01

## 2021-10-01 RX ORDER — IBUPROFEN 600 MG/1
600 TABLET ORAL EVERY 6 HOURS
Status: DISCONTINUED | OUTPATIENT
Start: 2021-10-03 | End: 2021-10-04 | Stop reason: HOSPADM

## 2021-10-01 RX ORDER — DOCUSATE SODIUM 100 MG/1
100 CAPSULE, LIQUID FILLED ORAL 2 TIMES DAILY PRN
Status: DISCONTINUED | OUTPATIENT
Start: 2021-10-01 | End: 2021-10-04 | Stop reason: HOSPADM

## 2021-10-01 RX ORDER — PROMETHAZINE HYDROCHLORIDE 12.5 MG/1
12.5 SUPPOSITORY RECTAL ONCE AS NEEDED
Status: DISCONTINUED | OUTPATIENT
Start: 2021-10-01 | End: 2021-10-04 | Stop reason: HOSPADM

## 2021-10-01 RX ORDER — DIPHENHYDRAMINE HYDROCHLORIDE 50 MG/ML
25 INJECTION INTRAMUSCULAR; INTRAVENOUS EVERY 4 HOURS PRN
Status: DISCONTINUED | OUTPATIENT
Start: 2021-10-01 | End: 2021-10-01

## 2021-10-01 RX ORDER — KETOROLAC TROMETHAMINE 15 MG/ML
15 INJECTION, SOLUTION INTRAMUSCULAR; INTRAVENOUS EVERY 6 HOURS
Status: COMPLETED | OUTPATIENT
Start: 2021-10-01 | End: 2021-10-03

## 2021-10-01 RX ORDER — CALCIUM CARBONATE 200(500)MG
1 TABLET,CHEWABLE ORAL EVERY 4 HOURS PRN
Status: DISCONTINUED | OUTPATIENT
Start: 2021-10-01 | End: 2021-10-04 | Stop reason: HOSPADM

## 2021-10-01 RX ORDER — TRISODIUM CITRATE DIHYDRATE AND CITRIC ACID MONOHYDRATE 500; 334 MG/5ML; MG/5ML
30 SOLUTION ORAL ONCE
Status: DISCONTINUED | OUTPATIENT
Start: 2021-10-01 | End: 2021-10-01 | Stop reason: HOSPADM

## 2021-10-01 RX ORDER — LANOLIN
CREAM (ML) TOPICAL
Status: DISCONTINUED | OUTPATIENT
Start: 2021-10-01 | End: 2021-10-04 | Stop reason: HOSPADM

## 2021-10-01 RX ORDER — ACETAMINOPHEN 500 MG
1000 TABLET ORAL ONCE
Status: COMPLETED | OUTPATIENT
Start: 2021-10-01 | End: 2021-10-01

## 2021-10-01 RX ORDER — SODIUM CHLORIDE 0.9 % (FLUSH) 0.9 %
1-10 SYRINGE (ML) INJECTION AS NEEDED
Status: DISCONTINUED | OUTPATIENT
Start: 2021-10-01 | End: 2021-10-01 | Stop reason: HOSPADM

## 2021-10-01 RX ORDER — TRISODIUM CITRATE DIHYDRATE AND CITRIC ACID MONOHYDRATE 500; 334 MG/5ML; MG/5ML
30 SOLUTION ORAL ONCE
Status: COMPLETED | OUTPATIENT
Start: 2021-10-01 | End: 2021-10-01

## 2021-10-01 RX ORDER — FLUTICASONE PROPIONATE 50 MCG
2 SPRAY, SUSPENSION (ML) NASAL DAILY
Status: DISCONTINUED | OUTPATIENT
Start: 2021-10-01 | End: 2021-10-04 | Stop reason: HOSPADM

## 2021-10-01 RX ORDER — ONDANSETRON 2 MG/ML
4 INJECTION INTRAMUSCULAR; INTRAVENOUS ONCE AS NEEDED
Status: DISCONTINUED | OUTPATIENT
Start: 2021-10-01 | End: 2021-10-01 | Stop reason: HOSPADM

## 2021-10-01 RX ORDER — HEPARIN SODIUM 5000 [USP'U]/ML
5000 INJECTION, SOLUTION INTRAVENOUS; SUBCUTANEOUS EVERY 12 HOURS SCHEDULED
Status: DISCONTINUED | OUTPATIENT
Start: 2021-10-02 | End: 2021-10-04 | Stop reason: HOSPADM

## 2021-10-01 RX ORDER — NALOXONE HCL 0.4 MG/ML
0.4 VIAL (ML) INJECTION
Status: DISCONTINUED | OUTPATIENT
Start: 2021-10-01 | End: 2021-10-01

## 2021-10-01 RX ORDER — FAMOTIDINE 10 MG/ML
INJECTION, SOLUTION INTRAVENOUS AS NEEDED
Status: DISCONTINUED | OUTPATIENT
Start: 2021-10-01 | End: 2021-10-01 | Stop reason: SURG

## 2021-10-01 RX ORDER — OXYTOCIN-SODIUM CHLORIDE 0.9% IV SOLN 30 UNIT/500ML 30-0.9/5 UT/ML-%
85 SOLUTION INTRAVENOUS ONCE
Status: DISCONTINUED | OUTPATIENT
Start: 2021-10-01 | End: 2021-10-01 | Stop reason: HOSPADM

## 2021-10-01 RX ORDER — ALUMINA, MAGNESIA, AND SIMETHICONE 2400; 2400; 240 MG/30ML; MG/30ML; MG/30ML
15 SUSPENSION ORAL EVERY 4 HOURS PRN
Status: DISCONTINUED | OUTPATIENT
Start: 2021-10-01 | End: 2021-10-04 | Stop reason: HOSPADM

## 2021-10-01 RX ORDER — BUPIVACAINE HYDROCHLORIDE 7.5 MG/ML
INJECTION, SOLUTION INTRASPINAL AS NEEDED
Status: DISCONTINUED | OUTPATIENT
Start: 2021-10-01 | End: 2021-10-01 | Stop reason: SURG

## 2021-10-01 RX ADMIN — ACETAMINOPHEN 1000 MG: 500 TABLET, FILM COATED ORAL at 09:51

## 2021-10-01 RX ADMIN — SODIUM CITRATE AND CITRIC ACID MONOHYDRATE 30 ML: 500; 334 SOLUTION ORAL at 10:39

## 2021-10-01 RX ADMIN — SIMETHICONE 80 MG: 80 TABLET, CHEWABLE ORAL at 18:00

## 2021-10-01 RX ADMIN — MORPHINE SULFATE 0.1 MG: 1 INJECTION, SOLUTION EPIDURAL; INTRATHECAL; INTRAVENOUS at 11:02

## 2021-10-01 RX ADMIN — MIDAZOLAM 1 MG: 1 INJECTION INTRAMUSCULAR; INTRAVENOUS at 10:43

## 2021-10-01 RX ADMIN — SODIUM CHLORIDE, POTASSIUM CHLORIDE, SODIUM LACTATE AND CALCIUM CHLORIDE 1000 ML: 600; 310; 30; 20 INJECTION, SOLUTION INTRAVENOUS at 08:52

## 2021-10-01 RX ADMIN — ONDANSETRON 4 MG: 2 INJECTION INTRAMUSCULAR; INTRAVENOUS at 11:10

## 2021-10-01 RX ADMIN — Medication 700 ML/HR: at 11:53

## 2021-10-01 RX ADMIN — Medication 700 ML/HR: at 11:30

## 2021-10-01 RX ADMIN — DIPHENHYDRAMINE HYDROCHLORIDE 25 MG: 25 CAPSULE ORAL at 22:30

## 2021-10-01 RX ADMIN — SODIUM CHLORIDE, POTASSIUM CHLORIDE, SODIUM LACTATE AND CALCIUM CHLORIDE: 600; 310; 30; 20 INJECTION, SOLUTION INTRAVENOUS at 11:51

## 2021-10-01 RX ADMIN — FAMOTIDINE 20 MG: 10 INJECTION, SOLUTION INTRAVENOUS at 11:10

## 2021-10-01 RX ADMIN — CEFAZOLIN 3 G: 10 INJECTION, POWDER, FOR SOLUTION INTRAVENOUS at 10:39

## 2021-10-01 RX ADMIN — BUPIVACAINE HYDROCHLORIDE IN DEXTROSE 1.5 ML: 7.5 INJECTION, SOLUTION SUBARACHNOID at 11:02

## 2021-10-01 RX ADMIN — SODIUM CHLORIDE, POTASSIUM CHLORIDE, SODIUM LACTATE AND CALCIUM CHLORIDE 125 ML/HR: 600; 310; 30; 20 INJECTION, SOLUTION INTRAVENOUS at 09:51

## 2021-10-01 RX ADMIN — MIDAZOLAM 2 MG: 1 INJECTION INTRAMUSCULAR; INTRAVENOUS at 11:34

## 2021-10-01 RX ADMIN — FLUTICASONE PROPIONATE 2 SPRAY: 50 SPRAY, METERED NASAL at 22:40

## 2021-10-01 RX ADMIN — MIDAZOLAM 2 MG: 1 INJECTION INTRAMUSCULAR; INTRAVENOUS at 11:45

## 2021-10-01 RX ADMIN — FENTANYL CITRATE 20 MCG: 50 INJECTION, SOLUTION INTRAMUSCULAR; INTRAVENOUS at 11:02

## 2021-10-01 RX ADMIN — KETOROLAC TROMETHAMINE 15 MG: 15 INJECTION, SOLUTION INTRAMUSCULAR; INTRAVENOUS at 18:15

## 2021-10-01 RX ADMIN — KETOROLAC TROMETHAMINE 30 MG: 30 INJECTION, SOLUTION INTRAMUSCULAR; INTRAVENOUS at 12:49

## 2021-10-01 RX ADMIN — SODIUM CHLORIDE, POTASSIUM CHLORIDE, SODIUM LACTATE AND CALCIUM CHLORIDE: 600; 310; 30; 20 INJECTION, SOLUTION INTRAVENOUS at 11:09

## 2021-10-01 RX ADMIN — ACETAMINOPHEN 1000 MG: 500 TABLET, FILM COATED ORAL at 22:06

## 2021-10-01 RX ADMIN — Medication 100 MCG: at 11:09

## 2021-10-01 NOTE — ANESTHESIA PROCEDURE NOTES
Spinal Block      Patient reassessed immediately prior to procedure    Indication:procedure for pain  Performed By  CRNA: Sana Sebastian CRNA  Preanesthetic Checklist  Completed: patient identified, IV checked, risks and benefits discussed, surgical consent, monitors and equipment checked, pre-op evaluation and timeout performed  Spinal Block Prep:  Patient Position:sitting  Sterile Tech:cap, gloves, mask and sterile barriers  Prep:Betadine  Patient Monitoring:blood pressure monitoring, continuous pulse oximetry and EKG  Spinal Block Procedure  Approach:midline  Guidance:palpation technique  Location:L2-L3  Needle Type:Pencan  Needle Gauge:25 G  Placement of Spinal needle event:cerebrospinal fluid aspirated  Paresthesia: no  Fluid Appearance:clear     Post Assessment  Patient Tolerance:patient tolerated the procedure well with no apparent complications  Complications no

## 2021-10-01 NOTE — ANESTHESIA POSTPROCEDURE EVALUATION
Patient: Gunjan Engel    Procedure Summary     Date: 10/01/21 Room / Location: [facility] LABOR DELIVERY   [facility]OR [facility]IV[facility]    Anesthesia Start: 104 Anesthesia Stop: 1223    Procedure:  SECTION REPEAT (N/A Abdomen) Diagnosis:     Surgeons: Luzmaria Forbes MD Provider: Perry Contreras DO    Anesthesia Type: spinal, ITN ASA Status: 3          Anesthesia Type: spinal, ITN    Vitals  Vitals Value Taken Time   /63 10/01/21 1221   Temp 97.5    Pulse 82 10/01/21 1222   Resp 18    SpO2 98 % 10/01/21 1222   Vitals shown include unvalidated device data.        Post Anesthesia Care and Evaluation    Patient location during evaluation: bedside  Patient participation: complete - patient participated  Level of consciousness: awake and alert  Pain score: 0  Pain management: adequate  Airway patency: patent  Anesthetic complications: No anesthetic complications    Cardiovascular status: acceptable  Respiratory status: acceptable  Hydration status: acceptable

## 2021-10-01 NOTE — PLAN OF CARE
Problem: Adult Inpatient Plan of Care  Goal: Plan of Care Review  Outcome: Ongoing, Progressing     Problem: Bleeding ( Delivery)  Goal: Bleeding is Controlled  Outcome: Met  Intervention: Monitor and Manage Intrapartum Bleeding     Problem: Change in Fetal Wellbeing ( Delivery)  Goal: Stable Fetal Wellbeing  Outcome: Met  Intervention: Promote and Monitor Fetal Wellbeing     Problem: Infection ( Delivery)  Goal: Absence of Infection Signs and Symptoms  Outcome: Met  Intervention: Minimize Infection Risk     Problem: Respiratory Compromise ( Delivery)  Goal: Effective Oxygenation and Ventilation  Outcome: Met   Goal Outcome Evaluation:  Plan of Care Reviewed With: patient, spouse        Progress: improving

## 2021-10-01 NOTE — LACTATION NOTE
Per Mom, she plans to pump and bottle feed only, for now.  She was provided a home Spectra pump via her insurance, and was shown how to use it.  She was advised to pump every 3 hours.  She said she had a good milk supply with her first child.     10/01/21 1530   Maternal Assessment   Breast Size Issue none   Breast Shape Bilateral:;round   Breast Density Bilateral:;soft   Nipples Bilateral:;everted   Left Nipple Symptoms intact   Right Nipple Symptoms intact   Maternal Infant Feeding   Maternal Emotional State receptive;relaxed   Milk Expression/Equipment   Breast Pump Type double electric, personal

## 2021-10-01 NOTE — H&P
"History and Physical  [facility]    Chief Complaint   Patient presents with   • Scheduled        Patient Active Problem List   Diagnosis   • S/P myomectomy   • Endometriosis determined by laparoscopy   • Anxiety   • Morbid obesity with BMI of 40.0-44.9, adult (HCC)   • Family history of gene mutation   • Previous  delivery affecting pregnancy   • Pregnancy   • History of COVID-19       Gunjan Engel is a 31 y.o. year old  with an Estimated Date of Delivery: 10/21/21 currently at 37w1d presenting with no complaints.    Prenatal care has been with Dr. Forbes.  It has been significant for previous myomectomy, previous c/s and previous child with known microdeletion.    No Additional Complaints Reported    The following portions of the patient's history were reviewed and updated as appropriate:vital signs, allergies, current medications, past medical history, past social history, past surgical history and problem list.    Review of Systems  Pertinent items are noted in HPI.     Objective     /80 (BP Location: Left arm, Patient Position: Lying)   Pulse 97   Temp 98.6 °F (37 °C) (Oral)   Resp 16   Ht 162.6 cm (64\")   Wt 127 kg (281 lb)   LMP 2020   SpO2 100%   Breastfeeding Yes   BMI 48.23 kg/m²     Physical Exam    General:  well developed; well nourished  no acute distress           Abdomen: soft, non-tender; no masses       FHT's: reactive and category 1   Cervix:    [place]: Contraction are irregular     Lab Review   Labs: No data reviewed   Lab Results (last 24 hours)     Procedure Component Value Units Date/Time    Preeclampsia Panel [[id number]]  (Abnormal) Collected: 10/01/21 0856    Specimen: Blood Updated: 10/01/21 0942     Alkaline Phosphatase 165 U/L      ALT (SGPT) 12 U/L      AST (SGOT) 21 U/L      Comment: Specimen hemolyzed.  Results may be affected.        Creatinine 0.62 mg/dL      Total Bilirubin 0.3 mg/dL       U/L      Comment: " Specimen hemolyzed.  Results may be affected.        Uric Acid 5.1 mg/dL     CBC (No Diff) [[id number]]  (Abnormal) Collected: 10/01/21 0856    Specimen: Blood Updated: 10/01/21 0911     WBC 10.90 10*3/mm3      RBC 3.96 10*6/mm3      Hemoglobin 12.0 g/dL      Hematocrit 36.6 %      MCV 92.4 fL      MCH 30.3 pg      MCHC 32.8 g/dL      RDW 13.8 %      RDW-SD 45.3 fl      MPV 9.1 fL      Platelets 224 10*3/mm3           Imaging   No data reviewed   Imaging Results (Most Recent)     None        Assessment/Plan     ASSESSMENT  1. IUP at 37w1d  2. Previous  section  3. Previous myomectomy  4. Recent Covid  5. Desires sterilization  PLAN  1. Admit for repeat  section and permanent surgical sterilization.         Luzmaria Forbes MD  10/1/761480:30 EDT

## 2021-10-01 NOTE — ANESTHESIA PREPROCEDURE EVALUATION
Anesthesia Evaluation     Patient summary reviewed and Nursing notes reviewed   NPO Solid Status: > 8 hours  NPO Liquid Status: > 8 hours           Airway   Dental      Pulmonary - negative pulmonary ROS   Cardiovascular - negative cardio ROS        Neuro/Psych  (+) headaches (Migraines), psychiatric history Anxiety and Depression,     GI/Hepatic/Renal/Endo    (+) obesity, morbid obesity,      Musculoskeletal (-) negative ROS    Abdominal    Substance History - negative use     OB/GYN    (+) Pregnant,         Other                        Anesthesia Plan    ASA 3     spinal and ITN       Anesthetic plan, all risks, benefits, and alternatives have been provided, discussed and informed consent has been obtained with: patient.

## 2021-10-01 NOTE — OP NOTE
Section Procedure Note    Indications: Previous  section    Previous myomectomy    Pre-operative Diagnosis: 1: 37w1d              2.  Desires sterilization  Patient Active Problem List   Diagnosis   • S/P myomectomy   • Endometriosis determined by laparoscopy   • Anxiety   • Morbid obesity with BMI of 40.0-44.9, adult (HCC)   • Family history of gene mutation   • Previous  delivery affecting pregnancy   • Pregnancy   • History of COVID-19                Post-operative Diagnosis: 1:  Same.      Procedures:  Procedure(s):   SECTION REPEATLTUI  Bilateral salpingectomy    Surgeon:  Luzmaria Forbes MD    Assistant:  Assistant: Genet Velazquez MD    Anesthesia:  Spinal    Estimated Blood Loss:  800 cc    Infant:            Gender: male  infant    Weight: 2850 g (6 lb 4.5 oz)     Apgars: 8  @ 1 minute /     9  @ 5 minutes               Findings:       The infant was delivered from the Presentation/Position: Vertex ;        The amnionic fluid was Clear     Drains:  Pineda catheter to straight drainage.                    Antibiotics:  Cefazolin           Complications:  None; patient tolerated the procedure well.           Disposition: PACU - hemodynamically stable.           Condition: stable    Procedure Details   The patient was seen in the Holding Room. The risks, benefits, complications, treatment options, and expected outcomes were discussed with the patient.  The patient concurred with the proposed plan, giving informed consent.  The patient was taken to the Operating Room, identified as Gunjan Engel and the procedure verified as  Delivery. A Time Out was held and the above information confirmed.    After an adequate level of anesthesia was obtained, the patient was draped and prepped in the usual sterile manner. A Pfannenstiel incision was made and carried down through the subcutaneous tissue to the fascia. Fascial incision was made and extended transversely with the  Stephen scissors. The fascia was  bluntly and sharply from the underlying rectus tissue superiorly and inferiorly. The rectus muscles were divided sharply. The peritoneum was identified and entered. Peritoneal incision was extended longitudinally taking care not to injure the bladder and bowel.  The bladder flap was sharply and bluntly developed  A low transverse uterine incision was made with the scalpel.  Delivered from  Vertex .  After the umbilical cord was milked, clamped and cut, cord blood was obtained for evaluation. The placenta was expressed intact and appeared normal.  The uterus was everted from the abdomen and curetted with a moist lap sponge x 2.    The uterine outline, tubes and ovaries appeared normal. The uterine incision was closed in two layers with a running continuous locking suture of #1 chromic. Hemostasis was obtained.  Each tube was then identified and the Endoseal used to transect the mesosalpinx and remove the tube in its entirety.    Irrigation was performed and counts were correct;  The uterus was replaced into the abdomen and the lateral gutters were irrigated.  There was evidence of a previous round ligament suspension.  The one on the right created a potential space for herniation and was cut.   The incision was reinspected and noted to be hemostatic.  Intercede was placed over the hysterotomy.  The peritoneum was closed with a running continuous suture of 2-0 Vicryl;  The rectus muscles reapproximated with interrupted sutures of 2-0 Vicryl. The fascia was then reapproximated with running sutures of 0 Vicryl. The subcutaneous layer was irrigated, noted to be hemostatic, and closed with 3-0 plain Gut after a 15 round drain was placed.  The skin was reapproximated with staples.    Instrument, sponge, and needle counts were correct prior the abdominal closure and at the conclusion of the case. The patient went to the Recovery Room in stable condition with the darnell draining clear  urine.       Luzmaria Forbes MD      10/1/2021  12:22 EDT

## 2021-10-02 LAB
BASOPHILS # BLD AUTO: 0.06 10*3/MM3 (ref 0–0.2)
BASOPHILS NFR BLD AUTO: 0.4 % (ref 0–1.5)
DEPRECATED RDW RBC AUTO: 46.1 FL (ref 37–54)
EOSINOPHIL # BLD AUTO: 0.07 10*3/MM3 (ref 0–0.4)
EOSINOPHIL NFR BLD AUTO: 0.5 % (ref 0.3–6.2)
ERYTHROCYTE [DISTWIDTH] IN BLOOD BY AUTOMATED COUNT: 14 % (ref 12.3–15.4)
HCT VFR BLD AUTO: 30.3 % (ref 34–46.6)
HGB BLD-MCNC: 10.2 G/DL (ref 12–15.9)
IMM GRANULOCYTES # BLD AUTO: 0.06 10*3/MM3 (ref 0–0.05)
IMM GRANULOCYTES NFR BLD AUTO: 0.4 % (ref 0–0.5)
LYMPHOCYTES # BLD AUTO: 1.69 10*3/MM3 (ref 0.7–3.1)
LYMPHOCYTES NFR BLD AUTO: 11.8 % (ref 19.6–45.3)
MCH RBC QN AUTO: 31 PG (ref 26.6–33)
MCHC RBC AUTO-ENTMCNC: 33.7 G/DL (ref 31.5–35.7)
MCV RBC AUTO: 92.1 FL (ref 79–97)
MONOCYTES # BLD AUTO: 0.62 10*3/MM3 (ref 0.1–0.9)
MONOCYTES NFR BLD AUTO: 4.3 % (ref 5–12)
NEUTROPHILS NFR BLD AUTO: 11.87 10*3/MM3 (ref 1.7–7)
NEUTROPHILS NFR BLD AUTO: 82.6 % (ref 42.7–76)
NRBC BLD AUTO-RTO: 0 /100 WBC (ref 0–0.2)
PLATELET # BLD AUTO: 228 10*3/MM3 (ref 140–450)
PMV BLD AUTO: 9.6 FL (ref 6–12)
RBC # BLD AUTO: 3.29 10*6/MM3 (ref 3.77–5.28)
WBC # BLD AUTO: 14.37 10*3/MM3 (ref 3.4–10.8)

## 2021-10-02 PROCEDURE — 25010000002 KETOROLAC TROMETHAMINE PER 15 MG: Performed by: OBSTETRICS & GYNECOLOGY

## 2021-10-02 PROCEDURE — 85025 COMPLETE CBC W/AUTO DIFF WBC: CPT | Performed by: OBSTETRICS & GYNECOLOGY

## 2021-10-02 PROCEDURE — 25010000002 HEPARIN (PORCINE) PER 1000 UNITS: Performed by: OBSTETRICS & GYNECOLOGY

## 2021-10-02 PROCEDURE — 94640 AIRWAY INHALATION TREATMENT: CPT

## 2021-10-02 PROCEDURE — 0503F POSTPARTUM CARE VISIT: CPT | Performed by: NURSE PRACTITIONER

## 2021-10-02 RX ORDER — ALBUTEROL SULFATE 2.5 MG/3ML
2.5 SOLUTION RESPIRATORY (INHALATION) EVERY 6 HOURS PRN
Status: DISCONTINUED | OUTPATIENT
Start: 2021-10-02 | End: 2021-10-04 | Stop reason: HOSPADM

## 2021-10-02 RX ORDER — IBUPROFEN 600 MG/1
600 TABLET ORAL EVERY 6 HOURS
Status: CANCELLED | OUTPATIENT
Start: 2021-10-04

## 2021-10-02 RX ORDER — KETOROLAC TROMETHAMINE 15 MG/ML
15 INJECTION, SOLUTION INTRAMUSCULAR; INTRAVENOUS EVERY 6 HOURS
Status: CANCELLED | OUTPATIENT
Start: 2021-10-02 | End: 2021-10-03

## 2021-10-02 RX ADMIN — SERTRALINE HYDROCHLORIDE 50 MG: 50 TABLET ORAL at 09:40

## 2021-10-02 RX ADMIN — SIMETHICONE 80 MG: 80 TABLET, CHEWABLE ORAL at 09:39

## 2021-10-02 RX ADMIN — KETOROLAC TROMETHAMINE 15 MG: 15 INJECTION, SOLUTION INTRAMUSCULAR; INTRAVENOUS at 12:54

## 2021-10-02 RX ADMIN — SIMETHICONE 80 MG: 80 TABLET, CHEWABLE ORAL at 16:06

## 2021-10-02 RX ADMIN — KETOROLAC TROMETHAMINE 15 MG: 15 INJECTION, SOLUTION INTRAMUSCULAR; INTRAVENOUS at 06:12

## 2021-10-02 RX ADMIN — ACETAMINOPHEN 1000 MG: 500 TABLET, FILM COATED ORAL at 16:06

## 2021-10-02 RX ADMIN — KETOROLAC TROMETHAMINE 15 MG: 15 INJECTION, SOLUTION INTRAMUSCULAR; INTRAVENOUS at 18:18

## 2021-10-02 RX ADMIN — ACETAMINOPHEN 1000 MG: 500 TABLET, FILM COATED ORAL at 09:39

## 2021-10-02 RX ADMIN — ALBUTEROL SULFATE 2.5 MG: 2.5 SOLUTION RESPIRATORY (INHALATION) at 22:50

## 2021-10-02 RX ADMIN — SIMETHICONE 80 MG: 80 TABLET, CHEWABLE ORAL at 12:55

## 2021-10-02 RX ADMIN — KETOROLAC TROMETHAMINE 15 MG: 15 INJECTION, SOLUTION INTRAMUSCULAR; INTRAVENOUS at 00:52

## 2021-10-02 RX ADMIN — HEPARIN SODIUM 5000 UNITS: 5000 INJECTION, SOLUTION INTRAVENOUS; SUBCUTANEOUS at 20:40

## 2021-10-02 RX ADMIN — ACETAMINOPHEN 650 MG: 325 TABLET, FILM COATED ORAL at 22:38

## 2021-10-02 RX ADMIN — DOCUSATE SODIUM 100 MG: 100 CAPSULE, LIQUID FILLED ORAL at 09:40

## 2021-10-02 RX ADMIN — ACETAMINOPHEN 1000 MG: 500 TABLET, FILM COATED ORAL at 04:51

## 2021-10-02 RX ADMIN — DOCUSATE SODIUM 100 MG: 100 CAPSULE, LIQUID FILLED ORAL at 20:40

## 2021-10-02 RX ADMIN — PRENATAL VITAMINS-IRON FUMARATE 27 MG IRON-FOLIC ACID 0.8 MG TABLET 1 TABLET: at 09:40

## 2021-10-02 RX ADMIN — HEPARIN SODIUM 5000 UNITS: 5000 INJECTION, SOLUTION INTRAVENOUS; SUBCUTANEOUS at 09:39

## 2021-10-02 NOTE — PROGRESS NOTES
10/2/2021    Name:Gunjan Engel    MR#:[id number]     PROGRESS NOTE:  Post-Op 1 S/P    HD:1    Subjective   31 y.o. yo Female  s/p CS at 37w1d doing well. Pain well controlled on Toradol.  However, she reports a severe allergy to oxycodone. Tolerating regular diet and having flatus. Lochia normal.  She requests to restart Zoloft.  She requests refill albuterol inhaler as hers is almost empty.      Patient Active Problem List   Diagnosis   • S/P myomectomy   • Endometriosis determined by laparoscopy   • Anxiety   • Morbid obesity with BMI of 40.0-44.9, adult (HCC)   • Family history of gene mutation   • Previous  delivery affecting pregnancy   • History of COVID-19        Objective    Vitals  Temp:  Temp:  [97.5 °F (36.4 °C)-98 °F (36.7 °C)] 98 °F (36.7 °C)  Temp src: Oral  BP:  BP: ()/() 99/54  Pulse:  Heart Rate:  [63-98] 98  RR:   Resp:  [16] 16    General Awake, alert, no distress  Abdomen Soft, non-distended, fundus firm, below umbilicus, appropriately tender  Incision  Intact, no erythema or exudate  Extremities Calves NT bilaterally     I/O last 3 completed shifts:  In: 1600 [I.V.:1600]  Out: 2230 [Urine:1950; Drains:50; Blood:230]    LABS:   Lab Results   Component Value Date    WBC 14.37 (H) 10/02/2021    HGB 10.2 (L) 10/02/2021    HCT 30.3 (L) 10/02/2021    MCV 92.1 10/02/2021     10/02/2021       Infant: male       Assessment   1.  POD 1, doing well   2.  Hx asthma-- inhaler empty   3.  Baby boy well; desires circ   4.  Severe allergy to oxycodone   5.  Hx depression/anxiety--- no meds while pregnant    Plan:  Routine postoperative care.  Rx albuterol. Rx Zoloft. Continue IV Toradol x another 24 hours. Plan circ.      Active Problems:   None      Marissa Hernandez, DIMITRI  10/2/2021 10:30 EDT

## 2021-10-03 PROCEDURE — 94799 UNLISTED PULMONARY SVC/PX: CPT

## 2021-10-03 PROCEDURE — 25010000002 KETOROLAC TROMETHAMINE PER 15 MG: Performed by: OBSTETRICS & GYNECOLOGY

## 2021-10-03 PROCEDURE — 25010000002 HEPARIN (PORCINE) PER 1000 UNITS: Performed by: OBSTETRICS & GYNECOLOGY

## 2021-10-03 RX ADMIN — DOCUSATE SODIUM 100 MG: 100 CAPSULE, LIQUID FILLED ORAL at 19:48

## 2021-10-03 RX ADMIN — ALBUTEROL SULFATE 2.5 MG: 2.5 SOLUTION RESPIRATORY (INHALATION) at 13:34

## 2021-10-03 RX ADMIN — ACETAMINOPHEN 650 MG: 325 TABLET, FILM COATED ORAL at 10:33

## 2021-10-03 RX ADMIN — ACETAMINOPHEN 650 MG: 325 TABLET, FILM COATED ORAL at 22:15

## 2021-10-03 RX ADMIN — DOCUSATE SODIUM 100 MG: 100 CAPSULE, LIQUID FILLED ORAL at 08:23

## 2021-10-03 RX ADMIN — SIMETHICONE 80 MG: 80 TABLET, CHEWABLE ORAL at 19:48

## 2021-10-03 RX ADMIN — HEPARIN SODIUM 5000 UNITS: 5000 INJECTION, SOLUTION INTRAVENOUS; SUBCUTANEOUS at 08:23

## 2021-10-03 RX ADMIN — FLUTICASONE PROPIONATE 2 SPRAY: 50 SPRAY, METERED NASAL at 08:24

## 2021-10-03 RX ADMIN — ACETAMINOPHEN 650 MG: 325 TABLET, FILM COATED ORAL at 15:45

## 2021-10-03 RX ADMIN — ACETAMINOPHEN 650 MG: 325 TABLET, FILM COATED ORAL at 04:04

## 2021-10-03 RX ADMIN — KETOROLAC TROMETHAMINE 15 MG: 15 INJECTION, SOLUTION INTRAMUSCULAR; INTRAVENOUS at 00:36

## 2021-10-03 RX ADMIN — IBUPROFEN 600 MG: 600 TABLET ORAL at 19:22

## 2021-10-03 RX ADMIN — SERTRALINE HYDROCHLORIDE 50 MG: 50 TABLET ORAL at 08:23

## 2021-10-03 RX ADMIN — ALBUTEROL SULFATE 2.5 MG: 2.5 SOLUTION RESPIRATORY (INHALATION) at 17:53

## 2021-10-03 RX ADMIN — PRENATAL VITAMINS-IRON FUMARATE 27 MG IRON-FOLIC ACID 0.8 MG TABLET 1 TABLET: at 08:23

## 2021-10-03 RX ADMIN — HEPARIN SODIUM 5000 UNITS: 5000 INJECTION, SOLUTION INTRAVENOUS; SUBCUTANEOUS at 19:48

## 2021-10-03 RX ADMIN — KETOROLAC TROMETHAMINE 15 MG: 15 INJECTION, SOLUTION INTRAMUSCULAR; INTRAVENOUS at 06:19

## 2021-10-03 RX ADMIN — KETOROLAC TROMETHAMINE 15 MG: 15 INJECTION, SOLUTION INTRAMUSCULAR; INTRAVENOUS at 13:18

## 2021-10-03 RX ADMIN — ALBUTEROL SULFATE 2.5 MG: 2.5 SOLUTION RESPIRATORY (INHALATION) at 08:07

## 2021-10-03 NOTE — PROGRESS NOTES
Postpartum C/S Progress Note    Patient name: Gunjan Engel  YOB: 1990   MRN: [id number]  Referring Provider: Luzmaria Forbes MD  Admission Date: 10/1/2021  Date of Service: 10/3/2021    Delivering MD:    Luzmaria Forbes     ID: 31 y.o.     Diagnosis:   S/p  delivery 2 Days Post-Op   Patient Active Problem List   Diagnosis   • S/P myomectomy   • Endometriosis determined by laparoscopy   • Anxiety   • Morbid obesity with BMI of 40.0-44.9, adult (HCC)   • Family history of gene mutation   • Previous  delivery affecting pregnancy   • History of COVID-19       Subjective:      No complaints.  Moderate lochia.  Ambulating, voiding, tolerating diet.  Pain well controlled.  The patient is currently breastfeeding.   This baby is a boy    Objective:      Vital signs:  Vital Signs Range for the last 24 hours  Temperature: Temp:  [97.7 °F (36.5 °C)-98.7 °F (37.1 °C)] 97.7 °F (36.5 °C)   Temp Source: Temp src: Oral   BP: BP: (113-130)/(59-80) 130/80   Pulse: Heart Rate:  [] 100   Respirations: Resp:  [16-18] 16   Weight: 127 kg (281 lb)     General: Alert & oriented x4, in no apparent distress  Abdomen: soft, nontender  Uterus: firm, nontender  Incision: clean, dry, intact, dressing clean, staples  Extremities: nontender; no edema      Labs:  Lab Results   Component Value Date    WBC 14.37 (H) 10/02/2021    HGB 10.2 (L) 10/02/2021    HCT 30.3 (L) 10/02/2021    MCV 92.1 10/02/2021     10/02/2021     Results from last 7 days   Lab Units 10/01/21  0856   ABO TYPING  O   RH TYPING  Positive     External Prenatal Results     Pregnancy Outside Results - Transcribed From Office Records - See Scanned Records For Details     Test Value Date Time    ABO  O  10/01/21 0856    Rh  Positive  10/01/21 0856    Antibody Screen  Negative  10/01/21 0856       Negative  21 1127       Negative  21 1121    Varicella IgG       Rubella  4.28 index 21 1121    Hgb  10.2 g/dL  10/02/21 0855       12.0 g/dL 10/01/21 0856       12.1 g/dL 21 1127       12.1 g/dL 21 1121    Hct  30.3 % 10/02/21 0855       36.6 % 10/01/21 0856       36.5 % 21 1127       36.8 % 21 1121    Glucose Fasting GTT       Glucose Tolerance Test 1 hour ^ 102  21     Glucose Tolerance Test 3 hour       Gonorrhea (discrete) ^ NEG  16     Chlamydia (discrete) ^ NEG  16     RPR  Non Reactive  21 1121    VDRL       Syphilis Antibody       HBsAg  Negative  21 1121    Herpes Simplex Virus PCR       Herpes Simplex VIrus Culture       HIV  Non Reactive  21 1121    Hep C RNA Quant PCR       Hep C Antibody  <0.1 s/co ratio 21 1121    AFP  50.7 ng/mL 21     Group B Strep  No Group B Streptococcus isolated  21 1757    GBS Susceptibility to Clindamycin       GBS Susceptibility to Erythromycin       Fetal Fibronectin       Genetic Testing, Maternal Blood             Drug Screening     Test Value Date Time    Urine Drug Screen       Amphetamine Screen  Negative ng/mL 21 1121    Barbiturate Screen  Negative ng/mL 21 1121    Benzodiazepine Screen  Negative ng/mL 21 1121    Methadone Screen  Negative ng/mL 21 1121    Phencyclidine Screen  Negative ng/mL 21 1121    Opiates Screen       THC Screen       Cocaine Screen       Propoxyphene Screen  Negative ng/mL 21 1121    Buprenorphine Screen       Methamphetamine Screen       Oxycodone Screen       Tricyclic Antidepressants Screen             Legend    ^: Historical                        Assessment/Plan:      2 Days Post-Op s/p Procedure(s):   SECTION REPEAT    1. S/p  delivery: Continue postoperative care.  Doing well.  Hemodynamically stable.  2. Infant feeding: Supportive care.  The patient is currently breastfeeding.  3.  Recent Covid.  4.  Heparin for DVT prophylaxis for COVID and obesity.  5.  Incisional drain will be removed at D/C home.    Luzmaria Forbes  MD

## 2021-10-04 VITALS
TEMPERATURE: 97.7 F | DIASTOLIC BLOOD PRESSURE: 89 MMHG | RESPIRATION RATE: 16 BRPM | BODY MASS INDEX: 47.97 KG/M2 | OXYGEN SATURATION: 100 % | HEART RATE: 89 BPM | WEIGHT: 281 LBS | SYSTOLIC BLOOD PRESSURE: 130 MMHG | HEIGHT: 64 IN

## 2021-10-04 DIAGNOSIS — J20.9 ACUTE BRONCHITIS, UNSPECIFIED ORGANISM: ICD-10-CM

## 2021-10-04 PROCEDURE — 25010000002 HEPARIN (PORCINE) PER 1000 UNITS: Performed by: OBSTETRICS & GYNECOLOGY

## 2021-10-04 PROCEDURE — 0503F POSTPARTUM CARE VISIT: CPT | Performed by: NURSE PRACTITIONER

## 2021-10-04 PROCEDURE — 94799 UNLISTED PULMONARY SVC/PX: CPT

## 2021-10-04 RX ORDER — ALBUTEROL SULFATE 2.5 MG/3ML
2.5 SOLUTION RESPIRATORY (INHALATION) EVERY 6 HOURS PRN
Status: DISCONTINUED | OUTPATIENT
Start: 2021-10-04 | End: 2021-10-04 | Stop reason: HOSPADM

## 2021-10-04 RX ORDER — ALBUTEROL SULFATE 90 UG/1
AEROSOL, METERED RESPIRATORY (INHALATION)
Qty: 54 G | Refills: 0 | Status: SHIPPED | OUTPATIENT
Start: 2021-10-04 | End: 2022-10-05

## 2021-10-04 RX ORDER — IBUPROFEN 600 MG/1
600 TABLET ORAL EVERY 6 HOURS
Qty: 30 TABLET | Refills: 0 | Status: SHIPPED | OUTPATIENT
Start: 2021-10-04 | End: 2022-10-05

## 2021-10-04 RX ADMIN — IBUPROFEN 600 MG: 600 TABLET ORAL at 06:00

## 2021-10-04 RX ADMIN — ACETAMINOPHEN 650 MG: 325 TABLET, FILM COATED ORAL at 03:30

## 2021-10-04 RX ADMIN — IBUPROFEN 600 MG: 600 TABLET ORAL at 00:50

## 2021-10-04 RX ADMIN — HEPARIN SODIUM 5000 UNITS: 5000 INJECTION, SOLUTION INTRAVENOUS; SUBCUTANEOUS at 08:10

## 2021-10-04 RX ADMIN — FLUTICASONE PROPIONATE 2 SPRAY: 50 SPRAY, METERED NASAL at 08:18

## 2021-10-04 RX ADMIN — DOCUSATE SODIUM 100 MG: 100 CAPSULE, LIQUID FILLED ORAL at 08:10

## 2021-10-04 RX ADMIN — ALBUTEROL SULFATE 2.5 MG: 2.5 SOLUTION RESPIRATORY (INHALATION) at 00:59

## 2021-10-04 RX ADMIN — PRENATAL VITAMINS-IRON FUMARATE 27 MG IRON-FOLIC ACID 0.8 MG TABLET 1 TABLET: at 08:10

## 2021-10-04 RX ADMIN — ACETAMINOPHEN 650 MG: 325 TABLET, FILM COATED ORAL at 11:44

## 2021-10-04 RX ADMIN — SERTRALINE HYDROCHLORIDE 50 MG: 50 TABLET ORAL at 08:10

## 2021-10-04 NOTE — PLAN OF CARE
Problem: Adult Inpatient Plan of Care  Goal: Plan of Care Review  Outcome: Met  Flowsheets  Taken 10/4/2021 1251 by Chloe Rich RN  Progress: improving  Taken 10/1/2021 1238 by Deann Cruz RN  Plan of Care Reviewed With:   patient   spouse  Goal: Patient-Specific Goal (Individualized)  Outcome: Met  Goal: Absence of Hospital-Acquired Illness or Injury  Outcome: Met  Intervention: Identify and Manage Fall Risk  Recent Flowsheet Documentation  Taken 10/4/2021 1144 by Chloe Rich RN  Safety Promotion/Fall Prevention: safety round/check completed  Taken 10/4/2021 1100 by Chloe Rich RN  Safety Promotion/Fall Prevention: safety round/check completed  Taken 10/4/2021 1000 by Chloe Rich RN  Safety Promotion/Fall Prevention: safety round/check completed  Taken 10/4/2021 0800 by Chloe Rich RN  Safety Promotion/Fall Prevention: safety round/check completed  Intervention: Prevent Infection  Recent Flowsheet Documentation  Taken 10/4/2021 0800 by Chloe Rich RN  Infection Prevention:   personal protective equipment utilized   rest/sleep promoted   single patient room provided   visitors restricted/screened  Goal: Optimal Comfort and Wellbeing  Outcome: Met  Intervention: Provide Person-Centered Care  Recent Flowsheet Documentation  Taken 10/4/2021 0800 by Chloe Rich RN  Trust Relationship/Rapport:   care explained   choices provided  Goal: Readiness for Transition of Care  Outcome: Met     Problem: Breastfeeding  Goal: Effective Breastfeeding  Outcome: Met  Intervention: Promote Effective Breastfeeding  Recent Flowsheet Documentation  Taken 10/4/2021 0800 by Chloe Rich RN  Parent/Child Attachment Promotion:   positive reinforcement provided   rooming-in promoted   skin-to-skin contact encouraged   strengths emphasized  Intervention: Support Exclusive Breastfeeding Success  Recent Flowsheet Documentation  Taken 10/4/2021 0800 by Chloe Rich RN  Supportive Measures:  active listening utilized     Problem: Adjustment to Role Transition (Postpartum  Delivery)  Goal: Successful Maternal Role Transition  Outcome: Met  Intervention: Support Maternal Role Transition  Recent Flowsheet Documentation  Taken 10/4/2021 0800 by Chloe Rich RN  Supportive Measures: active listening utilized  Parent/Child Attachment Promotion:   positive reinforcement provided   rooming-in promoted   skin-to-skin contact encouraged   strengths emphasized     Problem: Bleeding (Postpartum  Delivery)  Goal: Hemostasis  Outcome: Met     Problem: Infection (Postpartum  Delivery)  Goal: Absence of Infection Signs and Symptoms  Outcome: Met     Problem: Pain (Postpartum  Delivery)  Goal: Acceptable Pain Control  Outcome: Met  Intervention: Prevent or Manage Pain  Recent Flowsheet Documentation  Taken 10/4/2021 1144 by Chloe Rich, RN  Pain Management Interventions:   around-the-clock dosing utilized   see MAR     Problem: Postoperative Nausea and Vomiting (Postpartum  Delivery)  Goal: Nausea and Vomiting Relief  Outcome: Met     Problem: Postoperative Urinary Retention (Postpartum  Delivery)  Goal: Effective Urinary Elimination  Outcome: Met   Goal Outcome Evaluation:           Progress: improving

## 2021-10-04 NOTE — DISCHARGE SUMMARY
Discharge Summary    Date of Admission: 10/1/2021  Date of Discharge:  10/4/2021      Patient: Gunjan Engel      MR#:[id number]    Primary Surgeon/OB: Luzmaria Forbes MD    Discharge Surgeon/OB:    Presenting Problem/History of Present Illness  Previous  delivery affecting pregnancy [O34.219]     Patient Active Problem List   Diagnosis   • S/P myomectomy   • Endometriosis determined by laparoscopy   • Anxiety   • Morbid obesity with BMI of 40.0-44.9, adult (HCC)   • Family history of gene mutation   • Previous  delivery affecting pregnancy   • History of COVID-19         Discharge Diagnosis:  section at 37w1d    Procedures:  , Low Transverse     10/1/2021    11:29 AM        Rh Immune globulin given: no    Rubella vaccine given: no    Discharge Date: 10/4/2021; Discharge Time: 09:54 EDT    Early Discharge:  NO    Hospital Course  Patient is a 31 y.o. female  at 37w1d status post  section with uneventful postoperative recovery.  Patient was advanced to regular diet on postoperative day#1.  On discharge, ambulating, tolerating a regular diet without any difficulties and her incision is dry, clean and intact.     Infant:   male  fetus 2850 g (6 lb 4.5 oz)  with Apgar scores of 8 , 9  at five minutes.    Condition on Discharge:  Stable    Vital Signs  Temp:  [97.7 °F (36.5 °C)-98.8 °F (37.1 °C)] 97.7 °F (36.5 °C)  Heart Rate:  [] 89  Resp:  [16-20] 16  BP: (130-136)/(67-89) 130/89    Lab Results   Component Value Date    WBC 14.37 (H) 10/02/2021    HGB 10.2 (L) 10/02/2021    HCT 30.3 (L) 10/02/2021    MCV 92.1 10/02/2021     10/02/2021       Discharge Disposition  Home or Self Care    Discharge Medications     Discharge Medications      New Medications      Instructions Start Date   ibuprofen 600 MG tablet  Commonly known as: ADVIL,MOTRIN   600 mg, Oral, Every 6 Hours         Continue These Medications      Instructions Start Date   albuterol sulfate HFA  108 (90 Base) MCG/ACT inhaler  Commonly known as: PROVENTIL HFA;VENTOLIN HFA;PROAIR HFA   2 puffs, Inhalation, Every 4 Hours PRN      aspirin 81 MG chewable tablet   81 mg, Oral, Daily         Stop These Medications    acetaminophen 500 MG tablet  Commonly known as: TYLENOL     B COMPLEX-VITAMIN B12 PO     cefdinir 300 MG capsule  Commonly known as: OMNICEF     ferrous gluconate 324 MG tablet  Commonly known as: FERGON     fluticasone 50 MCG/ACT nasal spray  Commonly known as: FLONASE     folic acid 1 MG tablet  Commonly known as: FOLVITE     metFORMIN 500 MG tablet  Commonly known as: GLUCOPHAGE     PRENATAL 1+1 PO     STOOL SOFTENER PO          Continue Zoloft 50 mg qd  Request albuterol inhaler refill  Remove LIZETH drain prior to discharge  Remove staples and add steri strips to incision prior to discharge  Discharge Diet:     Activity at Discharge:   Activity Instructions     Bathing Restrictions      Type of Restriction: Bathing    Bathing Restrictions: Other    Explain Bathing Restrictions: May shower    Driving Restrictions      Type of Restriction: Driving    Driving Restrictions: No Driving    No driving for 2 weeks post op    Lifting Restrictions      Type of Restriction: Lifting    Lifting Restrictions: Other    Explain Lifing Restrictions: Avoid lifting anything over 15 lbs during first several weeks post op    Sexual Activity Restrictions      Type of Restriction: Sex    Explain Sexual Activity Restrictions: Avoid intercourse until after 6 week post op appt.    Work Restrictions      Type of Restriction: Work    May Return to Work: Other    Return to Work Instructions: May return to work in 6 to 8 weeks post op          Follow-up Appointments  No future appointments.  Additional Instructions for the Follow-ups that You Need to Schedule     Call MD With Problems / Concerns   As directed      Discharge Follow-up with Specified Provider: Appointment with  in 2 weeks; 2 Weeks   As directed      To:  Appointment with  in 2 weeks    Follow Up: 2 Weeks               DIMITRI Harrington  10/04/21  09:53 EDT  Csd

## 2021-10-05 LAB
CYTO UR: NORMAL
CYTO UR: NORMAL
LAB AP CASE REPORT: NORMAL
LAB AP CASE REPORT: NORMAL
LAB AP CLINICAL INFORMATION: NORMAL
LAB AP CLINICAL INFORMATION: NORMAL
PATH REPORT.FINAL DX SPEC: NORMAL
PATH REPORT.FINAL DX SPEC: NORMAL
PATH REPORT.GROSS SPEC: NORMAL
PATH REPORT.GROSS SPEC: NORMAL

## 2022-01-19 ENCOUNTER — LAB (OUTPATIENT)
Dept: LAB | Facility: HOSPITAL | Age: 32
End: 2022-01-19

## 2022-01-19 DIAGNOSIS — Z03.818 ENCOUNTER FOR PATIENT CONCERN ABOUT EXPOSURE TO INFECTIOUS ORGANISM: Primary | ICD-10-CM

## 2022-01-19 PROCEDURE — U0004 COV-19 TEST NON-CDC HGH THRU: HCPCS

## 2022-01-20 LAB — SARS-COV-2 RNA NOSE QL NAA+PROBE: NOT DETECTED

## 2022-05-02 RX ORDER — AZELASTINE 1 MG/ML
1 SPRAY, METERED NASAL 2 TIMES DAILY
COMMUNITY
End: 2022-10-11

## 2022-05-02 RX ORDER — MONTELUKAST SODIUM 10 MG/1
10 TABLET ORAL NIGHTLY
COMMUNITY
End: 2022-10-11

## 2022-05-02 RX ORDER — FLUTICASONE PROPIONATE 50 MCG
1 SPRAY, SUSPENSION (ML) NASAL 2 TIMES DAILY
COMMUNITY
End: 2022-10-11

## 2022-05-02 RX ORDER — DULAGLUTIDE 1.5 MG/.5ML
INJECTION, SOLUTION SUBCUTANEOUS
COMMUNITY
End: 2022-10-11

## 2022-05-02 RX ORDER — SUMATRIPTAN 50 MG/1
50 TABLET, FILM COATED ORAL
COMMUNITY
End: 2022-10-11

## 2022-05-02 RX ORDER — PHENTERMINE HYDROCHLORIDE 37.5 MG/1
37.5 TABLET ORAL
COMMUNITY
End: 2022-08-31 | Stop reason: ALTCHOICE

## 2022-05-02 RX ORDER — BUPROPION HYDROCHLORIDE 150 MG/1
150 TABLET ORAL DAILY
COMMUNITY
End: 2022-10-11

## 2022-08-08 ENCOUNTER — TELEPHONE (OUTPATIENT)
Dept: OBSTETRICS AND GYNECOLOGY | Facility: CLINIC | Age: 32
End: 2022-08-08

## 2022-08-08 ENCOUNTER — PATIENT MESSAGE (OUTPATIENT)
Dept: OBSTETRICS AND GYNECOLOGY | Facility: CLINIC | Age: 32
End: 2022-08-08

## 2022-08-08 DIAGNOSIS — N80.9 ENDOMETRIOSIS: Primary | ICD-10-CM

## 2022-08-08 NOTE — TELEPHONE ENCOUNTER
Pt called requesting to schedule an ablation with Dr. Forbes. She said that she had spoke with Dr. Forbes about it at her last visit but wasn't sure if she needs to be seen first since it has been a while.

## 2022-08-09 NOTE — TELEPHONE ENCOUNTER
Dr. Forbes pt.     S/w pt she states she would like to go ahead and proceed with ablation due to endometriosis.     Patient states she has already s/w Dr. Forbes about this as well and just would like to get it scheduled.     I told patient I would discuss this with Dr. Forbes and surgery scheduler and go from there. She v/u

## 2022-08-09 NOTE — TELEPHONE ENCOUNTER
From: Gunjan Engel  To: Luzmaria Forbes MD  Sent: 8/8/2022 8:50 PM EDT  Subject: Endometrial ablation    Hello,     I was wondering if it would be possible to schedule an endometrial ablation soon? Can I just schedule it, or do I need to come in for a regular visit first?

## 2022-08-09 NOTE — TELEPHONE ENCOUNTER
Already s/w pt earlier today on the phone and have sent a message to Dr. Forbes and Neo for further assistance.

## 2022-08-11 RX ORDER — ALPRAZOLAM 1 MG/1
1 TABLET ORAL 2 TIMES DAILY PRN
COMMUNITY
Start: 2022-05-24 | End: 2022-10-11

## 2022-08-11 RX ORDER — LISDEXAMFETAMINE DIMESYLATE 20 MG/1
20 CAPSULE ORAL EVERY MORNING
COMMUNITY
Start: 2022-06-30 | End: 2022-10-11

## 2022-08-11 RX ORDER — HYDROXYZINE PAMOATE 25 MG/1
25 CAPSULE ORAL 3 TIMES DAILY PRN
COMMUNITY
Start: 2022-05-04

## 2022-08-11 RX ORDER — ESCITALOPRAM OXALATE 20 MG/1
TABLET ORAL
COMMUNITY
Start: 2022-06-30

## 2022-08-17 NOTE — TELEPHONE ENCOUNTER
S/w pt she v/u Dr. Forbes recommendations.     Unable to schedule patient to come in soon for GYN follow up and U/S before. I told the patient I would have the  give her a call tomorrow to have this scheduled. She v/u     U/S order in.

## 2022-08-17 NOTE — TELEPHONE ENCOUNTER
So from what I can tell we have not seen her since her delivery???   We do not do endometrial ablations for endometriosis as it is for heavy bleeding only.  She also has a h/o myomectomy so we would need this op note to be sure she is a candidate for an endometrial ablation.  I think she needs an appt with an ultrasound to discuss her options.

## 2022-08-31 ENCOUNTER — OFFICE VISIT (OUTPATIENT)
Dept: BARIATRICS/WEIGHT MGMT | Facility: CLINIC | Age: 32
End: 2022-08-31

## 2022-08-31 ENCOUNTER — DOCUMENTATION (OUTPATIENT)
Dept: BARIATRICS/WEIGHT MGMT | Facility: CLINIC | Age: 32
End: 2022-08-31

## 2022-08-31 ENCOUNTER — OFFICE VISIT (OUTPATIENT)
Dept: BEHAVIORAL HEALTH | Facility: CLINIC | Age: 32
End: 2022-08-31

## 2022-08-31 VITALS
SYSTOLIC BLOOD PRESSURE: 114 MMHG | TEMPERATURE: 98 F | RESPIRATION RATE: 18 BRPM | HEIGHT: 64 IN | HEART RATE: 89 BPM | DIASTOLIC BLOOD PRESSURE: 72 MMHG | WEIGHT: 256.5 LBS | BODY MASS INDEX: 43.79 KG/M2 | OXYGEN SATURATION: 99 %

## 2022-08-31 DIAGNOSIS — R10.13 DYSPEPSIA: ICD-10-CM

## 2022-08-31 DIAGNOSIS — E66.01 OBESITY, CLASS III, BMI 40-49.9 (MORBID OBESITY): Primary | ICD-10-CM

## 2022-08-31 DIAGNOSIS — Z71.89 ENCOUNTER FOR PSYCHOLOGICAL ASSESSMENT PRIOR TO BARIATRIC SURGERY: ICD-10-CM

## 2022-08-31 DIAGNOSIS — E66.01 MORBID OBESITY: Primary | ICD-10-CM

## 2022-08-31 DIAGNOSIS — Z63.0 MARITAL PROBLEMS: ICD-10-CM

## 2022-08-31 DIAGNOSIS — F60.81 NARCISSISM: ICD-10-CM

## 2022-08-31 DIAGNOSIS — R12 HEARTBURN: ICD-10-CM

## 2022-08-31 DIAGNOSIS — F32.A DEPRESSION, UNSPECIFIED DEPRESSION TYPE: ICD-10-CM

## 2022-08-31 DIAGNOSIS — R53.83 FATIGUE, UNSPECIFIED TYPE: ICD-10-CM

## 2022-08-31 DIAGNOSIS — J45.909 ASTHMA, UNSPECIFIED ASTHMA SEVERITY, UNSPECIFIED WHETHER COMPLICATED, UNSPECIFIED WHETHER PERSISTENT: ICD-10-CM

## 2022-08-31 PROBLEM — E28.2 PCOS (POLYCYSTIC OVARIAN SYNDROME): Status: ACTIVE | Noted: 2022-08-31

## 2022-08-31 PROCEDURE — 90791 PSYCH DIAGNOSTIC EVALUATION: CPT | Performed by: PSYCHOLOGIST

## 2022-08-31 PROCEDURE — 99204 OFFICE O/P NEW MOD 45 MIN: CPT | Performed by: PHYSICIAN ASSISTANT

## 2022-08-31 RX ORDER — DEXTROAMPHETAMINE SACCHARATE, AMPHETAMINE ASPARTATE, DEXTROAMPHETAMINE SULFATE AND AMPHETAMINE SULFATE 2.5; 2.5; 2.5; 2.5 MG/1; MG/1; MG/1; MG/1
TABLET ORAL
COMMUNITY
Start: 2022-08-24

## 2022-08-31 RX ORDER — LAMOTRIGINE 100 MG/1
TABLET ORAL
COMMUNITY
Start: 2022-08-26 | End: 2022-08-31 | Stop reason: ALTCHOICE

## 2022-08-31 RX ORDER — LAMOTRIGINE 100 MG/1
100 TABLET ORAL DAILY
COMMUNITY

## 2022-08-31 SDOH — SOCIAL STABILITY - SOCIAL INSECURITY: PROBLEMS IN RELATIONSHIP WITH SPOUSE OR PARTNER: Z63.0

## 2022-08-31 NOTE — PROGRESS NOTES
Bariatric Nutrition Consult     Name: Gunjan Engel   : 1990   AGE: 32 y.o.   MRN: [id number]      Consult Date: 2022     Surgery desired: sleeve    Height: 162.6cm                  Current weight: 256lbs                    BMI: 44    Highest weight: current                           Lowest weight: 130lbs    Goals: 120-130lbs, to have energy to keep up with her 2 children        Past Medical History:   Diagnosis Date   • Anxiety    • Asthma    • Back pain    • Bulimia nervosa    • Depression    • Diabetes mellitus (HCC)    • Endometriosis determined by laparoscopy     History of 3 laparoscopies Dr. Evgeny Varela   • GERD (gastroesophageal reflux disease)    • History of COVID-19 2021   • History of uterine fibroid     ,    • Hypertension 2009    H/O   • Migraine    • Ovarian cyst    • PCOS (polycystic ovarian syndrome)    • Polycystic ovary syndrome    • Post partum depression 2016   • Seasonal allergies                                  Diet history reveals 3 meals daily, low in protein and high in carbs. Reports sensory issues with numerous foods.   Breakfast: 1 fig bar/banana  Lunch: raisin bran crunch and milk  Dinner: 6oz chicken breast and 2 servings pasta    Protein sources: chicken, eggs, cheese, occasional tofu, soy, beans    Drinks: water, diet monster    Food allergies/intolerances: mushrooms, shellfish, sensory issues with numerous foods    Night eating: no    Patient has/has not been diagnosed with an eating disorder: previous bulimia in high school    Exercise/activity: 4 times a week    Main bariatric nutrition principles discussed and explained. Patient needs to focus on 100g protein daily, 100-140g carbohydrates daily, healthy fat intake, 64 oz fluid daily, no carbonation, and try protein drinks/protein powders. Avoid high fructose corn syrup. Patient verbalized understanding and queries were answered.  Additional nutritional counseling will be  available      Neida Fan RD,LD

## 2022-08-31 NOTE — PROGRESS NOTES
"[facility] BARIATRIC SURGERY  [address]  [address]  [facility] [id number] [phone]      Patient  Name:  Gunjan Engel  :  1990      Date of Visit: 2022      Chief Complaint:  weight gain; unable to maintain weight loss      History of Present Illness:  Gunjan Engel is a 32 y.o. female who presents today for evaluation, education and consultation regarding metabolic and bariatric surgery with Dr. Wellington.     Gunjan has been overweight for at least 5+ years, has been 35 pounds or more overweight for at least 5 years, has been 100 pounds or more overweight for 3 or more years and started dieting at age 25.  Previous diet attempts include: High Protein, Low Carbohydrate, Low Fat and Fasting; Weight Watchers; Wellbutrin, Amphetamines and Tenuate.  The most weight Gunjan lost was 90 pounds w/ meds and dieting, but was unable to maintain that weight loss.  Her maximum lifetime weight is 265 pounds.      GI: Reports episodic heartburn treated with as needed Tums.  No prior EGD or history of H. pylori.  He also notes postprandial \"stomach upset\" anytime she eats fatty foods.  This has been going on for years and she has never had any gallbladder work-up.  She has had 2  sections via lower transverse incision in  and .  She also has an extensive history of endometriosis and uterine fibroids with 8 or 9 diagnostic laparoscopies with the most recent in 2018.    Cardiac/Pulm: Reports history of asthma and uses albuterol inhaler on a regular basis.  No recent pulmonary evaluation.  She has a previous history of an \"arrhythmia\" was felt secondary to untreated anxiety.  She has not had any issues with this in over 10 years.    Other past medical history includes PCOS, endometriosis, uterine fibroids status post myomectomy x2, anxiety, panic disorder, binge eating disorder, and history of bulimia nervosa in high school in remission.     Complete history has " "been obtained and discussed today, as pertinent to metabolic/ bariatric surgery.     Past Medical History:   Diagnosis Date   • Anxiety    • Arrhythmia     reported hx of \"arrhythmia\" by PCP. No cardiac eval.   • Asthma     uses albuterol fairly often.   • Back pain    • Bulimia nervosa    • Chronic back pain     PRN tylenol   • Depression    • Diabetes mellitus (HCC)    • Dyspepsia     with greasy foods   • Endometriosis determined by laparoscopy     History of 8-9 laparoscopies Dr. Evgeny Varela   • Heartburn     tums PRN. No egd or h pylori   • History of COVID-19 2021   • History of uterine fibroid     ,    • Hypertension     H/O   • Migraine    • Ovarian cyst    • PCOS (polycystic ovarian syndrome)    • Polycystic ovary syndrome    • Post partum depression 2016   • Seasonal allergies      Past Surgical History:   Procedure Laterality Date   •  SECTION Bilateral 2017    Procedure:  SECTION REPEAT;  Surgeon: Cheikh Bonilla MD;  Location:  [facility];  Service:    •  SECTION N/A 10/01/2021    Procedure:  SECTION REPEAT;  Surgeon: Luzmaria Forbes MD;  Location: [facility] LABOR DELIVERY;  Service: Obstetrics/Gynecology;  Laterality: N/A;   • DIAGNOSTIC LAPAROSCOPY      TIMES - for endometriosis; 1067-6603   • MYOMECTOMY  ,        Allergies   Allergen Reactions   • Iodine Anaphylaxis   • Latex Anaphylaxis   • Methylene Blue Anaphylaxis   • Mushroom Extract Complex Anaphylaxis   • Percocet [Oxycodone-Acetaminophen] Anaphylaxis   • Codeine Other (See Comments)   • Shellfish-Derived Products Other (See Comments)     Unsure        Current Outpatient Medications:   •  albuterol sulfate  (90 Base) MCG/ACT inhaler, INHALE 2 PUFFS EVERY 6 HOURS AS NEEDED, Disp: 54 g, Rfl: 0  •  amphetamine-dextroamphetamine (ADDERALL) 10 MG tablet, TAKE 1 TABLET BY MOUTH EVERY AFTERNOON, Disp: , Rfl:   •  Dulaglutide (Trulicity) 1.5 MG/0.5ML solution " pen-injector, Inject  under the skin into the appropriate area as directed., Disp: , Rfl:   •  escitalopram (LEXAPRO) 20 MG tablet, , Disp: , Rfl:   •  lamoTRIgine (LaMICtal) 100 MG tablet, Take 100 mg by mouth Daily., Disp: , Rfl:   •  Vyvanse 20 MG capsule, Take 20 mg by mouth Every Morning, Disp: , Rfl:   •  ALPRAZolam (XANAX) 1 MG tablet, Take 1 mg by mouth 2 (Two) Times a Day As Needed., Disp: , Rfl:   •  aspirin 81 MG chewable tablet, Chew 81 mg Daily., Disp: , Rfl:   •  azelastine (ASTELIN) 0.1 % nasal spray, 1 spray into the nostril(s) as directed by provider 2 (Two) Times a Day. Use in each nostril as directed, Disp: , Rfl:   •  buPROPion XL (WELLBUTRIN XL) 150 MG 24 hr tablet, Take 150 mg by mouth Daily., Disp: , Rfl:   •  EPINEPHrine (EPIPEN IJ), Inject  as directed., Disp: , Rfl:   •  fluticasone (FLONASE) 50 MCG/ACT nasal spray, 1 spray into the nostril(s) as directed by provider 2 (Two) Times a Day., Disp: , Rfl:   •  Fluticasone Furoate-Vilanterol (BREO ELLIPTA IN), Inhale 1 puff Daily., Disp: , Rfl:   •  hydrOXYzine pamoate (VISTARIL) 25 MG capsule, Take 25 mg by mouth 3 (Three) Times a Day As Needed., Disp: , Rfl:   •  ibuprofen (ADVIL,MOTRIN) 600 MG tablet, Take 1 tablet by mouth Every 6 (Six) Hours., Disp: 30 tablet, Rfl: 0  •  metFORMIN (GLUCOPHAGE) 500 MG tablet, Take 500 mg by mouth 3 (Three) Times a Day., Disp: , Rfl:   •  montelukast (SINGULAIR) 10 MG tablet, Take 10 mg by mouth Every Night., Disp: , Rfl:   •  mupirocin (BACTROBAN) 2 % ointment, Apply 1 application topically to the appropriate area as directed 3 (Three) Times a Day., Disp: , Rfl:   •  sertraline (ZOLOFT) 50 MG tablet, Take 50 mg by mouth Daily., Disp: , Rfl:   •  SUMAtriptan (IMITREX) 50 MG tablet, Take 50 mg by mouth Every 2 (Two) Hours As Needed for Migraine. Take one tablet at onset of headache. May repeat dose one time in 2 hours if headache not relieved., Disp: , Rfl:     Social History     Socioeconomic History   •  Marital status:    Tobacco Use   • Smoking status: Never Smoker   • Smokeless tobacco: Never Used   Vaping Use   • Vaping Use: Never used   Substance and Sexual Activity   • Alcohol use: Yes     Alcohol/week: 1.0 standard drink     Types: 1 Glasses of wine per week     Comment: occasional, glass of wine every couple weeks   • Drug use: Never   • Sexual activity: Yes     Partners: Male     Birth control/protection: Tubal ligation     Social History     Social History Narrative    Pt lives in [facility], she is  with 2 children, she is currently a homemaker, grad student. 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        Family History   Problem Relation Age of Onset   • Cancer Mother    • Heart disease Mother    • Hypertension Mother    • Diabetes Mother    • Ovarian cancer Mother 36   • Obesity Mother    • Hypertension Father    • Colon cancer Father 40   • Sleep apnea Father    • Obesity Sister    • Obesity Sister    • Drug abuse Brother    • Hypertension Maternal Grandmother    • Diabetes Maternal Grandmother    • Obesity Maternal Grandmother    • Cancer Paternal Grandmother    • Osteoporosis Paternal Grandmother    • Breast cancer Maternal Aunt 40   • Ovarian cancer Other        Review of Systems:  Constitutional:  reports fatigue, weight gain and denies fevers, chills.  HEENT:  denies headache, ear pain or loss of hearing, blurred or double vision, nasal discharge or sore throat.  Cardiovascular:  reports HTN, palpitations and denies HLD, CAD, Atrial Fib, hx heart disease, heart murmur, hx MI, chest pain, edema, hx DVT.  Respiratory:  reports asthma and denies dyspnea on exertion, shortness of breath , cough , wheezing, sleep apnea, COPD, hx PE.  Gastrointestinal:  reports heartburn and denies dysphagia, nausea, vomiting, abdominal pain, IBS, diarrhea, constipation, melena, blood in stool, gallbladder issues, liver disease, hx pancreatitis.  Genitourinary:  reports incontinence and denies history of  frequent UTI, hematuria,  dysuria, polyuria, polydipsia, renal insufficiency, renal failure.    Musculoskeletal:  reports back pain and denies joint pain, arthritis and autoimmune disease.  Neurological:  reports migraines and denies headaches, numbness /tingling, dizziness, confusion, seizure, stroke.  Psychiatric:  reports hx depression, hx anxiety, eating disorder, bulimia, binge eating and denies bipolar disorder, suicidal ideation, hx suicide attempt, hx self injury.  Endocrine:  reports PCOS, endometriosis and denies glucose intolerance, diabetes, thyroid disease, gout.  Hematologic:  reports hx anemia and denies bruising, bleeding disorder, hx blood transfusion.  Skin:  reports none and denies rashes, hx MRSA.    Physical Exam:  Vital Signs:  Weight: 116 kg (256 lb 8 oz)   Body mass index is 44.03 kg/m².  Temp: 98 °F (36.7 °C)   Heart Rate: 89   BP: 114/72     Physical Exam  Constitutional:       Appearance: She is obese.   HENT:      Head: Normocephalic and atraumatic.   Eyes:      Extraocular Movements: Extraocular movements intact.      Conjunctiva/sclera: Conjunctivae normal.   Cardiovascular:      Rate and Rhythm: Normal rate and regular rhythm.   Pulmonary:      Effort: Pulmonary effort is normal.      Breath sounds: Normal breath sounds.   Abdominal:      General: Bowel sounds are normal. There is no distension.      Palpations: Abdomen is soft. There is no mass.      Tenderness: There is no abdominal tenderness.      Comments: Old lap scars; lower transverse scar from prior C-sections   Musculoskeletal:         General: Normal range of motion.      Cervical back: Normal range of motion and neck supple.   Skin:     General: Skin is warm and dry.   Neurological:      General: No focal deficit present.      Mental Status: She is alert and oriented to person, place, and time.   Psychiatric:         Mood and Affect: Mood normal.         Behavior: Behavior normal.         Thought Content: Thought content normal.         Judgment:  Judgment normal.         Patient Active Problem List   Diagnosis   • S/P myomectomy   • Endometriosis determined by laparoscopy   • Anxiety   • Morbid obesity with BMI of 40.0-44.9, adult (HCC)   • Family history of gene mutation   • Previous  delivery affecting pregnancy   • History of COVID-19   • Asthma   • Heartburn   • Dyspepsia   • PCOS (polycystic ovarian syndrome)       Assessment:  32 y.o. female with medically complicated obesity pursuing sleeve gastrectomy.    Metabolic & Bariatric Surgery is deemed medically necessary given the following: Class 3 Severe Obesity (BMI >=40). Obesity-related health conditions include the following: GERD and PCOS, anxiety. Obesity is worsening. BMI is is above average; BMI management plan is completed. We discussed consulting a Bariatric surgeon.        Plan:  Further evaluation will include: CBC, CMP, Lipids, TSH, HgA1C, H.Pylori UBT, EKG, CXR, GBUS, Gallbladder Eval and EGD.    Additional clearances needed prior to surgery will include: Pulmonary.     Patient understands that bariatric surgery is not cosmetic surgery but rather a tool to help make a lifelong commitment to lifestyle changes including diet, exercise and behavior modifications.  The patient has been educated today on those expected postoperative lifestyle changes.  Psychological and Nutritional consultations will be completed prior to surgery.  Instructions on how to access shoutr (an internet based site w/ educational surgical videos) were given to the patient.  Recommended perioperative vitamin supplementation was reviewed.  The importance of avoiding ASA/ NSAIDS/ steroids/ tobacco/nicotine/ hormones/ immunomodulators perioperatively was discussed in detail.  All questions/concerns have been addressed.      Further input to follow pending the above.           FAHEEM Powers

## 2022-09-01 LAB
ALBUMIN SERPL-MCNC: 4.1 G/DL (ref 3.8–4.8)
ALBUMIN/GLOB SERPL: 1.5 {RATIO} (ref 1.2–2.2)
ALP SERPL-CCNC: 85 IU/L (ref 44–121)
ALT SERPL-CCNC: 18 IU/L (ref 0–32)
AST SERPL-CCNC: 21 IU/L (ref 0–40)
BASOPHILS # BLD AUTO: 0.1 X10E3/UL (ref 0–0.2)
BASOPHILS NFR BLD AUTO: 1 %
BILIRUB SERPL-MCNC: 0.3 MG/DL (ref 0–1.2)
BUN SERPL-MCNC: 8 MG/DL (ref 6–20)
BUN/CREAT SERPL: 11 (ref 9–23)
CALCIUM SERPL-MCNC: 9.3 MG/DL (ref 8.7–10.2)
CHLORIDE SERPL-SCNC: 103 MMOL/L (ref 96–106)
CHOLEST SERPL-MCNC: 161 MG/DL (ref 100–199)
CO2 SERPL-SCNC: 21 MMOL/L (ref 20–29)
CREAT SERPL-MCNC: 0.73 MG/DL (ref 0.57–1)
EGFRCR-CYS SERPLBLD CKD-EPI 2021: 111 ML/MIN/1.73
EOSINOPHIL # BLD AUTO: 0.1 X10E3/UL (ref 0–0.4)
EOSINOPHIL NFR BLD AUTO: 1 %
ERYTHROCYTE [DISTWIDTH] IN BLOOD BY AUTOMATED COUNT: 13.3 % (ref 11.7–15.4)
GLOBULIN SER CALC-MCNC: 2.8 G/DL (ref 1.5–4.5)
GLUCOSE SERPL-MCNC: 75 MG/DL (ref 65–99)
HBA1C MFR BLD: 5.1 % (ref 4.8–5.6)
HCT VFR BLD AUTO: 41.7 % (ref 34–46.6)
HDLC SERPL-MCNC: 37 MG/DL
HGB BLD-MCNC: 13 G/DL (ref 11.1–15.9)
IMM GRANULOCYTES # BLD AUTO: 0 X10E3/UL (ref 0–0.1)
IMM GRANULOCYTES NFR BLD AUTO: 0 %
LDLC SERPL CALC-MCNC: 103 MG/DL (ref 0–99)
LYMPHOCYTES # BLD AUTO: 3.1 X10E3/UL (ref 0.7–3.1)
LYMPHOCYTES NFR BLD AUTO: 36 %
MCH RBC QN AUTO: 27 PG (ref 26.6–33)
MCHC RBC AUTO-ENTMCNC: 31.2 G/DL (ref 31.5–35.7)
MCV RBC AUTO: 87 FL (ref 79–97)
MONOCYTES # BLD AUTO: 0.5 X10E3/UL (ref 0.1–0.9)
MONOCYTES NFR BLD AUTO: 6 %
NEUTROPHILS # BLD AUTO: 4.8 X10E3/UL (ref 1.4–7)
NEUTROPHILS NFR BLD AUTO: 56 %
PLATELET # BLD AUTO: 280 X10E3/UL (ref 150–450)
POTASSIUM SERPL-SCNC: 4.4 MMOL/L (ref 3.5–5.2)
PROT SERPL-MCNC: 6.9 G/DL (ref 6–8.5)
RBC # BLD AUTO: 4.82 X10E6/UL (ref 3.77–5.28)
SODIUM SERPL-SCNC: 139 MMOL/L (ref 134–144)
TRIGL SERPL-MCNC: 112 MG/DL (ref 0–149)
TSH SERPL DL<=0.005 MIU/L-ACNC: 1.54 UIU/ML (ref 0.45–4.5)
UREA BREATH TEST QL: NEGATIVE
VLDLC SERPL CALC-MCNC: 21 MG/DL (ref 5–40)
WBC # BLD AUTO: 8.6 X10E3/UL (ref 3.4–10.8)

## 2022-09-08 ENCOUNTER — TELEMEDICINE (OUTPATIENT)
Dept: BARIATRICS/WEIGHT MGMT | Facility: CLINIC | Age: 32
End: 2022-09-08

## 2022-09-08 DIAGNOSIS — E66.01 OBESITY, CLASS III, BMI 40-49.9 (MORBID OBESITY): Primary | ICD-10-CM

## 2022-09-08 PROCEDURE — 99213 OFFICE O/P EST LOW 20 MIN: CPT | Performed by: PHYSICIAN ASSISTANT

## 2022-09-08 NOTE — PROGRESS NOTES
"[facility] Bariatric Surgery  [address]  [address]  [facility] [id number] [phone]      Patient Name:  Gunjan Engel  :  1990      Date of Visit:  2022      Reason for Visit:  Monthly Diet Visit #1       HPI:  Presents for supervised diet visit.  Pursuing metabolic and bariatric surgery w/ Dr. Wellington.    Denies any medical issues since last visit.  Focusing on getting more protein-she does not eat much meat. Reducing carb intake. Avoiding sweetened beverages.  She and her  have been discussing ways they can start implementing meal preparation.  She has purchased the [employer].     Initial weight: 256 lbs.     Past Medical History:   Diagnosis Date   • Anxiety    • Arrhythmia     reported hx of \"arrhythmia\" by PCP. No cardiac eval.   • Asthma     uses albuterol fairly often.   • Back pain    • Bulimia nervosa    • Chronic back pain     PRN tylenol   • Depression    • Diabetes mellitus (HCC)    • Dyspepsia     with greasy foods   • Endometriosis determined by laparoscopy     History of 8-9 laparoscopies Dr. Evgeny Varela   • Heartburn     tums PRN. No egd or h pylori   • History of COVID-19 2021   • History of uterine fibroid     ,    • Hypertension     H/O   • Migraine    • Ovarian cyst    • PCOS (polycystic ovarian syndrome)    • Polycystic ovary syndrome    • Post partum depression 2016   • Seasonal allergies      Past Surgical History:   Procedure Laterality Date   •  SECTION Bilateral 2017    Procedure:  SECTION REPEAT;  Surgeon: Cheikh Bonilla MD;  Location: [facility] LABOR DELIVERY;  Service:    •  SECTION N/A 10/01/2021    Procedure:  SECTION REPEAT;  Surgeon: Luzmaria Forbes MD;  Location: [facility] LABOR DELIVERY;  Service: Obstetrics/Gynecology;  Laterality: N/A;   • DIAGNOSTIC LAPAROSCOPY      TIMES 8- for endometriosis; 6472-3830   • MYOMECTOMY  ,        Allergies   Allergen Reactions "   • Iodine Anaphylaxis   • Latex Anaphylaxis   • Methylene Blue Anaphylaxis   • Mushroom Extract Complex Anaphylaxis   • Percocet [Oxycodone-Acetaminophen] Anaphylaxis   • Codeine Other (See Comments)   • Shellfish-Derived Products Other (See Comments)     Unsure          Current Outpatient Medications:   •  albuterol sulfate  (90 Base) MCG/ACT inhaler, INHALE 2 PUFFS EVERY 6 HOURS AS NEEDED, Disp: 54 g, Rfl: 0  •  ALPRAZolam (XANAX) 1 MG tablet, Take 1 mg by mouth 2 (Two) Times a Day As Needed., Disp: , Rfl:   •  amphetamine-dextroamphetamine (ADDERALL) 10 MG tablet, TAKE 1 TABLET BY MOUTH EVERY AFTERNOON, Disp: , Rfl:   •  aspirin 81 MG chewable tablet, Chew 81 mg Daily., Disp: , Rfl:   •  azelastine (ASTELIN) 0.1 % nasal spray, 1 spray into the nostril(s) as directed by provider 2 (Two) Times a Day. Use in each nostril as directed, Disp: , Rfl:   •  buPROPion XL (WELLBUTRIN XL) 150 MG 24 hr tablet, Take 150 mg by mouth Daily., Disp: , Rfl:   •  Dulaglutide (Trulicity) 1.5 MG/0.5ML solution pen-injector, Inject  under the skin into the appropriate area as directed., Disp: , Rfl:   •  EPINEPHrine (EPIPEN IJ), Inject  as directed., Disp: , Rfl:   •  escitalopram (LEXAPRO) 20 MG tablet, , Disp: , Rfl:   •  fluticasone (FLONASE) 50 MCG/ACT nasal spray, 1 spray into the nostril(s) as directed by provider 2 (Two) Times a Day., Disp: , Rfl:   •  Fluticasone Furoate-Vilanterol (BREO ELLIPTA IN), Inhale 1 puff Daily., Disp: , Rfl:   •  hydrOXYzine pamoate (VISTARIL) 25 MG capsule, Take 25 mg by mouth 3 (Three) Times a Day As Needed., Disp: , Rfl:   •  ibuprofen (ADVIL,MOTRIN) 600 MG tablet, Take 1 tablet by mouth Every 6 (Six) Hours., Disp: 30 tablet, Rfl: 0  •  lamoTRIgine (LaMICtal) 100 MG tablet, Take 100 mg by mouth Daily., Disp: , Rfl:   •  metFORMIN (GLUCOPHAGE) 500 MG tablet, Take 500 mg by mouth 3 (Three) Times a Day., Disp: , Rfl:   •  montelukast (SINGULAIR) 10 MG tablet, Take 10 mg by mouth Every Night.,  Disp: , Rfl:   •  mupirocin (BACTROBAN) 2 % ointment, Apply 1 application topically to the appropriate area as directed 3 (Three) Times a Day., Disp: , Rfl:   •  sertraline (ZOLOFT) 50 MG tablet, Take 50 mg by mouth Daily., Disp: , Rfl:   •  SUMAtriptan (IMITREX) 50 MG tablet, Take 50 mg by mouth Every 2 (Two) Hours As Needed for Migraine. Take one tablet at onset of headache. May repeat dose one time in 2 hours if headache not relieved., Disp: , Rfl:   •  Vyvanse 20 MG capsule, Take 20 mg by mouth Every Morning, Disp: , Rfl:     Social History     Socioeconomic History   • Marital status:    Tobacco Use   • Smoking status: Never Smoker   • Smokeless tobacco: Never Used   Vaping Use   • Vaping Use: Never used   Substance and Sexual Activity   • Alcohol use: Yes     Alcohol/week: 1.0 standard drink     Types: 1 Glasses of wine per week     Comment: occasional, glass of wine every couple weeks   • Drug use: Never   • Sexual activity: Yes     Partners: Male     Birth control/protection: Tubal ligation       Social History     Social History Narrative    Pt lives in [facility], she is  with 2 children, she is currently a homemaker, grad student. 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          There were no vitals taken for this visit.    Physical Exam  Constitutional:       General: She is not in acute distress.  Pulmonary:      Effort: Pulmonary effort is normal.   Neurological:      Mental Status: She is alert and oriented to person, place, and time.   Psychiatric:         Mood and Affect: Mood normal.         Behavior: Behavior normal.         Thought Content: Thought content normal.         Judgment: Judgment normal.           Assessment:   pursuing metabolic and bariatric surgery w/ Dr. Wellington.     ICD-10-CM ICD-9-CM   1. Obesity, Class III, BMI 40-49.9 (morbid obesity) ([facility])  E66.01 278.01       Discussion/Plan:  During diet appointments the patient is educated on high quality nutrition and habits to facilitate good health and  possibly some weight loss. Necessary lifestyle changes and behavior modifications were discussed. Please note, the patient remains compliant in completing her diet requirements.     Goals:   1. Continue to be mindful of healthy food choices and portion control.  Get in the habit of reading ingredient list and working to avoid high fructose corn syrup and other preservatives.  Focusing primarily on whole foods.  2. Increase daily protein intake and reduce sugary carbs.  3. Increase daily exercise/activity as able.   4.  Implement a meal preparation plan to help with her and her 's busy schedules.   5. Focus on eating 3 meals a day +/- small snack in between.      Follow up in 1 month. Call w/ issues/concerns.

## 2022-10-05 ENCOUNTER — OFFICE VISIT (OUTPATIENT)
Dept: PULMONOLOGY | Facility: CLINIC | Age: 32
End: 2022-10-05

## 2022-10-05 ENCOUNTER — HOSPITAL ENCOUNTER (OUTPATIENT)
Dept: ULTRASOUND IMAGING | Facility: HOSPITAL | Age: 32
Discharge: HOME | End: 2022-10-05
Admitting: PHYSICIAN ASSISTANT

## 2022-10-05 VITALS
RESPIRATION RATE: 18 BRPM | HEIGHT: 64 IN | DIASTOLIC BLOOD PRESSURE: 70 MMHG | SYSTOLIC BLOOD PRESSURE: 112 MMHG | WEIGHT: 253 LBS | OXYGEN SATURATION: 99 % | HEART RATE: 88 BPM | BODY MASS INDEX: 43.19 KG/M2

## 2022-10-05 DIAGNOSIS — R06.02 SHORTNESS OF BREATH: Primary | ICD-10-CM

## 2022-10-05 DIAGNOSIS — E66.01 MORBID OBESITY, UNSPECIFIED OBESITY TYPE: ICD-10-CM

## 2022-10-05 DIAGNOSIS — R10.13 DYSPEPSIA: ICD-10-CM

## 2022-10-05 DIAGNOSIS — Z01.811 PREOP PULMONARY/RESPIRATORY EXAM: ICD-10-CM

## 2022-10-05 DIAGNOSIS — J45.30 MILD PERSISTENT ASTHMA, UNSPECIFIED WHETHER COMPLICATED: Primary | ICD-10-CM

## 2022-10-05 PROCEDURE — 94060 EVALUATION OF WHEEZING: CPT | Performed by: INTERNAL MEDICINE

## 2022-10-05 PROCEDURE — 99204 OFFICE O/P NEW MOD 45 MIN: CPT | Performed by: INTERNAL MEDICINE

## 2022-10-05 PROCEDURE — 95012 NITRIC OXIDE EXP GAS DETER: CPT | Performed by: INTERNAL MEDICINE

## 2022-10-05 PROCEDURE — 76705 ECHO EXAM OF ABDOMEN: CPT

## 2022-10-05 RX ORDER — DEXTROAMPHETAMINE SACCHARATE, AMPHETAMINE ASPARTATE MONOHYDRATE, DEXTROAMPHETAMINE SULFATE AND AMPHETAMINE SULFATE 3.75; 3.75; 3.75; 3.75 MG/1; MG/1; MG/1; MG/1
CAPSULE, EXTENDED RELEASE ORAL
COMMUNITY
Start: 2022-09-07

## 2022-10-05 RX ORDER — ALBUTEROL SULFATE 90 UG/1
2 AEROSOL, METERED RESPIRATORY (INHALATION) EVERY 4 HOURS PRN
Qty: 18 G | Refills: 5 | Status: SHIPPED | OUTPATIENT
Start: 2022-10-05

## 2022-10-05 NOTE — PROGRESS NOTES
"  CONSULT NOTE    Requested by:   Jovita Morris PA   System, Provider Not In      Chief Complaint   Patient presents with   • Breathing Problem   • Consult     Needs clearance for bariatric surgery.       Subjective:  Gunjan Engel is a 32 y.o. female.     History of Present Illness   Patient comes in today for consultation because of shortness of breath for the past few years    The patient says that her shortness of breath is more pronounced during the evening. It is also associated with wheezing. Patient says that she occasionally has a cough that usually follow seasonal changes.    The patient denies any pets at home. There seems to be a seasonal variation to the symptoms.    The patient does not have a family history of asthma. She reports known personal history of asthma.    The patient denies a history of smoking.    The patient says that she has never been on mechanical ventilation. she is currently not on oxygen.       The following portions of the patient's history were reviewed and updated as appropriate: allergies, current medications, past family history, past medical history, past social history and past surgical history.    Review of Systems   Constitutional: Positive for fatigue.   Respiratory: Positive for cough, shortness of breath and wheezing.    Cardiovascular: Negative for chest pain.   Genitourinary: Positive for frequency.   Allergic/Immunologic: Positive for food allergies.   Neurological: Positive for headaches.   All other systems reviewed and are negative.      Past Medical History:   Diagnosis Date   • Anxiety    • Arrhythmia     reported hx of \"arrhythmia\" by PCP. No cardiac eval.   • Asthma     uses albuterol fairly often.   • Back pain    • Bulimia nervosa    • Chronic back pain     PRN tylenol   • Depression    • Diabetes mellitus (HCC)    • Dyspepsia     with greasy foods   • Endometriosis determined by laparoscopy     History of 8-9 laparoscopies Dr. Evgeny Varela   • " "Heartburn     tums PRN. No egd or h pylori   • History of COVID-19 08/31/2021   • History of uterine fibroid     2013, 2015   • Hypertension 2009    H/O   • Migraine    • Ovarian cyst    • PCOS (polycystic ovarian syndrome)    • Polycystic ovary syndrome    • Post partum depression 12/29/2016   • Seasonal allergies        Social History     Tobacco Use   • Smoking status: Never Smoker   • Smokeless tobacco: Never Used   Substance Use Topics   • Alcohol use: Yes     Alcohol/week: 1.0 standard drink     Types: 1 Glasses of wine per week     Comment: occasional, glass of wine every couple weeks       Objective:  Visit Vitals  /70   Pulse 88   Resp 18   Ht 162.6 cm (64\")   Wt 115 kg (253 lb)   SpO2 99%   BMI 43.43 kg/m²       Physical Exam  Vitals reviewed.   Constitutional:       Appearance: She is well-developed.   HENT:      Head:      Comments: No acute lesions noted     Mouth/Throat:      Mouth: Mucous membranes are moist.   Neck:      Thyroid: No thyromegaly.      Vascular: No JVD.   Cardiovascular:      Rate and Rhythm: Normal rate and regular rhythm.      Heart sounds: No murmur heard.  Pulmonary:      Effort: Pulmonary effort is normal. No respiratory distress.      Breath sounds: No wheezing or rales.   Musculoskeletal:      Cervical back: Neck supple.      Comments: Gait was normal   Skin:     General: Skin is warm and dry.   Neurological:      Mental Status: She is alert and oriented to person, place, and time.   Psychiatric:         Behavior: Behavior normal.           Assessment/Plan:  Diagnoses and all orders for this visit:    1. Mild persistent asthma, unspecified whether complicated (Primary)  -     Spirometry With Bronchodilator  -     Nitric Oxide    2. Preop pulmonary/respiratory exam    3. Morbid obesity, unspecified obesity type (HCC)    Other orders  -     albuterol sulfate HFA (Ventolin HFA) 108 (90 Base) MCG/ACT inhaler; Inhale 2 puffs Every 4 (Four) Hours As Needed for Wheezing or " Shortness of Air.  Dispense: 18 g; Refill: 5        Return if symptoms worsen or fail to improve.    DISCUSSION(if any):  I have also reviewed her referring provider's last note that mentions history of asthma and possible bariatric surgery.     ===========================  ===========================    PFTs were reviewed. Minimal obstruction without significant BD response.     FeNO level was 12 today.    Laboratory workup also showed   Lab Results   Component Value Date    HGB 13.0 08/31/2022    HGB 10.2 (L) 10/02/2021    HGB 12.0 10/01/2021   ,   Lab Results   Component Value Date    HCT 41.7 08/31/2022    HCT 30.3 (L) 10/02/2021    HCT 36.6 10/01/2021       Lab Results   Component Value Date    EOSABS 0.1 08/31/2022    EOSABS 0.07 10/02/2021    EOSABS 0.1 03/09/2021    & Laboratory workup also showed   Lab Results   Component Value Date    CO2 21 08/31/2022     Lab Results   Component Value Date    HGBBG 13.6 (L) 10/01/2021    HGBBG 14.7 10/01/2021     ===========================  ===========================    I had a detailed discussion with the patient regarding her symptoms that are very suggestive of asthma    Orders as above.    Other contributing factors may also need to be treated and this will be discussed with the patient, if and when applicable    Patient was advised to use rescue inhaler for when necessary purposes    Patient was also advised to keep a log of the use of rescue inhaler.    Patient was given reading material.    ============================  ============================    From Pulmonary stand point, her risk for the proposed procedure appears to be.  Low      Postoperative recommendations:            * Out of bed to chair, as soon as feasible.            * Albuterol & Atrovent nebulizers Q4hr PRN            * Incentive spirometry every hour.            * Minimize the use of NG tube.            * Keep O2sat at 88% or more, with minimum supplemental O2.            * Minimize anesthesia  time, as much as possible              Pulmonary risk stratification needs to be discussed with the physician performing the procedure and, apart from procedures involving pulmonary resection, should not be used alone to determine patient's appropriateness for the planned procedure.    Dictated utilizing Dragon dictation.    This document was electronically signed by Mackenzie Wilder MD on 10/05/22 at 15:47 EDT

## 2022-10-06 ENCOUNTER — TELEMEDICINE (OUTPATIENT)
Dept: BARIATRICS/WEIGHT MGMT | Facility: CLINIC | Age: 32
End: 2022-10-06

## 2022-10-06 DIAGNOSIS — R10.13 DYSPEPSIA: ICD-10-CM

## 2022-10-06 DIAGNOSIS — E66.01 OBESITY, CLASS III, BMI 40-49.9 (MORBID OBESITY): Primary | ICD-10-CM

## 2022-10-06 PROCEDURE — 99213 OFFICE O/P EST LOW 20 MIN: CPT | Performed by: PHYSICIAN ASSISTANT

## 2022-10-06 NOTE — PROGRESS NOTES
"[facility]  [address]  [address]  [facility] [id number] [phone]    This visit was conducted as a video visit, in an effort to limit spread of the novel coronavirus during the COVID-19 pandemic.  The patient gave consent.      Patient Name:  Gunjan Engel  :  1990      Date of Visit:  10/06/2022      Reason for Visit:  Monthly Diet Visit #2       HPI:  Presents for supervised diet visit.  Pursuing metabolic and bariatric surgery w/ Dr. Wellington.    Denies any medical issues since last visit.  Focusing on reducing sugar-has completely eliminated. Stopped drinking Monsters. Trying to increase protein intake- having more greek yogurt, eggs, chicken. Working on meal preparation. Working on not eating out at restaurants.      Initial weight: 256 lbs.  Current weight:  253 lbs.    Past Medical History:   Diagnosis Date   • Anxiety    • Arrhythmia     reported hx of \"arrhythmia\" by PCP. No cardiac eval.   • Asthma     uses albuterol fairly often.   • Back pain    • Bulimia nervosa    • Chronic back pain     PRN tylenol   • Depression    • Diabetes mellitus (HCC)    • Dyspepsia     with greasy foods   • Endometriosis determined by laparoscopy     History of 8-9 laparoscopies Dr. Evgeny Varela   • Heartburn     tums PRN. No egd or h pylori   • History of COVID-19 2021   • History of uterine fibroid     ,    • Hypertension     H/O   • Migraine    • Ovarian cyst    • PCOS (polycystic ovarian syndrome)    • Polycystic ovary syndrome    • Post partum depression 2016   • Seasonal allergies      Past Surgical History:   Procedure Laterality Date   •  SECTION Bilateral 2017    Procedure:  SECTION REPEAT;  Surgeon: Cheikh Bonilla MD;  Location:  [facility];  Service:    •  SECTION N/A 10/01/2021    Procedure:  SECTION REPEAT;  Surgeon: Luzmaria Forbes MD;  Location:  [facility];  Service: " Obstetrics/Gynecology;  Laterality: N/A;   • DIAGNOSTIC LAPAROSCOPY      TIMES 8-9 for endometriosis; 6855-8090   • MYOMECTOMY  2013, 2015       Allergies   Allergen Reactions   • Iodine Anaphylaxis   • Latex Anaphylaxis   • Methylene Blue Anaphylaxis   • Mushroom Extract Complex Anaphylaxis   • Percocet [Oxycodone-Acetaminophen] Anaphylaxis   • Codeine Other (See Comments)   • Shellfish-Derived Products Other (See Comments)     Unsure          Current Outpatient Medications:   •  albuterol sulfate HFA (Ventolin HFA) 108 (90 Base) MCG/ACT inhaler, Inhale 2 puffs Every 4 (Four) Hours As Needed for Wheezing or Shortness of Air., Disp: 18 g, Rfl: 5  •  amphetamine-dextroamphetamine (ADDERALL) 10 MG tablet, TAKE 1 TABLET BY MOUTH EVERY AFTERNOON, Disp: , Rfl:   •  amphetamine-dextroamphetamine XR (ADDERALL XR) 15 MG 24 hr capsule, , Disp: , Rfl:   •  EPINEPHrine (EPIPEN IJ), Inject  as directed., Disp: , Rfl:   •  escitalopram (LEXAPRO) 20 MG tablet, , Disp: , Rfl:   •  hydrOXYzine pamoate (VISTARIL) 25 MG capsule, Take 25 mg by mouth 3 (Three) Times a Day As Needed., Disp: , Rfl:   •  lamoTRIgine (LaMICtal) 100 MG tablet, Take 100 mg by mouth Daily., Disp: , Rfl:   •  metFORMIN (GLUCOPHAGE) 500 MG tablet, Take 500 mg by mouth 3 (Three) Times a Day., Disp: , Rfl:   •  ALPRAZolam (XANAX) 1 MG tablet, Take 1 mg by mouth 2 (Two) Times a Day As Needed., Disp: , Rfl:   •  aspirin 81 MG chewable tablet, Chew 81 mg Daily., Disp: , Rfl:   •  azelastine (ASTELIN) 0.1 % nasal spray, 1 spray into the nostril(s) as directed by provider 2 (Two) Times a Day. Use in each nostril as directed, Disp: , Rfl:   •  buPROPion XL (WELLBUTRIN XL) 150 MG 24 hr tablet, Take 150 mg by mouth Daily., Disp: , Rfl:   •  Dulaglutide (Trulicity) 1.5 MG/0.5ML solution pen-injector, Inject  under the skin into the appropriate area as directed., Disp: , Rfl:   •  fluticasone (FLONASE) 50 MCG/ACT nasal spray, 1 spray into the nostril(s) as directed by  provider 2 (Two) Times a Day., Disp: , Rfl:   •  Fluticasone Furoate-Vilanterol (BREO ELLIPTA IN), Inhale 1 puff Daily., Disp: , Rfl:   •  montelukast (SINGULAIR) 10 MG tablet, Take 10 mg by mouth Every Night., Disp: , Rfl:   •  mupirocin (BACTROBAN) 2 % ointment, Apply 1 application topically to the appropriate area as directed 3 (Three) Times a Day., Disp: , Rfl:   •  sertraline (ZOLOFT) 50 MG tablet, Take 50 mg by mouth Daily., Disp: , Rfl:   •  SUMAtriptan (IMITREX) 50 MG tablet, Take 50 mg by mouth Every 2 (Two) Hours As Needed for Migraine. Take one tablet at onset of headache. May repeat dose one time in 2 hours if headache not relieved., Disp: , Rfl:   •  Vyvanse 20 MG capsule, Take 20 mg by mouth Every Morning, Disp: , Rfl:     Social History     Socioeconomic History   • Marital status:    Tobacco Use   • Smoking status: Never Smoker   • Smokeless tobacco: Never Used   Vaping Use   • Vaping Use: Never used   Substance and Sexual Activity   • Alcohol use: Yes     Alcohol/week: 1.0 standard drink     Types: 1 Glasses of wine per week     Comment: occasional, glass of wine every couple weeks   • Drug use: Never   • Sexual activity: Yes     Partners: Male     Birth control/protection: Tubal ligation       Social History     Social History Narrative    Pt lives in [facility], she is  with 2 children, she is currently a homemaker, grad student. 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          There were no vitals taken for this visit.    Physical Exam  Constitutional:       General: She is not in acute distress.  Pulmonary:      Effort: Pulmonary effort is normal.   Neurological:      Mental Status: She is alert and oriented to person, place, and time.   Psychiatric:         Mood and Affect: Mood normal.         Behavior: Behavior normal.         Thought Content: Thought content normal.         Judgment: Judgment normal.           Assessment:   pursuing metabolic and bariatric surgery w/ Dr. Wellington.    ICD-10-CM ICD-9-CM   1.  Obesity, Class III, BMI 40-49.9 (morbid obesity) ([facility])  E66.01 278.01   2. Dyspepsia  R10.13 536.8       Discussion/Plan:  During diet appointments the patient is educated on high quality nutrition and habits to facilitate good health and possibly some weight loss. Necessary lifestyle changes and behavior modifications were discussed. Please note, the patient remains compliant in completing her diet requirements.     Goals:   1. Continue to be mindful of healthy food choices and portion control.  Focus on eating 3 meals a day.  2. Increase daily protein intake and reduce carbs.  3. Increase daily exercise/activity as able.   4.  Continue to work on methods for meal preparation.  5.  Continue avoiding sweetened beverages as well as anything with high fructose corn syrup and artificial preservatives.     Follow up in 1 month. Call w/ issues/concerns.

## 2022-10-11 ENCOUNTER — TELEMEDICINE (OUTPATIENT)
Dept: BARIATRICS/WEIGHT MGMT | Facility: CLINIC | Age: 32
End: 2022-10-11

## 2022-10-11 VITALS — HEIGHT: 64 IN | WEIGHT: 252 LBS | BODY MASS INDEX: 43.02 KG/M2

## 2022-10-11 DIAGNOSIS — R12 HEARTBURN: Primary | ICD-10-CM

## 2022-10-11 PROCEDURE — 99214 OFFICE O/P EST MOD 30 MIN: CPT | Performed by: PHYSICIAN ASSISTANT

## 2022-10-11 RX ORDER — TIRZEPATIDE 2.5 MG/.5ML
INJECTION, SOLUTION SUBCUTANEOUS
COMMUNITY

## 2022-10-11 RX ORDER — METHYLPREDNISOLONE 4 MG/1
TABLET ORAL
COMMUNITY
Start: 2022-10-10 | End: 2022-10-18

## 2022-10-11 NOTE — PROGRESS NOTES
"[facility]  [address]  [address]  [facility] [id number] [phone]      Patient Name:  Gunjan Engel  :  1990      Date of Visit:  10/06/2022      Reason for Visit:  heartburn       HPI:  33 y/o female pursuing metabolic and bariatric surgery w/ Dr. Wellington.    Initial Intake Eval 22:  \"Gunjan has been overweight for at least 5+ years, has been 35 pounds or more overweight for at least 5 years, has been 100 pounds or more overweight for 3 or more years and started dieting at age 25.  Previous diet attempts include: High Protein, Low Carbohydrate, Low Fat and Fasting; Weight Watchers; Wellbutrin, Amphetamines and Tenuate.  The most weight Gunjan lost was 90 pounds w/ meds and dieting, but was unable to maintain that weight loss.  Her maximum lifetime weight is 265 pounds.       GI: Reports episodic heartburn treated with as needed Tums.  No prior EGD or history of H. pylori.  He also notes postprandial \"stomach upset\" anytime she eats fatty foods.  This has been going on for years and she has never had any gallbladder work-up.  She has had 2  sections via lower transverse incision in  and .  She also has an extensive history of endometriosis and uterine fibroids with 8 or 9 diagnostic laparoscopies with the most recent in 2018.     Cardiac/Pulm: Reports history of asthma and uses albuterol inhaler on a regular basis.  No recent pulmonary evaluation.  She has a previous history of an \"arrhythmia\" was felt secondary to untreated anxiety.  She has not had any issues with this in over 10 years.     Other past medical history includes PCOS, endometriosis, uterine fibroids status post myomectomy x2, anxiety, panic disorder, binge eating disorder, and history of bulimia nervosa in high school in remission.\"    --- As above, notes hx episodic heartburn and \"stomach upset\" after eating fatty foods, takes TUMS prn.  Denies prior eval.  Recent " "H.Pylori UBT negative.  GBUS 10/5/22 negative.  HIDA ordered, pending.     Kids currently home, on  break, w/ RSV.  She has some sinus congestion but o/w feels fine.  Denies fevers/chills/dyspnea.        Past Medical History:   Diagnosis Date   • Anxiety    • Arrhythmia     reported hx of \"arrhythmia\" by PCP. No cardiac eval.   • Asthma     uses albuterol fairly often.   • Bulimia nervosa    • Chronic back pain     PRN tylenol   • Depression    • Diabetes mellitus (HCC)    • Dyspepsia     with greasy foods   • Endometriosis determined by laparoscopy     History of 8-9 laparoscopies Dr. Evgeny Varela   • Heartburn     tums PRN. No egd or h pylori   • History of COVID-19 2021   • History of uterine fibroid     ,    • Hypertension     H/O   • Migraine    • Ovarian cyst    • PCOS (polycystic ovarian syndrome)    • Post partum depression 2016   • Seasonal allergies      Past Surgical History:   Procedure Laterality Date   •  SECTION Bilateral 2017    Procedure:  SECTION REPEAT;  Surgeon: Cheikh Bonilla MD;  Location:  [facility];  Service:    •  SECTION N/A 10/01/2021    Procedure:  SECTION REPEAT;  Surgeon: Luzmaria Forbes MD;  Location:  [facility];  Service: Obstetrics/Gynecology;  Laterality: N/A;   • DIAGNOSTIC LAPAROSCOPY      TIMES - for endometriosis; 1843-8877   • MYOMECTOMY  2015       Allergies   Allergen Reactions   • Iodine Anaphylaxis   • Latex Anaphylaxis   • Methylene Blue Anaphylaxis   • Mushroom Extract Complex Anaphylaxis   • Percocet [Oxycodone-Acetaminophen] Anaphylaxis   • Codeine Other (See Comments)   • Shellfish-Derived Products Other (See Comments)     Unsure          Current Outpatient Medications:   •  albuterol sulfate HFA (Ventolin HFA) 108 (90 Base) MCG/ACT inhaler, Inhale 2 puffs Every 4 (Four) Hours As Needed for Wheezing or Shortness of Air., Disp: 18 g, Rfl: 5  •  amphetamine-dextroamphetamine " "(ADDERALL) 10 MG tablet, TAKE 1 TABLET BY MOUTH EVERY AFTERNOON, Disp: , Rfl:   •  amphetamine-dextroamphetamine XR (ADDERALL XR) 15 MG 24 hr capsule, , Disp: , Rfl:   •  escitalopram (LEXAPRO) 20 MG tablet, , Disp: , Rfl:   •  hydrOXYzine pamoate (VISTARIL) 25 MG capsule, Take 25 mg by mouth 3 (Three) Times a Day As Needed., Disp: , Rfl:   •  lamoTRIgine (LaMICtal) 100 MG tablet, Take 100 mg by mouth Daily., Disp: , Rfl:   •  Tirzepatide (Mounjaro) 2.5 MG/0.5ML solution pen-injector, Inject  under the skin into the appropriate area as directed., Disp: , Rfl:   •  EPINEPHrine (EPIPEN IJ), Inject  as directed., Disp: , Rfl:   •  methylPREDNISolone (MEDROL) 4 MG dose pack, , Disp: , Rfl:     Social History     Socioeconomic History   • Marital status:    Tobacco Use   • Smoking status: Never   • Smokeless tobacco: Never   Vaping Use   • Vaping Use: Never used   Substance and Sexual Activity   • Alcohol use: Yes     Alcohol/week: 1.0 standard drink     Types: 1 Glasses of wine per week     Comment: occasional, glass of wine every couple weeks   • Drug use: Never   • Sexual activity: Yes     Partners: Male     Birth control/protection: Tubal ligation       Social History     Social History Narrative    Lives in [facility].   with 2 children.  Homemaker, grad student.  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          Ht 162.6 cm (64\")   Wt 114 kg (252 lb)   BMI 43.26 kg/m²     Physical Exam  Constitutional:       General: She is not in acute distress.     Appearance: She is well-developed.   Eyes:      General: No scleral icterus.  Pulmonary:      Effort: Pulmonary effort is normal.   Neurological:      Mental Status: She is alert and oriented to person, place, and time.           Assessment:   32 y.o. female pursuing metabolic and bariatric surgery w/ Dr. Wellington.      ICD-10-CM ICD-9-CM   1. Heartburn  R12 787.1         Plan:  EGD @[facility] w/ Dr. Wellington for further evaluation.  Additional input to follow.         Note: This was an audio " and video enabled telemedicine encounter conducted via Zoom.  Patient is located in [place].  Provider is at her office.  Consent was obtained prior to the visit.

## 2022-10-17 ENCOUNTER — OUTSIDE FACILITY SERVICE (OUTPATIENT)
Dept: BARIATRICS/WEIGHT MGMT | Facility: CLINIC | Age: 32
End: 2022-10-17

## 2022-10-17 ENCOUNTER — LAB REQUISITION (OUTPATIENT)
Dept: LAB | Facility: HOSPITAL | Age: 32
End: 2022-10-17

## 2022-10-17 DIAGNOSIS — R12 HEARTBURN: ICD-10-CM

## 2022-10-17 PROCEDURE — 88305 TISSUE EXAM BY PATHOLOGIST: CPT | Performed by: SURGERY

## 2022-10-17 PROCEDURE — 43239 EGD BIOPSY SINGLE/MULTIPLE: CPT | Performed by: SURGERY

## 2022-10-18 ENCOUNTER — OFFICE VISIT (OUTPATIENT)
Dept: OBSTETRICS AND GYNECOLOGY | Facility: CLINIC | Age: 32
End: 2022-10-18

## 2022-10-18 VITALS
BODY MASS INDEX: 43.46 KG/M2 | WEIGHT: 254.6 LBS | SYSTOLIC BLOOD PRESSURE: 110 MMHG | DIASTOLIC BLOOD PRESSURE: 68 MMHG | HEIGHT: 64 IN

## 2022-10-18 DIAGNOSIS — N80.9 ENDOMETRIOSIS: Primary | ICD-10-CM

## 2022-10-18 DIAGNOSIS — R20.0 LOSS OF SENSATION OF SKIN: ICD-10-CM

## 2022-10-18 DIAGNOSIS — Z80.3 FAMILY HISTORY OF BREAST CANCER: ICD-10-CM

## 2022-10-18 DIAGNOSIS — N92.0 MENORRHAGIA WITH REGULAR CYCLE: ICD-10-CM

## 2022-10-18 DIAGNOSIS — N64.52 BREAST DISCHARGE: ICD-10-CM

## 2022-10-18 DIAGNOSIS — N64.4 BREAST PAIN: ICD-10-CM

## 2022-10-18 LAB
CYTO UR: NORMAL
LAB AP CASE REPORT: NORMAL
LAB AP CLINICAL INFORMATION: NORMAL
PATH REPORT.FINAL DX SPEC: NORMAL
PATH REPORT.GROSS SPEC: NORMAL

## 2022-10-18 PROCEDURE — 99214 OFFICE O/P EST MOD 30 MIN: CPT | Performed by: OBSTETRICS & GYNECOLOGY

## 2022-10-18 NOTE — PROGRESS NOTES
Chief Complaint   Patient presents with   • Follow-up     endometriosis       Subjective   HPI  Gunjan Enegl is a 32 y.o. female, . Her last LMP was Patient's last menstrual period was 10/01/2022.. who presents for follow up on menorrhagia and endometriosis.      Patient has tried Metformin, OCPs, and had 8 laparascopic surgeries in the past. Since then she reports her symptoms/issue has remained unchanged. The patient reports additional symptoms as heavy bleeding. The patient uses a depends every 1-2 hours.       Patient also complaining of breast pain & tenderness. Patient reports a yellow/green discharge from both nipples that has a foul odor. Patient states that this has been occuring for the last 3 months. Patient's sister & mother recently diagnosed with breast cancer. Patient would also like to discuss that she does not have any vaginal sensation during IC.           Additional OB/GYN History     Last Pap :   Last Completed Pap Smear          PAP SMEAR (Every 3 Years) Next due on 3/9/2024    2021  Pap IG, Ct-Ng, HPV-hr    2020  Done - neg    2016  Done - neg                Last mammogram:   Last Completed Mammogram     This patient has no relevant Health Maintenance data.          Tobacco Usage?: No   OB History        4    Para   2    Term   2       0    AB   2    Living   2       SAB   2    IAB   0    Ectopic   0    Molar   0    Multiple   0    Live Births   2          Obstetric Comments   First preg was twins- one twin passed in utero early in pregnancy  FOB 1- 1st preg  FOB 2- 2nd-4th preg               Current Outpatient Medications:   •  albuterol sulfate HFA (Ventolin HFA) 108 (90 Base) MCG/ACT inhaler, Inhale 2 puffs Every 4 (Four) Hours As Needed for Wheezing or Shortness of Air., Disp: 18 g, Rfl: 5  •  amphetamine-dextroamphetamine (ADDERALL) 10 MG tablet, TAKE 1 TABLET BY MOUTH EVERY AFTERNOON, Disp: , Rfl:   •   "amphetamine-dextroamphetamine XR (ADDERALL XR) 15 MG 24 hr capsule, , Disp: , Rfl:   •  EPINEPHrine (EPIPEN IJ), Inject  as directed., Disp: , Rfl:   •  escitalopram (LEXAPRO) 20 MG tablet, , Disp: , Rfl:   •  hydrOXYzine pamoate (VISTARIL) 25 MG capsule, Take 25 mg by mouth 3 (Three) Times a Day As Needed., Disp: , Rfl:   •  lamoTRIgine (LaMICtal) 100 MG tablet, Take 100 mg by mouth Daily., Disp: , Rfl:   •  Tirzepatide (Mounjaro) 2.5 MG/0.5ML solution pen-injector, Inject  under the skin into the appropriate area as directed., Disp: , Rfl:      Past Medical History:   Diagnosis Date   • Anxiety    • Arrhythmia     reported hx of \"arrhythmia\" by PCP. No cardiac eval.   • Asthma     uses albuterol fairly often.   • Bulimia nervosa    • Chronic back pain     PRN tylenol   • Depression    • Diabetes mellitus (HCC)    • Dyspepsia     with greasy foods   • Endometriosis determined by laparoscopy     History of 8-9 laparoscopies Dr. Evgeny Varela   • Heartburn     tums PRN. No egd or h pylori   • History of COVID-19 2021   • History of uterine fibroid     ,    • Hypertension     H/O   • Migraine    • Ovarian cyst    • PCOS (polycystic ovarian syndrome)    • Post partum depression 2016   • Seasonal allergies         Past Surgical History:   Procedure Laterality Date   •  SECTION Bilateral 2017    Procedure:  SECTION REPEAT;  Surgeon: Cheikh Bonilla MD;  Location: [facility] LABOR DELIVERY;  Service:    •  SECTION N/A 10/01/2021    Procedure:  SECTION REPEAT;  Surgeon: Luzmaria Forbes MD;  Location: [facility] LABOR DELIVERY;  Service: Obstetrics/Gynecology;  Laterality: N/A;   • DIAGNOSTIC LAPAROSCOPY      TIMES - for endometriosis; 8469-8091   • MYOMECTOMY  2015       The additional following portions of the patient's history were reviewed and updated as appropriate: allergies and current medications.    Review of Systems   Constitutional: Negative.    HENT: " "Negative.    Eyes: Negative.    Respiratory: Negative.    Cardiovascular: Negative.    Gastrointestinal: Negative.    Endocrine: Negative.    Genitourinary: Positive for breast discharge, breast pain, menstrual problem and vaginal bleeding.   Musculoskeletal: Negative.    Skin: Negative.    Allergic/Immunologic: Negative.    Neurological: Negative.    Hematological: Negative.    Psychiatric/Behavioral: Negative.        I have reviewed and agree with the HPI, ROS, and historical information as entered above. Luzmaria Forbes MD    Objective   /68   Ht 162.6 cm (64\")   Wt 115 kg (254 lb 9.6 oz)   LMP 10/01/2022   Breastfeeding No   BMI 43.70 kg/m²     Physical Exam  Vitals and nursing note reviewed. Exam conducted with a chaperone present.   Constitutional:       Appearance: She is well-developed.   HENT:      Head: Normocephalic.   Eyes:      Conjunctiva/sclera: Conjunctivae normal.   Pulmonary:      Effort: Pulmonary effort is normal. No retractions.   Chest:      Chest wall: No mass.   Breasts:     Right: Nipple discharge present. No mass, skin change or tenderness.      Left: Nipple discharge present. No mass, skin change or tenderness.   Psychiatric:         Behavior: Behavior normal.         Assessment & Plan     Assessment     Problem List Items Addressed This Visit    None  Visit Diagnoses     Endometriosis    -  Primary    Menorrhagia with regular cycle        Breast discharge        Relevant Orders    NON-GYN CYTOLOGY, [facility] ([facility])    Ambulatory Referral to Genetic Counseling/Testing    Mammo Screening Digital Tomosynthesis Bilateral With CAD    Family history of breast cancer        Relevant Orders    Ambulatory Referral to Genetic Counseling/Testing    Mammo Screening Digital Tomosynthesis Bilateral With CAD    Loss of sensation of skin                  Plan     H/O endometriosis - reviewed u/s and she understands u/s is not a good modality for seeing status of endometriosis.  " Discussed her history and pitfalls of definitive treatment at her age.    Menorrhagia - she is interested in an endometrial ablation however she has h/o myomectomy.  I will need to review her operative note prior to being able to say if she is a candidate.  Bilateral nipple d/c - this is only with her expressing.  Bilateral nipple smears sent but encouraged her not to stimulate the nipples or try to express discharge.  Family h/o breast cancer.  Sister was 42.  Will start yearly mammography and send to genetic counseling.  Loss of sensation and groin she attributes to after her spinals.  Rec consideration of PT.         Add:  After checking operative note from myomectomy with Dr. BIGG Varela she is not a candidate for an ablation.  She could consider IUD, Orlissa or Lupron prior to buy time prior to hysterectomy.      Luzmaria Forbes MD  10/18/2022

## 2022-10-19 LAB — REF LAB TEST METHOD: NORMAL

## 2022-10-20 ENCOUNTER — HOSPITAL ENCOUNTER (OUTPATIENT)
Dept: NUCLEAR MEDICINE | Facility: HOSPITAL | Age: 32
Discharge: HOME | End: 2022-10-20

## 2022-10-20 DIAGNOSIS — R10.13 DYSPEPSIA: ICD-10-CM

## 2022-10-20 PROCEDURE — 78227 HEPATOBIL SYST IMAGE W/DRUG: CPT

## 2022-10-20 PROCEDURE — A9537 TC99M MEBROFENIN: HCPCS | Performed by: PHYSICIAN ASSISTANT

## 2022-10-20 PROCEDURE — 0 TECHNETIUM TC 99M MEBROFENIN KIT: Performed by: PHYSICIAN ASSISTANT

## 2022-10-20 RX ORDER — KIT FOR THE PREPARATION OF TECHNETIUM TC 99M MEBROFENIN 45 MG/10ML
1 INJECTION, POWDER, LYOPHILIZED, FOR SOLUTION INTRAVENOUS
Status: COMPLETED | OUTPATIENT
Start: 2022-10-20 | End: 2022-10-20

## 2022-10-20 RX ADMIN — MEBROFENIN 1 DOSE: 45 INJECTION, POWDER, LYOPHILIZED, FOR SOLUTION INTRAVENOUS at 09:05

## 2022-10-25 ENCOUNTER — TELEPHONE (OUTPATIENT)
Dept: OBSTETRICS AND GYNECOLOGY | Facility: CLINIC | Age: 32
End: 2022-10-25

## 2022-10-25 NOTE — TELEPHONE ENCOUNTER
----- Message from Luzmaria Forbes MD sent at 10/18/2022  8:34 PM EDT -----  Gunjan is not a candidate for an endometrial ablation due to her prior myomectomy.  She could consider Mirena IUD, Orlissa or Lupron prior to considering defintive treatment with hsyterectomy

## 2022-11-08 ENCOUNTER — TELEMEDICINE (OUTPATIENT)
Dept: BARIATRICS/WEIGHT MGMT | Facility: CLINIC | Age: 32
End: 2022-11-08

## 2022-11-08 DIAGNOSIS — E66.01 OBESITY, CLASS III, BMI 40-49.9 (MORBID OBESITY): Primary | ICD-10-CM

## 2022-11-08 PROCEDURE — 99213 OFFICE O/P EST LOW 20 MIN: CPT | Performed by: PHYSICIAN ASSISTANT

## 2022-11-08 NOTE — PROGRESS NOTES
"[facility] Bariatric Surgery  [address]  [address]  [facility] [id number] [phone]      Patient Name:  Gunjan Engel  :  1990      Date of Visit:  2022      Reason for Visit:  Monthly Diet Visit #3 of 4       HPI:  Presents for supervised diet visit.  Pursuing metabolic and bariatric surgery w/ Dr. Wellington.    Denies any medical issues since last visit.  Significant reduced sugar intake. Medications changed recently to Mounjaro. Down to one energy drink a day-Celsius. Has stopped drinking all monsters. Working on increasing protein. Drinking protein shakes, chicken, cheese, yogurt. Focusing on 3 meals/day.     Initial weight: 256 lbs.  Current weight:  242 lbs.    Past Medical History:   Diagnosis Date   • Anxiety    • Arrhythmia     reported hx of \"arrhythmia\" by PCP. No cardiac eval.   • Asthma     uses albuterol fairly often.   • Bulimia nervosa    • Chronic back pain     PRN tylenol   • Depression    • Diabetes mellitus (HCC)    • Dyspepsia     with greasy foods   • Endometriosis determined by laparoscopy     History of 8-9 laparoscopies Dr. Evgeny Varela   • Heartburn     tums PRN. No egd or h pylori   • History of COVID-19 2021   • History of uterine fibroid     ,    • Hypertension     H/O   • Migraine    • Ovarian cyst    • PCOS (polycystic ovarian syndrome)    • Post partum depression 2016   • Seasonal allergies      Past Surgical History:   Procedure Laterality Date   •  SECTION Bilateral 2017    Procedure:  SECTION REPEAT;  Surgeon: Cheikh Bonilla MD;  Location: [facility] LABOR DELIVERY;  Service:    •  SECTION N/A 10/01/2021    Procedure:  SECTION REPEAT;  Surgeon: Luzmaria Forbes MD;  Location: [facility] LABOR DELIVERY;  Service: Obstetrics/Gynecology;  Laterality: N/A;   • DIAGNOSTIC LAPAROSCOPY      TIMES - for endometriosis; 8539-4979   • MYOMECTOMY  2015       Allergies   Allergen Reactions "   • Iodine Anaphylaxis   • Latex Anaphylaxis   • Methylene Blue Anaphylaxis   • Mushroom Extract Complex Anaphylaxis   • Percocet [Oxycodone-Acetaminophen] Anaphylaxis   • Codeine Other (See Comments)   • Shellfish-Derived Products Other (See Comments)     Unsure          Current Outpatient Medications:   •  albuterol sulfate HFA (Ventolin HFA) 108 (90 Base) MCG/ACT inhaler, Inhale 2 puffs Every 4 (Four) Hours As Needed for Wheezing or Shortness of Air., Disp: 18 g, Rfl: 5  •  amphetamine-dextroamphetamine XR (ADDERALL XR) 15 MG 24 hr capsule, , Disp: , Rfl:   •  EPINEPHrine (EPIPEN IJ), Inject  as directed., Disp: , Rfl:   •  escitalopram (LEXAPRO) 20 MG tablet, , Disp: , Rfl:   •  hydrOXYzine pamoate (VISTARIL) 25 MG capsule, Take 25 mg by mouth 3 (Three) Times a Day As Needed., Disp: , Rfl:   •  lamoTRIgine (LaMICtal) 100 MG tablet, Take 100 mg by mouth Daily., Disp: , Rfl:   •  Tirzepatide (Mounjaro) 2.5 MG/0.5ML solution pen-injector, Inject  under the skin into the appropriate area as directed., Disp: , Rfl:   •  amphetamine-dextroamphetamine (ADDERALL) 10 MG tablet, TAKE 1 TABLET BY MOUTH EVERY AFTERNOON, Disp: , Rfl:     Social History     Socioeconomic History   • Marital status:    Tobacco Use   • Smoking status: Never   • Smokeless tobacco: Never   Vaping Use   • Vaping Use: Never used   Substance and Sexual Activity   • Alcohol use: Yes     Alcohol/week: 1.0 standard drink     Types: 1 Glasses of wine per week     Comment: occasional, glass of wine every couple weeks   • Drug use: Never   • Sexual activity: Yes     Partners: Male     Birth control/protection: Tubal ligation       Social History     Social History Narrative    Lives in [facility].   with 2 children.  Homemaker, grad student.  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          There were no vitals taken for this visit.    Physical Exam  Constitutional:       General: She is not in acute distress.  Pulmonary:      Effort: Pulmonary effort is normal.    Neurological:      Mental Status: She is alert and oriented to person, place, and time.   Psychiatric:         Mood and Affect: Mood normal.         Behavior: Behavior normal.         Thought Content: Thought content normal.         Judgment: Judgment normal.           Assessment:   pursuing metabolic and bariatric surgery w/ Dr. Wellington.    ICD-10-CM ICD-9-CM   1. Obesity, Class III, BMI 40-49.9 (morbid obesity) ([facility])  E66.01 278.01       Discussion/Plan:  During diet appointments the patient is educated on high quality nutrition and habits to facilitate good health and possibly some weight loss. Necessary lifestyle changes and behavior modifications were discussed. Please note, the patient remains compliant in completing her diet requirements.     Goals:   1. Continue to be mindful of healthy food choices and portion control.  Focusing on whole foods and avoiding high fructose corn syrup.  2. Increase daily protein intake and reduce carbs.  Aim for a goal of 100 g of protein per day.  3. Increase intentional daily exercise/activity as able.      Follow up in 1 month. Call w/ issues/concerns.

## 2022-11-25 ENCOUNTER — APPOINTMENT (OUTPATIENT)
Dept: CT IMAGING | Facility: HOSPITAL | Age: 32
End: 2022-11-25

## 2022-11-25 ENCOUNTER — HOSPITAL ENCOUNTER (EMERGENCY)
Facility: HOSPITAL | Age: 32
Discharge: HOME | End: 2022-11-25
Attending: EMERGENCY MEDICINE | Admitting: EMERGENCY MEDICINE

## 2022-11-25 VITALS
WEIGHT: 233 LBS | OXYGEN SATURATION: 99 % | HEIGHT: 64 IN | DIASTOLIC BLOOD PRESSURE: 82 MMHG | TEMPERATURE: 98.1 F | SYSTOLIC BLOOD PRESSURE: 127 MMHG | BODY MASS INDEX: 39.78 KG/M2 | RESPIRATION RATE: 20 BRPM | HEART RATE: 89 BPM

## 2022-11-25 DIAGNOSIS — R10.12 LEFT UPPER QUADRANT ABDOMINAL PAIN: Primary | ICD-10-CM

## 2022-11-25 LAB
ALBUMIN SERPL-MCNC: 3.8 G/DL (ref 3.5–5.2)
ALBUMIN/GLOB SERPL: 1.3 G/DL
ALP SERPL-CCNC: 66 U/L (ref 39–117)
ALT SERPL W P-5'-P-CCNC: 14 U/L (ref 1–33)
ANION GAP SERPL CALCULATED.3IONS-SCNC: 11.6 MMOL/L (ref 5–15)
AST SERPL-CCNC: 14 U/L (ref 1–32)
BACTERIA UR QL AUTO: ABNORMAL /HPF
BASOPHILS # BLD AUTO: 0.02 10*3/MM3 (ref 0–0.2)
BASOPHILS NFR BLD AUTO: 0.3 % (ref 0–1.5)
BILIRUB SERPL-MCNC: 0.4 MG/DL (ref 0–1.2)
BILIRUB UR QL STRIP: NEGATIVE
BUN SERPL-MCNC: 8 MG/DL (ref 6–20)
BUN/CREAT SERPL: 11.3 (ref 7–25)
CALCIUM SPEC-SCNC: 8.2 MG/DL (ref 8.6–10.5)
CHLORIDE SERPL-SCNC: 99 MMOL/L (ref 98–107)
CLARITY UR: ABNORMAL
CO2 SERPL-SCNC: 24.4 MMOL/L (ref 22–29)
COLOR UR: ABNORMAL
CREAT SERPL-MCNC: 0.71 MG/DL (ref 0.57–1)
DEPRECATED RDW RBC AUTO: 39.8 FL (ref 37–54)
EGFRCR SERPLBLD CKD-EPI 2021: 115.3 ML/MIN/1.73
EOSINOPHIL # BLD AUTO: 0.01 10*3/MM3 (ref 0–0.4)
EOSINOPHIL NFR BLD AUTO: 0.2 % (ref 0.3–6.2)
ERYTHROCYTE [DISTWIDTH] IN BLOOD BY AUTOMATED COUNT: 13.4 % (ref 12.3–15.4)
GLOBULIN UR ELPH-MCNC: 2.9 GM/DL
GLUCOSE SERPL-MCNC: 104 MG/DL (ref 65–99)
GLUCOSE UR STRIP-MCNC: NEGATIVE MG/DL
HCT VFR BLD AUTO: 43.4 % (ref 34–46.6)
HETEROPH AB SER QL LA: NEGATIVE
HGB BLD-MCNC: 14.3 G/DL (ref 12–15.9)
HGB UR QL STRIP.AUTO: ABNORMAL
HOLD SPECIMEN: NORMAL
HOLD SPECIMEN: NORMAL
HYALINE CASTS UR QL AUTO: ABNORMAL /LPF
IMM GRANULOCYTES # BLD AUTO: 0.02 10*3/MM3 (ref 0–0.05)
IMM GRANULOCYTES NFR BLD AUTO: 0.3 % (ref 0–0.5)
KETONES UR QL STRIP: ABNORMAL
LEUKOCYTE ESTERASE UR QL STRIP.AUTO: ABNORMAL
LIPASE SERPL-CCNC: 19 U/L (ref 13–60)
LYMPHOCYTES # BLD AUTO: 1.27 10*3/MM3 (ref 0.7–3.1)
LYMPHOCYTES NFR BLD AUTO: 21.9 % (ref 19.6–45.3)
MCH RBC QN AUTO: 27 PG (ref 26.6–33)
MCHC RBC AUTO-ENTMCNC: 32.9 G/DL (ref 31.5–35.7)
MCV RBC AUTO: 82 FL (ref 79–97)
MONOCYTES # BLD AUTO: 0.4 10*3/MM3 (ref 0.1–0.9)
MONOCYTES NFR BLD AUTO: 6.9 % (ref 5–12)
NEUTROPHILS NFR BLD AUTO: 4.08 10*3/MM3 (ref 1.7–7)
NEUTROPHILS NFR BLD AUTO: 70.4 % (ref 42.7–76)
NITRITE UR QL STRIP: NEGATIVE
NRBC BLD AUTO-RTO: 0 /100 WBC (ref 0–0.2)
PH UR STRIP.AUTO: 6 [PH] (ref 5–8)
PLATELET # BLD AUTO: 270 10*3/MM3 (ref 140–450)
PMV BLD AUTO: 9.6 FL (ref 6–12)
POTASSIUM SERPL-SCNC: 3.2 MMOL/L (ref 3.5–5.2)
PROT SERPL-MCNC: 6.7 G/DL (ref 6–8.5)
PROT UR QL STRIP: ABNORMAL
RBC # BLD AUTO: 5.29 10*6/MM3 (ref 3.77–5.28)
RBC # UR STRIP: ABNORMAL /HPF
REF LAB TEST METHOD: ABNORMAL
SODIUM SERPL-SCNC: 135 MMOL/L (ref 136–145)
SP GR UR STRIP: 1.02 (ref 1–1.03)
SQUAMOUS #/AREA URNS HPF: ABNORMAL /HPF
UROBILINOGEN UR QL STRIP: ABNORMAL
WBC # UR STRIP: ABNORMAL /HPF
WBC NRBC COR # BLD: 5.8 10*3/MM3 (ref 3.4–10.8)
WHOLE BLOOD HOLD COAG: NORMAL
WHOLE BLOOD HOLD SPECIMEN: NORMAL

## 2022-11-25 PROCEDURE — 74177 CT ABD & PELVIS W/CONTRAST: CPT

## 2022-11-25 PROCEDURE — 80053 COMPREHEN METABOLIC PANEL: CPT

## 2022-11-25 PROCEDURE — 85025 COMPLETE CBC W/AUTO DIFF WBC: CPT

## 2022-11-25 PROCEDURE — 81001 URINALYSIS AUTO W/SCOPE: CPT | Performed by: EMERGENCY MEDICINE

## 2022-11-25 PROCEDURE — 25010000002 KETOROLAC TROMETHAMINE PER 15 MG

## 2022-11-25 PROCEDURE — 36415 COLL VENOUS BLD VENIPUNCTURE: CPT

## 2022-11-25 PROCEDURE — 99284 EMERGENCY DEPT VISIT MOD MDM: CPT

## 2022-11-25 PROCEDURE — 83690 ASSAY OF LIPASE: CPT

## 2022-11-25 PROCEDURE — 25010000002 IOPAMIDOL 61 % SOLUTION: Performed by: EMERGENCY MEDICINE

## 2022-11-25 PROCEDURE — 96375 TX/PRO/DX INJ NEW DRUG ADDON: CPT

## 2022-11-25 PROCEDURE — 25010000002 ONDANSETRON PER 1 MG

## 2022-11-25 PROCEDURE — 87086 URINE CULTURE/COLONY COUNT: CPT

## 2022-11-25 PROCEDURE — 86308 HETEROPHILE ANTIBODY SCREEN: CPT

## 2022-11-25 PROCEDURE — 96374 THER/PROPH/DIAG INJ IV PUSH: CPT

## 2022-11-25 RX ORDER — SODIUM CHLORIDE 0.9 % (FLUSH) 0.9 %
10 SYRINGE (ML) INJECTION AS NEEDED
Status: DISCONTINUED | OUTPATIENT
Start: 2022-11-25 | End: 2022-11-25 | Stop reason: HOSPADM

## 2022-11-25 RX ORDER — ONDANSETRON 2 MG/ML
4 INJECTION INTRAMUSCULAR; INTRAVENOUS ONCE
Status: COMPLETED | OUTPATIENT
Start: 2022-11-25 | End: 2022-11-25

## 2022-11-25 RX ORDER — KETOROLAC TROMETHAMINE 30 MG/ML
15 INJECTION, SOLUTION INTRAMUSCULAR; INTRAVENOUS ONCE
Status: COMPLETED | OUTPATIENT
Start: 2022-11-25 | End: 2022-11-25

## 2022-11-25 RX ADMIN — IOPAMIDOL 100 ML: 612 INJECTION, SOLUTION INTRAVENOUS at 20:40

## 2022-11-25 RX ADMIN — ONDANSETRON 4 MG: 2 INJECTION INTRAMUSCULAR; INTRAVENOUS at 20:16

## 2022-11-25 RX ADMIN — KETOROLAC TROMETHAMINE 15 MG: 30 INJECTION, SOLUTION INTRAMUSCULAR; INTRAVENOUS at 20:15

## 2022-11-25 RX ADMIN — SODIUM CHLORIDE 1000 ML: 9 INJECTION, SOLUTION INTRAVENOUS at 20:27

## 2022-11-26 NOTE — ED PROVIDER NOTES
"Subjective   History of Present Illness  Patient is a 32-year-old female here today with abdominal pain and flank pain.  She started experiencing left upper abdominal pain with some radiation around the left side towards her left flank today.  Has had associated nausea.  Went to urgent care first and was advised to come to the ER concern for pancreatitis.  Patient reports being on a GLP-1 injection for approximately 1 month.  Has also been under a lot of stress lately with a child who was [facility]  Also being worked up for the gastric sleeve and has had a normal ultrasound and HIDA scan of her gallbladder.        Review of Systems   Constitutional: Negative for appetite change, chills and fever.   Gastrointestinal: Positive for abdominal pain and nausea. Negative for abdominal distention, blood in stool, constipation, diarrhea and vomiting.   Genitourinary: Negative for difficulty urinating, dysuria, flank pain, frequency, hematuria and urgency.   Musculoskeletal: Negative for back pain.   All other systems reviewed and are negative.      Past Medical History:   Diagnosis Date   • Anxiety    • Arrhythmia     reported hx of \"arrhythmia\" by PCP. No cardiac eval.   • Asthma     uses albuterol fairly often.   • Bulimia nervosa    • Chronic back pain     PRN tylenol   • Depression    • Diabetes mellitus (HCC)    • Dyspepsia     with greasy foods   • Endometriosis determined by laparoscopy     History of 8-9 laparoscopies Dr. Evgeny Varela   • Heartburn     tums PRN. No egd or h pylori   • History of COVID-19 08/31/2021   • History of uterine fibroid     2013, 2015   • Hypertension 2009    H/O   • Migraine    • Ovarian cyst    • PCOS (polycystic ovarian syndrome)    • Post partum depression 12/29/2016   • Seasonal allergies        Allergies   Allergen Reactions   • Iodine Anaphylaxis   • Latex Anaphylaxis   • Methylene Blue Anaphylaxis   • Mushroom Extract Complex Anaphylaxis "   • Percocet [Oxycodone-Acetaminophen] Anaphylaxis   • Codeine Other (See Comments)   • Shellfish-Derived Products Other (See Comments)     Unsure        Past Surgical History:   Procedure Laterality Date   •  SECTION Bilateral 2017    Procedure:  SECTION REPEAT;  Surgeon: Cheikh Bonilla MD;  Location: [facility] LABOR DELIVERY;  Service:    •  SECTION N/A 10/01/2021    Procedure:  SECTION REPEAT;  Surgeon: Luzmaria Forbes MD;  Location: [facility] LABOR DELIVERY;  Service: Obstetrics/Gynecology;  Laterality: N/A;   • DIAGNOSTIC LAPAROSCOPY      TIMES - for endometriosis; 4194-9834   • MYOMECTOMY  ,        Family History   Problem Relation Age of Onset   • Cancer Mother    • Heart disease Mother    • Hypertension Mother    • Diabetes Mother    • Ovarian cancer Mother 36   • Obesity Mother    • Hypertension Father    • Colon cancer Father 40   • Sleep apnea Father    • Obesity Sister    • Obesity Sister    • Drug abuse Brother    • Hypertension Maternal Grandmother    • Diabetes Maternal Grandmother    • Obesity Maternal Grandmother    • Cancer Paternal Grandmother    • Osteoporosis Paternal Grandmother    • Breast cancer Maternal Aunt 40   • Ovarian cancer Other        Social History     Socioeconomic History   • Marital status:    Tobacco Use   • Smoking status: Never   • Smokeless tobacco: Never   Vaping Use   • Vaping Use: Never used   Substance and Sexual Activity   • Alcohol use: Yes     Alcohol/week: 1.0 standard drink     Types: 1 Glasses of wine per week     Comment: occasional, glass of wine every couple weeks   • Drug use: Never   • Sexual activity: Yes     Partners: Male     Birth control/protection: Tubal ligation           Objective   Physical Exam  Vitals and nursing note reviewed.   Constitutional:       Appearance: Normal appearance. She is normal weight.   HENT:      Head: Normocephalic and atraumatic.   Cardiovascular:      Rate and Rhythm: Normal rate  and regular rhythm.      Pulses: Normal pulses.      Heart sounds: Normal heart sounds.   Pulmonary:      Effort: Pulmonary effort is normal.      Breath sounds: Normal breath sounds.   Abdominal:      General: Abdomen is flat. Bowel sounds are normal. There is no distension.      Palpations: Abdomen is soft.      Tenderness: There is abdominal tenderness in the epigastric area and left upper quadrant. There is left CVA tenderness.   Musculoskeletal:      Right lower leg: No edema.      Left lower leg: No edema.   Skin:     General: Skin is warm and dry.      Capillary Refill: Capillary refill takes less than 2 seconds.   Neurological:      General: No focal deficit present.      Mental Status: She is alert and oriented to person, place, and time.   Psychiatric:         Mood and Affect: Mood normal.         Behavior: Behavior normal.         Procedures           ED Course  ED Course as of 11/25/22 2149 Fri Nov 25, 2022 2005 Glucose: Negative [TA]   2006 Ketones, UA(!): 15 mg/dL (1+) [TA]   2006 Bilirubin, UA: Negative [TA]   2006 Blood, UA(!): Large (3+) [TA]   2006 Protein, UA(!): 30 mg/dL (1+) [TA]   2006 Leukocytes, UA(!): Small (1+) [TA]   2006 Nitrite, UA: Negative [TA]   2006 RBC, UA(!): Too Numerous to Count [TA]   2006 WBC, UA(!): Unable to determine due to loaded field [TA]   2006 Bacteria, UA(!): Unable to determine due to loaded field [TA]   2006 Squamous Epithelial Cells, UA(!): Unable to determine due to loaded field [TA]   2006 Hyaline Casts, UA: Unable to determine due to loaded field [TA]   2020 WBC: 5.80 [TA]   2020 RBC(!): 5.29 [TA]   2020 Hemoglobin: 14.3 [TA]   2020 Hematocrit: 43.4 [TA]   2020 Platelets: 270 [TA]   2057 Lipase: 19 [TA]   2057 Glucose(!): 104 [TA]   2057 BUN: 8 [TA]   2057 Creatinine: 0.71 [TA]   2057 Sodium(!): 135 [TA]   2057 Potassium(!): 3.2 [TA]   2112 Monospot: Negative [TA]   2148 CT Abdomen Pelvis With Contrast  FINDINGS:  Abdomen: The gallbladder is normal.  The  solid abdominal organs  and ureters are unremarkable.  The GI tract is unremarkable,  with no sign of appendicitis.  Pelvis: The uterus, ovaries and  urinary bladder are normal.    There is no pelvic or abdominal  ascites, adenopathy or acute osseous abnormality.     IMPRESSION:  Unremarkable. [TA]      ED Course User Index  [TA] Brennan Edmonds YAIMA, APRN                                           MDM  Number of Diagnoses or Management Options  Diagnosis management comments: Patient is a 32-year-old female here today with abdominal pain and flank pain.  She does not appear to be in acute distress and vital signs are within normal limits.  Labs show a bloody urine, but the patient is currently on her period and denies any urinary symptoms.  Her blood work is normal including a normal white blood count and a negative Monospot.  His CT abdomen and pelvis was ordered to evaluate for possible pancreatitis, this was also negative per radiologist report.  This correlates with the patient's normal lipase.  Discussed the results with Dr. Leyva.  Discussed results with the patient and the plan of care.  Her abdominal pain may be due to stress or possible underlying gastric ulcer.  She does state that she had a normal EGD recently, but may have gastric irritation related to the stress of her recently sick child.  Advised her that she could use over-the-counter Prilosec or Pepcid to help see if this improves her abdominal pain.  Otherwise she can continue her other medication as normal.  Recommend a follow-up with her primary provider for reevaluation or she can return to the ER for any new or worsening symptoms.       Amount and/or Complexity of Data Reviewed  Clinical lab tests: reviewed and ordered  Tests in the radiology section of CPT®: ordered and reviewed  Tests in the medicine section of CPT®: ordered and reviewed  Discussion of test results with the performing providers: yes  Discuss the patient with other providers:  yes    Patient Progress  Patient progress: stable      Final diagnoses:   Left upper quadrant abdominal pain       ED Disposition  ED Disposition     ED Disposition   Discharge    Condition   Stable    Comment   --             System, Provider Not In  Psychiatric [id number]    Schedule an appointment as soon as possible for a visit   As needed         Medication List      No changes were made to your prescriptions during this visit.          Brennan Edmonds, APRN  11/25/22 [id number]

## 2022-11-26 NOTE — DISCHARGE INSTRUCTIONS
Negative work-up today, recommend a follow-up with your primary provider for reevaluation.  May benefit from using over-the-counter Pepcid or Prilosec to help with your stomach if gastric irritation may be the culprit of your symptoms.  Otherwise, return to the ER for any new or worsening symptoms.

## 2022-11-27 LAB — BACTERIA SPEC AEROBE CULT: NORMAL

## 2022-11-30 ENCOUNTER — APPOINTMENT (OUTPATIENT)
Dept: CT IMAGING | Facility: HOSPITAL | Age: 32
End: 2022-11-30

## 2022-11-30 ENCOUNTER — HOSPITAL ENCOUNTER (EMERGENCY)
Facility: HOSPITAL | Age: 32
Discharge: HOME | End: 2022-11-30
Attending: EMERGENCY MEDICINE | Admitting: EMERGENCY MEDICINE

## 2022-11-30 ENCOUNTER — APPOINTMENT (OUTPATIENT)
Dept: GENERAL RADIOLOGY | Facility: HOSPITAL | Age: 32
End: 2022-11-30

## 2022-11-30 VITALS
WEIGHT: 233 LBS | BODY MASS INDEX: 39.78 KG/M2 | OXYGEN SATURATION: 100 % | HEART RATE: 96 BPM | SYSTOLIC BLOOD PRESSURE: 129 MMHG | TEMPERATURE: 98 F | DIASTOLIC BLOOD PRESSURE: 86 MMHG | HEIGHT: 64 IN | RESPIRATION RATE: 18 BRPM

## 2022-11-30 DIAGNOSIS — S16.1XXA STRAIN OF NECK MUSCLE, INITIAL ENCOUNTER: ICD-10-CM

## 2022-11-30 DIAGNOSIS — S09.90XA INJURY OF HEAD, INITIAL ENCOUNTER: Primary | ICD-10-CM

## 2022-11-30 DIAGNOSIS — S39.012A STRAIN OF LUMBAR REGION, INITIAL ENCOUNTER: ICD-10-CM

## 2022-11-30 DIAGNOSIS — S29.019A THORACIC MYOFASCIAL STRAIN, INITIAL ENCOUNTER: ICD-10-CM

## 2022-11-30 PROCEDURE — 72131 CT LUMBAR SPINE W/O DYE: CPT

## 2022-11-30 PROCEDURE — 70450 CT HEAD/BRAIN W/O DYE: CPT

## 2022-11-30 PROCEDURE — 72128 CT CHEST SPINE W/O DYE: CPT

## 2022-11-30 PROCEDURE — 99284 EMERGENCY DEPT VISIT MOD MDM: CPT

## 2022-11-30 PROCEDURE — 72125 CT NECK SPINE W/O DYE: CPT

## 2022-11-30 PROCEDURE — 71045 X-RAY EXAM CHEST 1 VIEW: CPT

## 2022-11-30 RX ORDER — CYCLOBENZAPRINE HCL 10 MG
10 TABLET ORAL 3 TIMES DAILY PRN
Qty: 21 TABLET | Refills: 0 | Status: SHIPPED | OUTPATIENT
Start: 2022-11-30

## 2022-11-30 RX ORDER — ACETAMINOPHEN 325 MG/1
975 TABLET ORAL ONCE
Status: COMPLETED | OUTPATIENT
Start: 2022-11-30 | End: 2022-11-30

## 2022-11-30 RX ADMIN — ACETAMINOPHEN 975 MG: 325 TABLET, FILM COATED ORAL at 18:51

## 2022-12-13 ENCOUNTER — TELEMEDICINE (OUTPATIENT)
Dept: BARIATRICS/WEIGHT MGMT | Facility: CLINIC | Age: 32
End: 2022-12-13

## 2022-12-13 DIAGNOSIS — E66.01 OBESITY, CLASS III, BMI 40-49.9 (MORBID OBESITY): Primary | ICD-10-CM

## 2022-12-13 PROCEDURE — 99212 OFFICE O/P EST SF 10 MIN: CPT | Performed by: PHYSICIAN ASSISTANT
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
## 2022-12-29 DIAGNOSIS — R06.00 DYSPNEA, UNSPECIFIED TYPE: ICD-10-CM

## 2022-12-29 DIAGNOSIS — R53.83 FATIGUE, UNSPECIFIED TYPE: Primary | ICD-10-CM

## 2023-03-20 ENCOUNTER — HOSPITAL ENCOUNTER (EMERGENCY)
Facility: HOSPITAL | Age: 33
Discharge: HOME | End: 2023-03-20
Attending: EMERGENCY MEDICINE | Admitting: EMERGENCY MEDICINE
Payer: COMMERCIAL

## 2023-03-20 VITALS
SYSTOLIC BLOOD PRESSURE: 121 MMHG | BODY MASS INDEX: 34.89 KG/M2 | DIASTOLIC BLOOD PRESSURE: 78 MMHG | OXYGEN SATURATION: 99 % | HEIGHT: 64 IN | TEMPERATURE: 98 F | WEIGHT: 204.4 LBS | HEART RATE: 91 BPM | RESPIRATION RATE: 16 BRPM

## 2023-03-20 DIAGNOSIS — E86.0 MILD DEHYDRATION: ICD-10-CM

## 2023-03-20 DIAGNOSIS — R11.2 NAUSEA AND VOMITING, UNSPECIFIED VOMITING TYPE: Primary | ICD-10-CM

## 2023-03-20 LAB
ALBUMIN SERPL-MCNC: 4.3 G/DL (ref 3.5–5.2)
ALBUMIN/GLOB SERPL: 1.4 G/DL
ALP SERPL-CCNC: 76 U/L (ref 39–117)
ALT SERPL W P-5'-P-CCNC: 17 U/L (ref 1–33)
ANION GAP SERPL CALCULATED.3IONS-SCNC: 12.1 MMOL/L (ref 5–15)
AST SERPL-CCNC: 16 U/L (ref 1–32)
BACTERIA UR QL AUTO: ABNORMAL /HPF
BASOPHILS # BLD AUTO: 0.03 10*3/MM3 (ref 0–0.2)
BASOPHILS NFR BLD AUTO: 0.3 % (ref 0–1.5)
BILIRUB SERPL-MCNC: 0.5 MG/DL (ref 0–1.2)
BILIRUB UR QL STRIP: NEGATIVE
BUN SERPL-MCNC: 6 MG/DL (ref 6–20)
BUN/CREAT SERPL: 9.2 (ref 7–25)
CALCIUM SPEC-SCNC: 9.8 MG/DL (ref 8.6–10.5)
CHLORIDE SERPL-SCNC: 96 MMOL/L (ref 98–107)
CLARITY UR: CLEAR
CO2 SERPL-SCNC: 26.9 MMOL/L (ref 22–29)
COLOR UR: YELLOW
CREAT SERPL-MCNC: 0.65 MG/DL (ref 0.57–1)
DEPRECATED RDW RBC AUTO: 40.1 FL (ref 37–54)
EGFRCR SERPLBLD CKD-EPI 2021: 119.4 ML/MIN/1.73
EOSINOPHIL # BLD AUTO: 0.08 10*3/MM3 (ref 0–0.4)
EOSINOPHIL NFR BLD AUTO: 0.8 % (ref 0.3–6.2)
ERYTHROCYTE [DISTWIDTH] IN BLOOD BY AUTOMATED COUNT: 13.1 % (ref 12.3–15.4)
GLOBULIN UR ELPH-MCNC: 3.1 GM/DL
GLUCOSE SERPL-MCNC: 83 MG/DL (ref 65–99)
GLUCOSE UR STRIP-MCNC: NEGATIVE MG/DL
HCT VFR BLD AUTO: 41.4 % (ref 34–46.6)
HGB BLD-MCNC: 13.4 G/DL (ref 12–15.9)
HGB UR QL STRIP.AUTO: NEGATIVE
HOLD SPECIMEN: NORMAL
HOLD SPECIMEN: NORMAL
HYALINE CASTS UR QL AUTO: ABNORMAL /LPF
IMM GRANULOCYTES # BLD AUTO: 0.02 10*3/MM3 (ref 0–0.05)
IMM GRANULOCYTES NFR BLD AUTO: 0.2 % (ref 0–0.5)
KETONES UR QL STRIP: ABNORMAL
LEUKOCYTE ESTERASE UR QL STRIP.AUTO: ABNORMAL
LIPASE SERPL-CCNC: 29 U/L (ref 13–60)
LYMPHOCYTES # BLD AUTO: 2.62 10*3/MM3 (ref 0.7–3.1)
LYMPHOCYTES NFR BLD AUTO: 26.9 % (ref 19.6–45.3)
MCH RBC QN AUTO: 27 PG (ref 26.6–33)
MCHC RBC AUTO-ENTMCNC: 32.4 G/DL (ref 31.5–35.7)
MCV RBC AUTO: 83.5 FL (ref 79–97)
MONOCYTES # BLD AUTO: 0.66 10*3/MM3 (ref 0.1–0.9)
MONOCYTES NFR BLD AUTO: 6.8 % (ref 5–12)
MUCOUS THREADS URNS QL MICRO: ABNORMAL /HPF
NEUTROPHILS NFR BLD AUTO: 6.33 10*3/MM3 (ref 1.7–7)
NEUTROPHILS NFR BLD AUTO: 65 % (ref 42.7–76)
NITRITE UR QL STRIP: NEGATIVE
NRBC BLD AUTO-RTO: 0 /100 WBC (ref 0–0.2)
PH UR STRIP.AUTO: 6.5 [PH] (ref 5–8)
PLATELET # BLD AUTO: 295 10*3/MM3 (ref 140–450)
PMV BLD AUTO: 8.6 FL (ref 6–12)
POTASSIUM SERPL-SCNC: 3.4 MMOL/L (ref 3.5–5.2)
PROT SERPL-MCNC: 7.4 G/DL (ref 6–8.5)
PROT UR QL STRIP: NEGATIVE
RBC # BLD AUTO: 4.96 10*6/MM3 (ref 3.77–5.28)
RBC # UR STRIP: ABNORMAL /HPF
REF LAB TEST METHOD: ABNORMAL
SODIUM SERPL-SCNC: 135 MMOL/L (ref 136–145)
SP GR UR STRIP: 1.02 (ref 1–1.03)
SQUAMOUS #/AREA URNS HPF: ABNORMAL /HPF
UROBILINOGEN UR QL STRIP: ABNORMAL
WBC # UR STRIP: ABNORMAL /HPF
WBC NRBC COR # BLD: 9.74 10*3/MM3 (ref 3.4–10.8)
WHOLE BLOOD HOLD COAG: NORMAL
WHOLE BLOOD HOLD SPECIMEN: NORMAL

## 2023-03-20 PROCEDURE — 85025 COMPLETE CBC W/AUTO DIFF WBC: CPT | Performed by: EMERGENCY MEDICINE

## 2023-03-20 PROCEDURE — 96374 THER/PROPH/DIAG INJ IV PUSH: CPT

## 2023-03-20 PROCEDURE — 80053 COMPREHEN METABOLIC PANEL: CPT | Performed by: EMERGENCY MEDICINE

## 2023-03-20 PROCEDURE — 25010000002 ONDANSETRON PER 1 MG: Performed by: EMERGENCY MEDICINE

## 2023-03-20 PROCEDURE — 99283 EMERGENCY DEPT VISIT LOW MDM: CPT

## 2023-03-20 PROCEDURE — 81001 URINALYSIS AUTO W/SCOPE: CPT | Performed by: EMERGENCY MEDICINE

## 2023-03-20 PROCEDURE — 83690 ASSAY OF LIPASE: CPT | Performed by: EMERGENCY MEDICINE

## 2023-03-20 RX ORDER — SODIUM CHLORIDE 0.9 % (FLUSH) 0.9 %
10 SYRINGE (ML) INJECTION AS NEEDED
Status: DISCONTINUED | OUTPATIENT
Start: 2023-03-20 | End: 2023-03-21 | Stop reason: HOSPADM

## 2023-03-20 RX ORDER — FLUOXETINE HYDROCHLORIDE 20 MG/1
40 CAPSULE ORAL DAILY
COMMUNITY

## 2023-03-20 RX ORDER — ONDANSETRON 2 MG/ML
4 INJECTION INTRAMUSCULAR; INTRAVENOUS ONCE
Status: COMPLETED | OUTPATIENT
Start: 2023-03-20 | End: 2023-03-20

## 2023-03-20 RX ORDER — SEMAGLUTIDE 1.34 MG/ML
2 INJECTION, SOLUTION SUBCUTANEOUS WEEKLY
COMMUNITY

## 2023-03-20 RX ADMIN — ONDANSETRON 4 MG: 2 INJECTION INTRAMUSCULAR; INTRAVENOUS at 21:15

## 2023-03-20 RX ADMIN — SODIUM CHLORIDE 1000 ML: 9 INJECTION, SOLUTION INTRAVENOUS at 21:17

## 2023-03-24 NOTE — ED PROVIDER NOTES
"Subjective   History of Present Illness  Chief Complaint: Upper abdominal pain nausea vomiting, concern for pancreatitis  History of Present Illness: 32-year-old female here for evaluation above complaint, recently started Ozempic after changing her treatment regimen, is concerned she may be having a side effect from the medication  Associated symptoms: None        Nurses Notes reviewed and agree, including vitals, allergies, social history and prior medical history.     REVIEW OF SYSTEMS: 14 point review of systems reviewed and not pertinent unless noted.    Positive for: Nausea vomiting left lateral abdominal pain    Negative for: Fever cough hemoptysis syncope flank pain diarrhea GI bleeding urinary symptoms      Review of Systems    Past Medical History:   Diagnosis Date   • Anxiety    • Arrhythmia     reported hx of \"arrhythmia\" by PCP. No cardiac eval.   • Asthma     uses albuterol fairly often.   • Bulimia nervosa    • Chronic back pain     PRN tylenol   • Depression    • Diabetes mellitus (HCC)    • Dyspepsia     with greasy foods   • Endometriosis determined by laparoscopy     History of 8-9 laparoscopies Dr. Evgeny Varela   • Heartburn     tums PRN. No egd or h pylori   • History of COVID-19 2021   • History of uterine fibroid     ,    • Hypertension     H/O   • Migraine    • Ovarian cyst    • PCOS (polycystic ovarian syndrome)    • Post partum depression 2016   • Seasonal allergies        Allergies   Allergen Reactions   • Iodine Anaphylaxis   • Latex Anaphylaxis   • Methylene Blue Anaphylaxis   • Mushroom Extract Complex Anaphylaxis   • Percocet [Oxycodone-Acetaminophen] Anaphylaxis   • Codeine Other (See Comments)   • Shellfish-Derived Products Other (See Comments)     Unsure        Past Surgical History:   Procedure Laterality Date   •  SECTION Bilateral 2017    Procedure:  SECTION REPEAT;  Surgeon: Cheikh Bonilla MD;  Location: [facility] LABOR DELIVERY;  " "Service:    •  SECTION N/A 10/01/2021    Procedure:  SECTION REPEAT;  Surgeon: Luzmaria Forbes MD;  [facility];  Service: Obstetrics/Gynecology;  Laterality: N/A;   • DIAGNOSTIC LAPAROSCOPY      TIMES 8-9 for endometriosis; 5024-3553   • MYOMECTOMY  ,        Family History   Problem Relation Age of Onset   • Cancer Mother    • Heart disease Mother    • Hypertension Mother    • Diabetes Mother    • Ovarian cancer Mother 36   • Obesity Mother    • Hypertension Father    • Colon cancer Father 40   • Sleep apnea Father    • Obesity Sister    • Obesity Sister    • Drug abuse Brother    • Hypertension Maternal Grandmother    • Diabetes Maternal Grandmother    • Obesity Maternal Grandmother    • Cancer Paternal Grandmother    • Osteoporosis Paternal Grandmother    • Breast cancer Maternal Aunt 40   • Ovarian cancer Other        Social History     Socioeconomic History   • Marital status:    Tobacco Use   • Smoking status: Never   • Smokeless tobacco: Never   Vaping Use   • Vaping Use: Never used   Substance and Sexual Activity   • Alcohol use: Yes     Alcohol/week: 1.0 standard drink     Types: 1 Glasses of wine per week     Comment: occasional, glass of wine every couple weeks   • Drug use: Never   • Sexual activity: Yes     Partners: Male     Birth control/protection: Tubal ligation           Objective   Physical Exam  /78 (BP Location: Left arm, Patient Position: Sitting)   Pulse 91   Temp 98 °F (36.7 °C) (Oral)   Resp 16   Ht 162.6 cm (64\")   Wt 92.7 kg (204 lb 6.4 oz)   LMP 2023 (Exact Date)   SpO2 99%   BMI 35.09 kg/m²      GENERAL APPEARANCE: Well developed, well nourished, healthy appearing nontoxic 32-year-old female, in no acute distress.  VITAL SIGNS: per nursing, reviewed and noted  SKIN: no exposed rashes, ulcerations or petechiae.  EYES: Grossly EOMI, no icterus.   ENT:  Normal voice, patient maintained wearing a mask throughout patient " encounter due to coronavirus pandemic.   PULMONARY:   No retractions. No increased work of breathing.   CARDIOVASCULAR:  regular rate and rhythm, no murmurs.  Good Peripheral pulses. Good cap refill. extremities pink and warm.  GASTROINTESTINAL:  Abdomen soft, left upper quadrant lateral abdominal tenderness palpation without rebound tenderness or guarding, normal bowel sounds. No hernia. No ascites.  MUSCULOSKELETAL: Age appropriate bulk and tone.  Strength and tone grossly normal.  NEUROLOGIC: Alert, oriented x 3. No gross deficits. GCS 15.   PSYCH: appropriate affect  : no bladder tenderness or distention, no CVA tenderness  Procedures           ED Course                                           MDM    Initial impression of presenting illness: 32-year-old female presents for evaluation of nausea vomiting, left upper quadrant abdominal    DDX: includes but is not limited to: Adverse reaction to medication, GERD, pancreatitis, gastroenteritis, muscle sprain    Patient arrives normotensive afebrile no tachycardia oxygen saturations 99% room air with vitals interpreted by myself.     Pertinent features from physical exam: Left upper lateral mild abdominal tenderness below the costal.    Initial diagnostic plan: IV fluids antiemetics, labs    Results from initial plan were reviewed and interpreted by me revealing normal white cell count nonactionable CBC or CMP mild ketonuria without UTI, normal lipase    Diagnostic information from other sources: None    Interventions / Re-evaluation: Better after interventions    Results/clinical rationale were discussed with patient    Consultations/Discussion of results with other physicians: None    Disposition plan: Patient will be discharged home in stable condition.  Supportive care recommendations were discussed.  Outpatient follow-up, return precautions were discussed.  Patient has adequate antiemetics at home    Final diagnoses:   Nausea and vomiting, unspecified  vomiting type   Mild dehydration       ED Disposition  ED Disposition     ED Disposition   Discharge    Condition   Stable    Comment   --             Provider, No Known  [facility] [address] [phone]          [facility] Emergency Department  [address] [place] [address] [phone]    As needed, If symptoms worsen         Medication List      No changes were made to your prescriptions during this visit.          Reid Pierson, DO  03/23/23 1354

## 2023-05-08 ENCOUNTER — PREP FOR SURGERY (OUTPATIENT)
Dept: OTHER | Facility: HOSPITAL | Age: 33
End: 2023-05-08
Payer: COMMERCIAL

## 2023-05-08 DIAGNOSIS — N93.9 ABNORMAL UTERINE BLEEDING (AUB): Primary | ICD-10-CM

## 2023-05-08 PROBLEM — D21.9 FIBROIDS: Status: ACTIVE | Noted: 2023-05-08

## 2023-05-08 RX ORDER — SCOLOPAMINE TRANSDERMAL SYSTEM 1 MG/1
1 PATCH, EXTENDED RELEASE TRANSDERMAL CONTINUOUS
OUTPATIENT
Start: 2023-05-08 | End: 2023-05-11

## 2023-05-08 RX ORDER — SODIUM CHLORIDE 0.9 % (FLUSH) 0.9 %
10 SYRINGE (ML) INJECTION AS NEEDED
OUTPATIENT
Start: 2023-05-08

## 2023-05-08 RX ORDER — SODIUM CHLORIDE 0.9 % (FLUSH) 0.9 %
3 SYRINGE (ML) INJECTION EVERY 12 HOURS SCHEDULED
OUTPATIENT
Start: 2023-05-08

## 2023-05-08 RX ORDER — ACETAMINOPHEN 500 MG
1000 TABLET ORAL ONCE
OUTPATIENT
Start: 2023-05-08 | End: 2023-05-08

## 2023-05-08 RX ORDER — SODIUM CHLORIDE 9 MG/ML
40 INJECTION, SOLUTION INTRAVENOUS AS NEEDED
OUTPATIENT
Start: 2023-05-08

## 2023-05-08 RX ORDER — GABAPENTIN 300 MG/1
600 CAPSULE ORAL ONCE
OUTPATIENT
Start: 2023-05-08 | End: 2023-05-08

## 2023-06-06 ENCOUNTER — HOSPITAL ENCOUNTER (EMERGENCY)
Facility: HOSPITAL | Age: 33
Discharge: HOME | End: 2023-06-06
Attending: EMERGENCY MEDICINE | Admitting: EMERGENCY MEDICINE
Payer: COMMERCIAL

## 2023-06-06 VITALS
BODY MASS INDEX: 29.37 KG/M2 | DIASTOLIC BLOOD PRESSURE: 83 MMHG | SYSTOLIC BLOOD PRESSURE: 124 MMHG | TEMPERATURE: 98.1 F | HEIGHT: 64 IN | HEART RATE: 81 BPM | OXYGEN SATURATION: 100 % | WEIGHT: 172 LBS | RESPIRATION RATE: 16 BRPM

## 2023-06-06 DIAGNOSIS — N93.9 ABNORMAL UTERINE BLEEDING: Primary | ICD-10-CM

## 2023-06-06 LAB
ALBUMIN SERPL-MCNC: 4 G/DL (ref 3.5–5.2)
ALBUMIN/GLOB SERPL: 1.4 G/DL
ALP SERPL-CCNC: 63 U/L (ref 39–117)
ALT SERPL W P-5'-P-CCNC: 14 U/L (ref 1–33)
ANION GAP SERPL CALCULATED.3IONS-SCNC: 11.4 MMOL/L (ref 5–15)
APTT PPP: 31.6 SECONDS (ref 70–100)
AST SERPL-CCNC: 13 U/L (ref 1–32)
B-HCG UR QL: NEGATIVE
BASOPHILS # BLD AUTO: 0.03 10*3/MM3 (ref 0–0.2)
BASOPHILS NFR BLD AUTO: 0.5 % (ref 0–1.5)
BILIRUB SERPL-MCNC: 0.3 MG/DL (ref 0–1.2)
BUN SERPL-MCNC: 5 MG/DL (ref 6–20)
BUN/CREAT SERPL: 7.2 (ref 7–25)
CALCIUM SPEC-SCNC: 9.6 MG/DL (ref 8.6–10.5)
CHLORIDE SERPL-SCNC: 102 MMOL/L (ref 98–107)
CO2 SERPL-SCNC: 25.6 MMOL/L (ref 22–29)
CREAT SERPL-MCNC: 0.69 MG/DL (ref 0.57–1)
DEPRECATED RDW RBC AUTO: 41.1 FL (ref 37–54)
EGFRCR SERPLBLD CKD-EPI 2021: 117.7 ML/MIN/1.73
EOSINOPHIL # BLD AUTO: 0.09 10*3/MM3 (ref 0–0.4)
EOSINOPHIL NFR BLD AUTO: 1.5 % (ref 0.3–6.2)
ERYTHROCYTE [DISTWIDTH] IN BLOOD BY AUTOMATED COUNT: 13.4 % (ref 12.3–15.4)
GLOBULIN UR ELPH-MCNC: 2.8 GM/DL
GLUCOSE SERPL-MCNC: 88 MG/DL (ref 65–99)
HCT VFR BLD AUTO: 36.7 % (ref 34–46.6)
HGB BLD-MCNC: 12 G/DL (ref 12–15.9)
IMM GRANULOCYTES # BLD AUTO: 0 10*3/MM3 (ref 0–0.05)
IMM GRANULOCYTES NFR BLD AUTO: 0 % (ref 0–0.5)
INR PPP: 1.02 (ref 0.9–1.1)
LYMPHOCYTES # BLD AUTO: 2.04 10*3/MM3 (ref 0.7–3.1)
LYMPHOCYTES NFR BLD AUTO: 33.9 % (ref 19.6–45.3)
MCH RBC QN AUTO: 27.4 PG (ref 26.6–33)
MCHC RBC AUTO-ENTMCNC: 32.7 G/DL (ref 31.5–35.7)
MCV RBC AUTO: 83.8 FL (ref 79–97)
MONOCYTES # BLD AUTO: 0.46 10*3/MM3 (ref 0.1–0.9)
MONOCYTES NFR BLD AUTO: 7.6 % (ref 5–12)
NEUTROPHILS NFR BLD AUTO: 3.4 10*3/MM3 (ref 1.7–7)
NEUTROPHILS NFR BLD AUTO: 56.5 % (ref 42.7–76)
NRBC BLD AUTO-RTO: 0 /100 WBC (ref 0–0.2)
PLATELET # BLD AUTO: 272 10*3/MM3 (ref 140–450)
PMV BLD AUTO: 9.1 FL (ref 6–12)
POTASSIUM SERPL-SCNC: 3.1 MMOL/L (ref 3.5–5.2)
PROT SERPL-MCNC: 6.8 G/DL (ref 6–8.5)
PROTHROMBIN TIME: 13.9 SECONDS (ref 12.3–15.1)
RBC # BLD AUTO: 4.38 10*6/MM3 (ref 3.77–5.28)
SODIUM SERPL-SCNC: 139 MMOL/L (ref 136–145)
WBC NRBC COR # BLD: 6.02 10*3/MM3 (ref 3.4–10.8)

## 2023-06-06 PROCEDURE — 81025 URINE PREGNANCY TEST: CPT | Performed by: EMERGENCY MEDICINE

## 2023-06-06 PROCEDURE — 96375 TX/PRO/DX INJ NEW DRUG ADDON: CPT

## 2023-06-06 PROCEDURE — 85025 COMPLETE CBC W/AUTO DIFF WBC: CPT | Performed by: EMERGENCY MEDICINE

## 2023-06-06 PROCEDURE — 36415 COLL VENOUS BLD VENIPUNCTURE: CPT

## 2023-06-06 PROCEDURE — 99283 EMERGENCY DEPT VISIT LOW MDM: CPT

## 2023-06-06 PROCEDURE — 80053 COMPREHEN METABOLIC PANEL: CPT | Performed by: EMERGENCY MEDICINE

## 2023-06-06 PROCEDURE — 85730 THROMBOPLASTIN TIME PARTIAL: CPT | Performed by: EMERGENCY MEDICINE

## 2023-06-06 PROCEDURE — 25010000002 ONDANSETRON PER 1 MG: Performed by: EMERGENCY MEDICINE

## 2023-06-06 PROCEDURE — 96374 THER/PROPH/DIAG INJ IV PUSH: CPT

## 2023-06-06 PROCEDURE — 85610 PROTHROMBIN TIME: CPT | Performed by: EMERGENCY MEDICINE

## 2023-06-06 PROCEDURE — 25010000002 KETOROLAC TROMETHAMINE PER 15 MG: Performed by: EMERGENCY MEDICINE

## 2023-06-06 RX ORDER — ONDANSETRON 2 MG/ML
4 INJECTION INTRAMUSCULAR; INTRAVENOUS ONCE
Status: COMPLETED | OUTPATIENT
Start: 2023-06-06 | End: 2023-06-06

## 2023-06-06 RX ORDER — POTASSIUM CHLORIDE 750 MG/1
40 CAPSULE, EXTENDED RELEASE ORAL ONCE
Status: COMPLETED | OUTPATIENT
Start: 2023-06-06 | End: 2023-06-06

## 2023-06-06 RX ORDER — KETOROLAC TROMETHAMINE 30 MG/ML
15 INJECTION, SOLUTION INTRAMUSCULAR; INTRAVENOUS ONCE
Status: COMPLETED | OUTPATIENT
Start: 2023-06-06 | End: 2023-06-06

## 2023-06-06 RX ADMIN — POTASSIUM CHLORIDE 40 MEQ: 10 CAPSULE, COATED, EXTENDED RELEASE ORAL at 22:08

## 2023-06-06 RX ADMIN — KETOROLAC TROMETHAMINE 15 MG: 30 INJECTION, SOLUTION INTRAMUSCULAR; INTRAVENOUS at 21:14

## 2023-06-06 RX ADMIN — SODIUM CHLORIDE 1000 ML: 9 INJECTION, SOLUTION INTRAVENOUS at 21:12

## 2023-06-06 RX ADMIN — ONDANSETRON 4 MG: 2 INJECTION INTRAMUSCULAR; INTRAVENOUS at 21:13

## 2023-06-06 NOTE — Clinical Note
[facility] EMERGENCY DEPARTMENT  [address] [facility] [address]  Phone: [phone]    Gunjan Engel was seen and treated in our emergency department on 6/6/2023.  She may return to work on 06/08/2023.         Thank you for choosing [facility].    Evgeny Leyva MD       Normal vision: sees adequately in most situations; can see medication labels, newsprint

## 2023-06-07 NOTE — ED PROVIDER NOTES
"  HPI: Gunjan Engel is a 32 y.o. female who presents to the emergency department complaining of vaginal bleeding.  She states after last several hours she has had heavy vaginal bleeding.  She notes a history of endometriosis and PCOS.  States that she usually has issues with bleeding but never this heavy or severe.  Has some lower abdominal cramping.  Some nausea and lightheadedness.  No fevers, vomiting or other complaints.      REVIEW OF SYSTEMS: All other systems reviewed and are negative     PAST MEDICAL HISTORY:   Past Medical History:   Diagnosis Date    Anxiety     Arrhythmia     reported hx of \"arrhythmia\" by PCP. No cardiac eval.    Asthma     uses albuterol fairly often.    Bulimia nervosa     Chronic back pain     PRN tylenol    Depression     Diabetes mellitus     Dyspepsia     with greasy foods    Endometriosis determined by laparoscopy     History of 8-9 laparoscopies Dr. Evgeny Houser     tums PRN. No egd or h pylori    History of COVID-19 08/31/2021    History of uterine fibroid     2013, 2015    Hypertension 2009    H/O    Migraine     Ovarian cyst     PCOS (polycystic ovarian syndrome)     Post partum depression 12/29/2016    Seasonal allergies         FAMILY HISTORY:   Family History   Problem Relation Age of Onset    Cancer Mother     Heart disease Mother     Hypertension Mother     Diabetes Mother     Ovarian cancer Mother 36    Obesity Mother     Hypertension Father     Colon cancer Father 40    Sleep apnea Father     Obesity Sister     Obesity Sister     Drug abuse Brother     Hypertension Maternal Grandmother     Diabetes Maternal Grandmother     Obesity Maternal Grandmother     Cancer Paternal Grandmother     Osteoporosis Paternal Grandmother     Breast cancer Maternal Aunt 40    Ovarian cancer Other         SOCIAL HISTORY:   Social History     Socioeconomic History    Marital status:    Tobacco Use    Smoking status: Never    Smokeless tobacco: Never   Vaping Use "    Vaping Use: Never used   Substance and Sexual Activity    Alcohol use: Yes     Alcohol/week: 1.0 standard drink     Types: 1 Glasses of wine per week     Comment: occasional, glass of wine every couple weeks    Drug use: Never    Sexual activity: Yes     Partners: Male     Birth control/protection: Tubal ligation        SURGICAL HISTORY:   Past Surgical History:   Procedure Laterality Date     SECTION Bilateral 2017    Procedure:  SECTION REPEAT;  Surgeon: Cheikh Bonilla MD;  Location: [facility] LABOR DELIVERY;  Service:      SECTION N/A 10/01/2021    Procedure:  SECTION REPEAT;  Surgeon: Luzmaria Forbes MD;  Location: [facility] LABOR DELIVERY;  Service: Obstetrics/Gynecology;  Laterality: N/A;    DIAGNOSTIC LAPAROSCOPY      TIMES - for endometriosis; 9535-4563    MYOMECTOMY  ,         ALLERGIES: Iodine, Latex, Methylene blue, Mushroom extract complex, Percocet [oxycodone-acetaminophen], Codeine, and Shellfish-derived products       PHYSICAL EXAM:   VITAL SIGNS:   Vitals:    23 1946   BP: 124/83   Pulse: 81   Resp: 16   Temp: 98.1 °F (36.7 °C)   SpO2: 100%      CONSTITUTIONAL: Awake, well appearing, nontoxic   HENT: Atraumatic, normocephalic, oral mucosa moist, airway patent. Nares patent without drainage. External ears normal.   EYES: Conjunctivae clear, EOMI, PERRL   NECK: Trachea midline, nontender, supple   CARDIOVASCULAR: Normal heart rate, Normal rhythm.  PULMONARY/CHEST: Normal work of breathing. Clear to auscultation, no rhonchi, wheezes, or rales.  ABDOMINAL: Nondistended, soft, nontender, no rebound or guarding.  NEUROLOGIC: Nonfocal, moves all four extremities, no gross sensory or motor deficits.   EXTREMITIES: No clubbing, cyanosis, or edema   SKIN: Warm, Dry, No erythema, No rash       ED COURSE / MEDICAL DECISION MAKING:     Gunjan Engel is a 32 y.o. female who presents to the emergency department for evaluation of vaginal bleeding.  Well-developed,  well-nourished female in no distress with exam as above.  Her vital signs are normal.  Her oxygen saturation is normal on room air 100%.  Her abdominal exam is benign.  Will obtain basic labs and give symptomatic treatment.  Do not feel imaging is indicated at this point.  Disposition pending.    Differential diagnosis includes normal uterine bleeding, anemia, dehydration, electrolyte derangement among other etiologies.    Lab work is nonactionable.  She is resting comfortably.  I do feel she is safe for discharge home at this time.  Advised close follow-up with GYN.  She is agreeable with plan of care.    Final diagnoses:   Abnormal uterine bleeding        Evgeny Leyva MD  06/06/23 6815

## 2023-08-03 ENCOUNTER — TELEPHONE (OUTPATIENT)
Dept: GENETICS | Facility: HOSPITAL | Age: 33
End: 2023-08-03
Payer: COMMERCIAL

## 2023-08-10 NOTE — PROGRESS NOTES
OB FOLLOW UP  CC- Here for care of pregnancy        Gunjan Engel is a 30 y.o.  22w5d patient being seen today for her obstetrical follow up visit. Patient reports Cramping .     Her prenatal care is complicated by (and status) :    Previous  section, microdeletion with first child, history of PIH, severe post partum depression   Patient Active Problem List   Diagnosis   • S/P myomectomy   • Endometriosis determined by laparoscopy   • Anxiety   • Morbid obesity with BMI of 40.0-44.9, adult (CMS/[facility])         Ultrasound Today: no     ROS -   Patient Reports : cramping   Patient Denies: Loss of Fluid  Fetal Movement : normal  All other systems reviewed and are negative.       The additional following portions of the patient's history were reviewed and updated as appropriate: allergies, current medications, past family history, past medical history, past social history, past surgical history and problem list.    I have reviewed and agree with the HPI, ROS, and historical information as entered above. Marissa Hernandez, APRN    /88   Wt 113 kg (250 lb)   LMP 2020   BMI 42.91 kg/m²       EXAM:     FHT: wnl BPM   Uterine Size: appropriate  Pelvic Exam: deferred    Urine glucose/protein: See prenatal flowsheet       Assessment and Plan    Problem List Items Addressed This Visit     None      Visit Diagnoses     22 weeks gestation of pregnancy    -  Primary    Relevant Orders    POC Protein, Urine, Qualitative, Dipstick (Completed)    POC Glucose, Urine, Qualitative, Dipstick (Completed)          1. Pregnancy at 22w5d  2. Fetal status reassuring.   3. Activity and Exercise discussed.  Rev daily PNV, push flds, small freq high protein snacks.  Return in about 4 weeks (around 2021) for glucola.  Instructions reviewed.  MBT +  Rev preecl prec    Marissa Hernandez, APRN  2021   Helical Rim Advancement Flap Text: The defect edges were debeveled with a #15 blade scalpel.  Given the location of the defect and the proximity to free margins (helical rim) a double helical rim advancement flap was deemed most appropriate.  Using a sterile surgical marker, the appropriate advancement flaps were drawn incorporating the defect and placing the expected incisions between the helical rim and antihelix where possible.  The area thus outlined was incised through and through with a #15 scalpel blade.  With a skin hook and iris scissors, the flaps were gently and sharply undermined and freed up.

## 2023-08-25 ENCOUNTER — APPOINTMENT (OUTPATIENT)
Dept: CT IMAGING | Facility: HOSPITAL | Age: 33
End: 2023-08-25
Payer: COMMERCIAL

## 2023-08-25 ENCOUNTER — HOSPITAL ENCOUNTER (EMERGENCY)
Facility: HOSPITAL | Age: 33
Discharge: HOME | End: 2023-08-25
Attending: EMERGENCY MEDICINE
Payer: COMMERCIAL

## 2023-08-25 VITALS
SYSTOLIC BLOOD PRESSURE: 102 MMHG | OXYGEN SATURATION: 100 % | WEIGHT: 152 LBS | HEART RATE: 81 BPM | BODY MASS INDEX: 26.93 KG/M2 | DIASTOLIC BLOOD PRESSURE: 70 MMHG | HEIGHT: 63 IN | TEMPERATURE: 98 F | RESPIRATION RATE: 16 BRPM

## 2023-08-25 DIAGNOSIS — R42 DIZZINESS: ICD-10-CM

## 2023-08-25 DIAGNOSIS — T50.905A ADVERSE EFFECT OF DRUG, INITIAL ENCOUNTER: ICD-10-CM

## 2023-08-25 DIAGNOSIS — R51.9 ACUTE NONINTRACTABLE HEADACHE, UNSPECIFIED HEADACHE TYPE: Primary | ICD-10-CM

## 2023-08-25 LAB
ALBUMIN SERPL-MCNC: 3.8 G/DL (ref 3.5–5.2)
ALBUMIN/GLOB SERPL: 1.5 G/DL
ALP SERPL-CCNC: 51 U/L (ref 39–117)
ALT SERPL W P-5'-P-CCNC: 11 U/L (ref 1–33)
AMPHET+METHAMPHET UR QL: NEGATIVE
AMPHETAMINES UR QL: NEGATIVE
ANION GAP SERPL CALCULATED.3IONS-SCNC: 12 MMOL/L (ref 5–15)
AST SERPL-CCNC: 19 U/L (ref 1–32)
B PARAPERT DNA SPEC QL NAA+PROBE: NOT DETECTED
B PERT DNA SPEC QL NAA+PROBE: NOT DETECTED
BARBITURATES UR QL SCN: NEGATIVE
BASOPHILS # BLD AUTO: 0.04 10*3/MM3 (ref 0–0.2)
BASOPHILS NFR BLD AUTO: 0.4 % (ref 0–1.5)
BENZODIAZ UR QL SCN: NEGATIVE
BILIRUB SERPL-MCNC: 0.4 MG/DL (ref 0–1.2)
BUN SERPL-MCNC: 7 MG/DL (ref 6–20)
BUN/CREAT SERPL: 10.9 (ref 7–25)
BUPRENORPHINE SERPL-MCNC: NEGATIVE NG/ML
C PNEUM DNA NPH QL NAA+NON-PROBE: NOT DETECTED
CALCIUM SPEC-SCNC: 9.1 MG/DL (ref 8.6–10.5)
CANNABINOIDS SERPL QL: NEGATIVE
CHLORIDE SERPL-SCNC: 103 MMOL/L (ref 98–107)
CO2 SERPL-SCNC: 25 MMOL/L (ref 22–29)
COCAINE UR QL: NEGATIVE
CREAT SERPL-MCNC: 0.64 MG/DL (ref 0.57–1)
D DIMER PPP FEU-MCNC: 0.33 MCGFEU/ML (ref 0–0.5)
DEPRECATED RDW RBC AUTO: 40.6 FL (ref 37–54)
EGFRCR SERPLBLD CKD-EPI 2021: 119.1 ML/MIN/1.73
EOSINOPHIL # BLD AUTO: 0.07 10*3/MM3 (ref 0–0.4)
EOSINOPHIL NFR BLD AUTO: 0.7 % (ref 0.3–6.2)
ERYTHROCYTE [DISTWIDTH] IN BLOOD BY AUTOMATED COUNT: 13 % (ref 12.3–15.4)
FENTANYL UR-MCNC: NEGATIVE NG/ML
FLUAV SUBTYP SPEC NAA+PROBE: NOT DETECTED
FLUBV RNA ISLT QL NAA+PROBE: NOT DETECTED
GLOBULIN UR ELPH-MCNC: 2.6 GM/DL
GLUCOSE SERPL-MCNC: 80 MG/DL (ref 65–99)
HADV DNA SPEC NAA+PROBE: NOT DETECTED
HCOV 229E RNA SPEC QL NAA+PROBE: NOT DETECTED
HCOV HKU1 RNA SPEC QL NAA+PROBE: NOT DETECTED
HCOV NL63 RNA SPEC QL NAA+PROBE: NOT DETECTED
HCOV OC43 RNA SPEC QL NAA+PROBE: NOT DETECTED
HCT VFR BLD AUTO: 38.1 % (ref 34–46.6)
HGB BLD-MCNC: 12.6 G/DL (ref 12–15.9)
HMPV RNA NPH QL NAA+NON-PROBE: NOT DETECTED
HPIV1 RNA ISLT QL NAA+PROBE: NOT DETECTED
HPIV2 RNA SPEC QL NAA+PROBE: NOT DETECTED
HPIV3 RNA NPH QL NAA+PROBE: NOT DETECTED
HPIV4 P GENE NPH QL NAA+PROBE: NOT DETECTED
IMM GRANULOCYTES # BLD AUTO: 0.03 10*3/MM3 (ref 0–0.05)
IMM GRANULOCYTES NFR BLD AUTO: 0.3 % (ref 0–0.5)
IRON 24H UR-MRATE: 37 MCG/DL (ref 37–145)
IRON SATN MFR SERPL: 12 % (ref 20–50)
LYMPHOCYTES # BLD AUTO: 3.45 10*3/MM3 (ref 0.7–3.1)
LYMPHOCYTES NFR BLD AUTO: 36.5 % (ref 19.6–45.3)
M PNEUMO IGG SER IA-ACNC: NOT DETECTED
MCH RBC QN AUTO: 28.6 PG (ref 26.6–33)
MCHC RBC AUTO-ENTMCNC: 33.1 G/DL (ref 31.5–35.7)
MCV RBC AUTO: 86.4 FL (ref 79–97)
METHADONE UR QL SCN: NEGATIVE
MONOCYTES # BLD AUTO: 0.62 10*3/MM3 (ref 0.1–0.9)
MONOCYTES NFR BLD AUTO: 6.6 % (ref 5–12)
NEUTROPHILS NFR BLD AUTO: 5.25 10*3/MM3 (ref 1.7–7)
NEUTROPHILS NFR BLD AUTO: 55.5 % (ref 42.7–76)
NRBC BLD AUTO-RTO: 0 /100 WBC (ref 0–0.2)
OPIATES UR QL: NEGATIVE
OXYCODONE UR QL SCN: NEGATIVE
PCP UR QL SCN: NEGATIVE
PLATELET # BLD AUTO: 287 10*3/MM3 (ref 140–450)
PMV BLD AUTO: 8.9 FL (ref 6–12)
POTASSIUM SERPL-SCNC: 3.4 MMOL/L (ref 3.5–5.2)
PROPOXYPH UR QL: NEGATIVE
PROT SERPL-MCNC: 6.4 G/DL (ref 6–8.5)
RBC # BLD AUTO: 4.41 10*6/MM3 (ref 3.77–5.28)
RHINOVIRUS RNA SPEC NAA+PROBE: NOT DETECTED
RSV RNA NPH QL NAA+NON-PROBE: NOT DETECTED
SARS-COV-2 RNA NPH QL NAA+NON-PROBE: NOT DETECTED
SODIUM SERPL-SCNC: 140 MMOL/L (ref 136–145)
TIBC SERPL-MCNC: 301 MCG/DL (ref 298–536)
TRANSFERRIN SERPL-MCNC: 202 MG/DL (ref 200–360)
TRICYCLICS UR QL SCN: NEGATIVE
TROPONIN T SERPL HS-MCNC: <6 NG/L
WBC NRBC COR # BLD: 9.46 10*3/MM3 (ref 3.4–10.8)

## 2023-08-25 PROCEDURE — 25010000002 KETOROLAC TROMETHAMINE PER 15 MG: Performed by: EMERGENCY MEDICINE

## 2023-08-25 PROCEDURE — 84484 ASSAY OF TROPONIN QUANT: CPT | Performed by: EMERGENCY MEDICINE

## 2023-08-25 PROCEDURE — 85379 FIBRIN DEGRADATION QUANT: CPT | Performed by: EMERGENCY MEDICINE

## 2023-08-25 PROCEDURE — 70450 CT HEAD/BRAIN W/O DYE: CPT

## 2023-08-25 PROCEDURE — 85025 COMPLETE CBC W/AUTO DIFF WBC: CPT | Performed by: EMERGENCY MEDICINE

## 2023-08-25 PROCEDURE — 36415 COLL VENOUS BLD VENIPUNCTURE: CPT

## 2023-08-25 PROCEDURE — 80053 COMPREHEN METABOLIC PANEL: CPT | Performed by: EMERGENCY MEDICINE

## 2023-08-25 PROCEDURE — 99284 EMERGENCY DEPT VISIT MOD MDM: CPT

## 2023-08-25 PROCEDURE — 96375 TX/PRO/DX INJ NEW DRUG ADDON: CPT

## 2023-08-25 PROCEDURE — 83540 ASSAY OF IRON: CPT | Performed by: EMERGENCY MEDICINE

## 2023-08-25 PROCEDURE — 0202U NFCT DS 22 TRGT SARS-COV-2: CPT | Performed by: EMERGENCY MEDICINE

## 2023-08-25 PROCEDURE — 80307 DRUG TEST PRSMV CHEM ANLYZR: CPT | Performed by: EMERGENCY MEDICINE

## 2023-08-25 PROCEDURE — 84466 ASSAY OF TRANSFERRIN: CPT | Performed by: EMERGENCY MEDICINE

## 2023-08-25 PROCEDURE — 93005 ELECTROCARDIOGRAM TRACING: CPT | Performed by: EMERGENCY MEDICINE

## 2023-08-25 PROCEDURE — 25010000002 DIPHENHYDRAMINE PER 50 MG: Performed by: EMERGENCY MEDICINE

## 2023-08-25 PROCEDURE — 96374 THER/PROPH/DIAG INJ IV PUSH: CPT

## 2023-08-25 PROCEDURE — 25010000002 METOCLOPRAMIDE PER 10 MG: Performed by: EMERGENCY MEDICINE

## 2023-08-25 RX ORDER — KETOROLAC TROMETHAMINE 15 MG/ML
15 INJECTION, SOLUTION INTRAMUSCULAR; INTRAVENOUS ONCE
Status: COMPLETED | OUTPATIENT
Start: 2023-08-25 | End: 2023-08-25

## 2023-08-25 RX ORDER — SODIUM CHLORIDE 0.9 % (FLUSH) 0.9 %
10 SYRINGE (ML) INJECTION AS NEEDED
Status: DISCONTINUED | OUTPATIENT
Start: 2023-08-25 | End: 2023-08-25 | Stop reason: HOSPADM

## 2023-08-25 RX ORDER — METOCLOPRAMIDE HYDROCHLORIDE 5 MG/ML
10 INJECTION INTRAMUSCULAR; INTRAVENOUS ONCE
Status: COMPLETED | OUTPATIENT
Start: 2023-08-25 | End: 2023-08-25

## 2023-08-25 RX ORDER — DIPHENHYDRAMINE HYDROCHLORIDE 50 MG/ML
25 INJECTION INTRAMUSCULAR; INTRAVENOUS ONCE
Status: COMPLETED | OUTPATIENT
Start: 2023-08-25 | End: 2023-08-25

## 2023-08-25 RX ADMIN — KETOROLAC TROMETHAMINE 15 MG: 15 INJECTION, SOLUTION INTRAMUSCULAR; INTRAVENOUS at 17:59

## 2023-08-25 RX ADMIN — METOCLOPRAMIDE 10 MG: 5 INJECTION, SOLUTION INTRAMUSCULAR; INTRAVENOUS at 18:00

## 2023-08-25 RX ADMIN — SODIUM CHLORIDE 1000 ML: 9 INJECTION, SOLUTION INTRAVENOUS at 17:59

## 2023-08-25 RX ADMIN — DIPHENHYDRAMINE HYDROCHLORIDE 25 MG: 50 INJECTION INTRAMUSCULAR; INTRAVENOUS at 18:00

## 2023-08-25 NOTE — Clinical Note
[facility] EMERGENCY DEPARTMENT  [address]  [facility] [address]  Phone: [phone]    Gunjan Engel was seen and treated in our emergency department on 8/25/2023.  She may return to work on 08/29/2023.         Thank you for choosing [facility].    Mamta Nix MD

## 2023-08-25 NOTE — DISCHARGE INSTRUCTIONS
Make sure to drink plenty of fluid.    Take Tylenol or ibuprofen as needed for headache.    Follow-up with neurology for outpatient evaluation.    Follow-up with syncope clinic for outpatient evaluation.    Discuss further use of Ozempic with the prescribing provider.

## 2023-08-27 LAB
QT INTERVAL: 408 MS
QTC INTERVAL: 459 MS

## 2023-08-27 NOTE — ED PROVIDER NOTES
Subjective   History of Present Illness  33-year-old female with a history of migraine headaches who presents with symptoms concerning for a complex migraine headache.  The patient reports an intermittent history of dizziness throughout the day.  She reports that she began to have a headache and actually pulled over to the side of the road and may have possibly passed out or nearly passed out.  This is happened to her before in the past.  The normal intact neurological exam, GCS 15.  NIH stroke scale 0.  Patient reports that in November she suffered a concussion and secondary to that has had migraine headaches at times they have been complex.  She states that she began to develop a right-sided headache and some left-sided facial numbness.  There is no focal deficits on exam.  Negative NIH stroke scale.  She denies chest pain, cough, shortness of breath.  No fever or infectious symptoms.  No abdominal pain or change in bowel or urinary function.  She denies any new medications.  She is awake and alert with normal mentation upon arrival and able to answer all questions appropriately.    Review of Systems   Constitutional:  Negative for chills, fatigue and fever.   HENT:  Negative for congestion, ear pain, postnasal drip, sinus pressure and sore throat.    Eyes:  Negative for pain, redness and visual disturbance.   Respiratory:  Negative for cough, chest tightness and shortness of breath.    Cardiovascular:  Negative for chest pain, palpitations and leg swelling.   Gastrointestinal:  Negative for abdominal pain, anal bleeding, blood in stool, diarrhea, nausea and vomiting.   Endocrine: Negative for polydipsia and polyuria.   Genitourinary:  Negative for difficulty urinating, dysuria, frequency and urgency.   Musculoskeletal:  Negative for arthralgias, back pain and neck pain.   Skin:  Negative for pallor and rash.   Allergic/Immunologic: Negative for environmental allergies and immunocompromised state.   Neurological:   "Positive for syncope and headaches. Negative for dizziness and weakness.   Hematological:  Negative for adenopathy.   Psychiatric/Behavioral:  Negative for confusion, self-injury and suicidal ideas. The patient is not nervous/anxious.    All other systems reviewed and are negative.    Past Medical History:   Diagnosis Date    Anxiety     Arrhythmia     reported hx of \"arrhythmia\" by PCP. No cardiac eval.    Asthma     uses albuterol fairly often.    Bulimia nervosa     Chronic back pain     PRN tylenol    Depression     Diabetes mellitus     Dyspepsia     with greasy foods    Endometriosis determined by laparoscopy     History of 8-9 laparoscopies Dr. Evgeny Varela    Heartburn     tums PRN. No egd or h pylori    History of COVID-19 2021    History of uterine fibroid     ,     Hypertension     H/O    Migraine     Ovarian cyst     PCOS (polycystic ovarian syndrome)     Post partum depression 2016    Seasonal allergies        Allergies   Allergen Reactions    Iodine Anaphylaxis    Latex Anaphylaxis    Methylene Blue Anaphylaxis    Mushroom Extract Complex Anaphylaxis    Percocet [Oxycodone-Acetaminophen] Anaphylaxis    Codeine Other (See Comments)    Shellfish-Derived Products Other (See Comments)     Unsure        Past Surgical History:   Procedure Laterality Date     SECTION Bilateral 2017    Procedure:  SECTION REPEAT;  Surgeon: Cheikh Bonilla MD;  Location:  [facility];  Service:      SECTION N/A 10/01/2021    Procedure:  SECTION REPEAT;  Surgeon: Luzmaria Forbes MD;  Location:  [facility];  Service: Obstetrics/Gynecology;  Laterality: N/A;    DIAGNOSTIC LAPAROSCOPY      TIMES - for endometriosis; 7054-5405    MYOMECTOMY  ,        Family History   Problem Relation Age of Onset    Cancer Mother     Heart disease Mother     Hypertension Mother     Diabetes Mother     Ovarian cancer Mother 36    Obesity Mother     Hypertension " Father     Colon cancer Father 40    Sleep apnea Father     Obesity Sister     Obesity Sister     Drug abuse Brother     Hypertension Maternal Grandmother     Diabetes Maternal Grandmother     Obesity Maternal Grandmother     Cancer Paternal Grandmother     Osteoporosis Paternal Grandmother     Breast cancer Maternal Aunt 40    Ovarian cancer Other        Social History     Socioeconomic History    Marital status:    Tobacco Use    Smoking status: Never    Smokeless tobacco: Never   Vaping Use    Vaping Use: Never used   Substance and Sexual Activity    Alcohol use: Yes     Alcohol/week: 1.0 standard drink     Types: 1 Glasses of wine per week     Comment: occasional, glass of wine every couple weeks    Drug use: Never    Sexual activity: Yes     Partners: Male     Birth control/protection: Tubal ligation           Objective   Physical Exam  Vitals and nursing note reviewed.   Constitutional:       General: She is not in acute distress.     Appearance: Normal appearance. She is well-developed. She is not toxic-appearing or diaphoretic.   HENT:      Head: Normocephalic and atraumatic.      Right Ear: External ear normal.      Left Ear: External ear normal.      Nose: Nose normal.   Eyes:      General: Lids are normal.      Pupils: Pupils are equal, round, and reactive to light.   Neck:      Trachea: No tracheal deviation.   Cardiovascular:      Rate and Rhythm: Normal rate and regular rhythm.      Pulses: No decreased pulses.      Heart sounds: Normal heart sounds. No murmur heard.    No friction rub. No gallop.   Pulmonary:      Effort: Pulmonary effort is normal. No respiratory distress.      Breath sounds: Normal breath sounds. No decreased breath sounds, wheezing, rhonchi or rales.   Abdominal:      General: Bowel sounds are normal.      Palpations: Abdomen is soft.      Tenderness: There is no abdominal tenderness. There is no guarding or rebound.   Musculoskeletal:         General: No deformity. Normal  range of motion.      Cervical back: Normal range of motion and neck supple.   Lymphadenopathy:      Cervical: No cervical adenopathy.   Skin:     General: Skin is warm and dry.      Findings: No rash.   Neurological:      Mental Status: She is alert and oriented to person, place, and time.      GCS: GCS eye subscore is 4. GCS verbal subscore is 5. GCS motor subscore is 6.      Cranial Nerves: No cranial nerve deficit.      Sensory: No sensory deficit.      Comments: Normal intact neurological exam, GCS 15, NIH stroke scale of 0   Psychiatric:         Speech: Speech normal.         Behavior: Behavior normal.         Thought Content: Thought content normal.         Judgment: Judgment normal.       Procedures           ED Course                                           Medical Decision Making  Differential diagnosis includes viral respiratory infection, complex migraine headache, TIA, stroke, vasovagal syncope, pulmonary embolism, other unspecified etiology.    Lab evaluation shows normal white count, normal H&H, normal D-dimer, negative urine drug screen, negative viral respiratory panel, normal kidney function electrolytes.    CT scan of the head without contrast shows no acute abnormalities.  The patient received a migraine headache and that symptoms completely resolved.  The patient appeared normal on recheck with a normal mentation, GCS 15.    Problems Addressed:  Acute nonintractable headache, unspecified headache type: complicated acute illness or injury with systemic symptoms  Adverse effect of drug, initial encounter: complicated acute illness or injury  Dizziness: complicated acute illness or injury    Amount and/or Complexity of Data Reviewed  Labs: ordered.  Radiology: ordered.  ECG/medicine tests: ordered.    Risk  Prescription drug management.        Final diagnoses:   Acute nonintractable headache, unspecified headache type   Dizziness   Adverse effect of drug, initial encounter       ED  Disposition  ED Disposition       ED Disposition   Discharge    Condition   Stable    Comment   --               Sujatha Wang MD  [address] [place] [address]  [phone]    In 1 week           Medication List      No changes were made to your prescriptions during this visit.            Mamta Nix MD  08/27/23 0659

## 2023-08-29 ENCOUNTER — TELEPHONE (OUTPATIENT)
Dept: NEUROLOGY | Facility: CLINIC | Age: 33
End: 2023-08-29

## 2023-08-29 NOTE — TELEPHONE ENCOUNTER
"    DERICK,    PATIENT CALLED FOR HOSPITAL FOLLOW-UP APPT.  I COULD SCHEDULE HER AT [place] IN OCTOBER BUT SHE IS REQUESTING TO BE SEEN SOONER IN [place].    SHE STATES SHE HAS \"PASSED-OUT\" IN FRONT OF HER KIDS AND WANTS TO RESOLVE THIS ASAP.    PLEASE REVIEW AND ADVISE PATIENT    THANK YOU   "

## 2023-08-30 NOTE — TELEPHONE ENCOUNTER
Left voicemail for patient to call back. [facility] will not have anything before October. We can start a self-referral for [facility] office if patient is okay with that, then send to review for dizziness concerns.

## 2024-01-16 ENCOUNTER — HOSPITAL ENCOUNTER (EMERGENCY)
Facility: HOSPITAL | Age: 34
Discharge: HOME | End: 2024-01-16
Attending: STUDENT IN AN ORGANIZED HEALTH CARE EDUCATION/TRAINING PROGRAM | Admitting: STUDENT IN AN ORGANIZED HEALTH CARE EDUCATION/TRAINING PROGRAM
Payer: COMMERCIAL

## 2024-01-16 ENCOUNTER — APPOINTMENT (OUTPATIENT)
Dept: CT IMAGING | Facility: HOSPITAL | Age: 34
End: 2024-01-16
Payer: COMMERCIAL

## 2024-01-16 VITALS
HEART RATE: 92 BPM | TEMPERATURE: 97.9 F | HEIGHT: 63 IN | WEIGHT: 122 LBS | RESPIRATION RATE: 18 BRPM | SYSTOLIC BLOOD PRESSURE: 104 MMHG | DIASTOLIC BLOOD PRESSURE: 69 MMHG | OXYGEN SATURATION: 100 % | BODY MASS INDEX: 21.62 KG/M2

## 2024-01-16 DIAGNOSIS — N23 RENAL COLIC: Primary | ICD-10-CM

## 2024-01-16 LAB
ALBUMIN SERPL-MCNC: 4.1 G/DL (ref 3.5–5.2)
ALBUMIN/GLOB SERPL: 1.4 G/DL
ALP SERPL-CCNC: 59 U/L (ref 39–117)
ALT SERPL W P-5'-P-CCNC: 12 U/L (ref 1–33)
ANION GAP SERPL CALCULATED.3IONS-SCNC: 8.3 MMOL/L (ref 5–15)
AST SERPL-CCNC: 15 U/L (ref 1–32)
B-HCG UR QL: NEGATIVE
BASOPHILS # BLD AUTO: 0.04 10*3/MM3 (ref 0–0.2)
BASOPHILS NFR BLD AUTO: 0.3 % (ref 0–1.5)
BILIRUB SERPL-MCNC: 0.5 MG/DL (ref 0–1.2)
BILIRUB UR QL STRIP: NEGATIVE
BUN SERPL-MCNC: 8 MG/DL (ref 6–20)
BUN/CREAT SERPL: 13.1 (ref 7–25)
CALCIUM SPEC-SCNC: 9.1 MG/DL (ref 8.6–10.5)
CHLORIDE SERPL-SCNC: 100 MMOL/L (ref 98–107)
CLARITY UR: CLEAR
CO2 SERPL-SCNC: 27.7 MMOL/L (ref 22–29)
COLOR UR: YELLOW
CREAT SERPL-MCNC: 0.61 MG/DL (ref 0.57–1)
CRP SERPL-MCNC: 3.01 MG/DL (ref 0–0.5)
D-LACTATE SERPL-SCNC: 0.7 MMOL/L (ref 0.5–2)
DEPRECATED RDW RBC AUTO: 38.6 FL (ref 37–54)
EGFRCR SERPLBLD CKD-EPI 2021: 120.5 ML/MIN/1.73
EOSINOPHIL # BLD AUTO: 0.02 10*3/MM3 (ref 0–0.4)
EOSINOPHIL NFR BLD AUTO: 0.1 % (ref 0.3–6.2)
ERYTHROCYTE [DISTWIDTH] IN BLOOD BY AUTOMATED COUNT: 11.9 % (ref 12.3–15.4)
FLUAV SUBTYP SPEC NAA+PROBE: NOT DETECTED
FLUBV RNA ISLT QL NAA+PROBE: NOT DETECTED
GLOBULIN UR ELPH-MCNC: 3 GM/DL
GLUCOSE SERPL-MCNC: 97 MG/DL (ref 65–99)
GLUCOSE UR STRIP-MCNC: NEGATIVE MG/DL
HCT VFR BLD AUTO: 37.5 % (ref 34–46.6)
HGB BLD-MCNC: 12.5 G/DL (ref 12–15.9)
HGB UR QL STRIP.AUTO: NEGATIVE
HOLD SPECIMEN: NORMAL
HOLD SPECIMEN: NORMAL
IMM GRANULOCYTES # BLD AUTO: 0.05 10*3/MM3 (ref 0–0.05)
IMM GRANULOCYTES NFR BLD AUTO: 0.3 % (ref 0–0.5)
KETONES UR QL STRIP: ABNORMAL
LEUKOCYTE ESTERASE UR QL STRIP.AUTO: NEGATIVE
LIPASE SERPL-CCNC: 31 U/L (ref 13–60)
LYMPHOCYTES # BLD AUTO: 1.24 10*3/MM3 (ref 0.7–3.1)
LYMPHOCYTES NFR BLD AUTO: 7.8 % (ref 19.6–45.3)
MCH RBC QN AUTO: 29.3 PG (ref 26.6–33)
MCHC RBC AUTO-ENTMCNC: 33.3 G/DL (ref 31.5–35.7)
MCV RBC AUTO: 87.8 FL (ref 79–97)
MONOCYTES # BLD AUTO: 0.7 10*3/MM3 (ref 0.1–0.9)
MONOCYTES NFR BLD AUTO: 4.4 % (ref 5–12)
NEUTROPHILS NFR BLD AUTO: 13.8 10*3/MM3 (ref 1.7–7)
NEUTROPHILS NFR BLD AUTO: 87.1 % (ref 42.7–76)
NITRITE UR QL STRIP: NEGATIVE
NRBC BLD AUTO-RTO: 0 /100 WBC (ref 0–0.2)
PH UR STRIP.AUTO: 8 [PH] (ref 5–8)
PLATELET # BLD AUTO: 219 10*3/MM3 (ref 140–450)
PMV BLD AUTO: 8 FL (ref 6–12)
POTASSIUM SERPL-SCNC: 3.7 MMOL/L (ref 3.5–5.2)
PROT SERPL-MCNC: 7.1 G/DL (ref 6–8.5)
PROT UR QL STRIP: NEGATIVE
RBC # BLD AUTO: 4.27 10*6/MM3 (ref 3.77–5.28)
SARS-COV-2 RNA RESP QL NAA+PROBE: NOT DETECTED
SODIUM SERPL-SCNC: 136 MMOL/L (ref 136–145)
SP GR UR STRIP: 1.01 (ref 1–1.03)
UROBILINOGEN UR QL STRIP: ABNORMAL
WBC NRBC COR # BLD AUTO: 15.85 10*3/MM3 (ref 3.4–10.8)
WHOLE BLOOD HOLD COAG: NORMAL
WHOLE BLOOD HOLD SPECIMEN: NORMAL

## 2024-01-16 PROCEDURE — 74176 CT ABD & PELVIS W/O CONTRAST: CPT

## 2024-01-16 PROCEDURE — 81003 URINALYSIS AUTO W/O SCOPE: CPT | Performed by: STUDENT IN AN ORGANIZED HEALTH CARE EDUCATION/TRAINING PROGRAM

## 2024-01-16 PROCEDURE — 99284 EMERGENCY DEPT VISIT MOD MDM: CPT

## 2024-01-16 PROCEDURE — 83605 ASSAY OF LACTIC ACID: CPT | Performed by: STUDENT IN AN ORGANIZED HEALTH CARE EDUCATION/TRAINING PROGRAM

## 2024-01-16 PROCEDURE — 87636 SARSCOV2 & INF A&B AMP PRB: CPT | Performed by: STUDENT IN AN ORGANIZED HEALTH CARE EDUCATION/TRAINING PROGRAM

## 2024-01-16 PROCEDURE — 81025 URINE PREGNANCY TEST: CPT | Performed by: STUDENT IN AN ORGANIZED HEALTH CARE EDUCATION/TRAINING PROGRAM

## 2024-01-16 PROCEDURE — 86140 C-REACTIVE PROTEIN: CPT | Performed by: STUDENT IN AN ORGANIZED HEALTH CARE EDUCATION/TRAINING PROGRAM

## 2024-01-16 PROCEDURE — 96374 THER/PROPH/DIAG INJ IV PUSH: CPT

## 2024-01-16 PROCEDURE — 25010000002 KETOROLAC TROMETHAMINE PER 15 MG: Performed by: STUDENT IN AN ORGANIZED HEALTH CARE EDUCATION/TRAINING PROGRAM

## 2024-01-16 PROCEDURE — 83690 ASSAY OF LIPASE: CPT | Performed by: STUDENT IN AN ORGANIZED HEALTH CARE EDUCATION/TRAINING PROGRAM

## 2024-01-16 PROCEDURE — 80053 COMPREHEN METABOLIC PANEL: CPT | Performed by: STUDENT IN AN ORGANIZED HEALTH CARE EDUCATION/TRAINING PROGRAM

## 2024-01-16 PROCEDURE — 85025 COMPLETE CBC W/AUTO DIFF WBC: CPT | Performed by: STUDENT IN AN ORGANIZED HEALTH CARE EDUCATION/TRAINING PROGRAM

## 2024-01-16 RX ORDER — KETOROLAC TROMETHAMINE 30 MG/ML
15 INJECTION, SOLUTION INTRAMUSCULAR; INTRAVENOUS ONCE
Status: COMPLETED | OUTPATIENT
Start: 2024-01-16 | End: 2024-01-16

## 2024-01-16 RX ORDER — TAMSULOSIN HYDROCHLORIDE 0.4 MG/1
1 CAPSULE ORAL DAILY
Qty: 12 CAPSULE | Refills: 0 | Status: SHIPPED | OUTPATIENT
Start: 2024-01-16

## 2024-01-16 RX ORDER — ONDANSETRON 4 MG/1
4 TABLET, ORALLY DISINTEGRATING ORAL EVERY 4 HOURS PRN
Qty: 15 TABLET | Refills: 0 | Status: SHIPPED | OUTPATIENT
Start: 2024-01-16

## 2024-01-16 RX ORDER — NAPROXEN 500 MG/1
500 TABLET ORAL 2 TIMES DAILY WITH MEALS
Qty: 20 TABLET | Refills: 0 | Status: SHIPPED | OUTPATIENT
Start: 2024-01-16

## 2024-01-16 RX ORDER — KETOROLAC TROMETHAMINE 10 MG/1
10 TABLET, FILM COATED ORAL EVERY 6 HOURS PRN
Qty: 10 TABLET | Refills: 0 | Status: SHIPPED | OUTPATIENT
Start: 2024-01-16

## 2024-01-16 RX ORDER — SODIUM CHLORIDE 0.9 % (FLUSH) 0.9 %
10 SYRINGE (ML) INJECTION AS NEEDED
Status: DISCONTINUED | OUTPATIENT
Start: 2024-01-16 | End: 2024-01-17 | Stop reason: HOSPADM

## 2024-01-16 RX ADMIN — KETOROLAC TROMETHAMINE 15 MG: 30 INJECTION, SOLUTION INTRAMUSCULAR; INTRAVENOUS at 23:15

## 2024-01-17 NOTE — ED PROVIDER NOTES
Subjective:  History of Present Illness:    Patient is a 33-year-old female with history of asthma, hypertension, PCOS presents today with dysuria.  Reports ongoing dysuria over the last 7 days.  States that she has chronic burning when she urinates but has grown in severity.  Began to have flank pain today and was concern for urinary tract infection and now presents our emergency department for evaluation.  Denies any fevers.  No chest pain or shortness of breath.  Denies any cough or congestion.  No nausea vomiting or diarrhea.  Denies any abnormal vaginal bleeding or discharge.  Denies pregnancy.      Nurses Notes reviewed and agree, including vitals, allergies, social history and prior medical history.     REVIEW OF SYSTEMS: All systems reviewed and not pertinent unless noted.  Review of Systems   Constitutional:  Positive for activity change. Negative for appetite change, chills, fatigue and fever.   HENT:  Positive for congestion. Negative for rhinorrhea, sinus pressure and sinus pain.    Eyes:  Negative for discharge and itching.   Respiratory:  Negative for cough and shortness of breath.    Cardiovascular:  Negative for chest pain and leg swelling.   Gastrointestinal:  Negative for abdominal distention, abdominal pain, nausea and vomiting.   Endocrine: Negative for cold intolerance and heat intolerance.   Genitourinary:  Positive for dysuria, flank pain and frequency. Negative for decreased urine volume, difficulty urinating, urgency, vaginal bleeding, vaginal discharge and vaginal pain.   Musculoskeletal:  Negative for gait problem, neck pain and neck stiffness.   Skin:  Negative for color change.   Allergic/Immunologic: Negative for environmental allergies.   Neurological:  Negative for seizures, syncope, facial asymmetry and speech difficulty.   Psychiatric/Behavioral:  Negative for self-injury and suicidal ideas.        Past Medical History:   Diagnosis Date    Anxiety     Arrhythmia     reported hx of  "\"arrhythmia\" by PCP. No cardiac eval.    Asthma     uses albuterol fairly often.    Bulimia nervosa     Chronic back pain     PRN tylenol    Depression     Diabetes mellitus     Dyspepsia     with greasy foods    Endometriosis determined by laparoscopy     History of 8-9 laparoscopies Dr. Evgeny Houser     tums PRN. No egd or h pylori    History of COVID-19 2021    History of uterine fibroid     ,     Hypertension     H/O    Migraine     Ovarian cyst     PCOS (polycystic ovarian syndrome)     Post partum depression 2016    Seasonal allergies        Allergies:    Iodine, Latex, Methylene blue, Mushroom extract complex, Percocet [oxycodone-acetaminophen], Codeine, and Shellfish-derived products      Past Surgical History:   Procedure Laterality Date     SECTION Bilateral 2017    Procedure:  SECTION REPEAT;  Surgeon: Cheikh Bonilla MD;  Location:  [email or web address];  Service:      SECTION N/A 10/01/2021    Procedure:  SECTION REPEAT;  Surgeon: Luzmaria Forbes MD;  Location:  [email or web address];  Service: Obstetrics/Gynecology;  Laterality: N/A;    DIAGNOSTIC LAPAROSCOPY      TIMES - for endometriosis; 9874-2474    MYOMECTOMY  ,          Social History     Socioeconomic History    Marital status:    Tobacco Use    Smoking status: Never    Smokeless tobacco: Never   Vaping Use    Vaping Use: Never used   Substance and Sexual Activity    Alcohol use: Yes     Alcohol/week: 1.0 standard drink of alcohol     Types: 1 Glasses of wine per week     Comment: occasional, glass of wine every couple weeks    Drug use: Never    Sexual activity: Yes     Partners: Male     Birth control/protection: Tubal ligation         Family History   Problem Relation Age of Onset    Cancer Mother     Heart disease Mother     Hypertension Mother     Diabetes Mother     Ovarian cancer Mother 36    Obesity Mother     Hypertension Father     Colon cancer " "Father 40    Sleep apnea Father     Obesity Sister     Obesity Sister     Drug abuse Brother     Hypertension Maternal Grandmother     Diabetes Maternal Grandmother     Obesity Maternal Grandmother     Cancer Paternal Grandmother     Osteoporosis Paternal Grandmother     Breast cancer Maternal Aunt 40    Ovarian cancer Other        Objective  Physical Exam:  /69   Pulse 92   Temp 97.9 °F (36.6 °C) (Oral)   Resp 18   Ht 160 cm (63\")   Wt 55.3 kg (122 lb)   SpO2 100%   BMI 21.61 kg/m²      Physical Exam  Constitutional:       General: She is not in acute distress.     Appearance: Normal appearance. She is not ill-appearing.   HENT:      Head: Normocephalic and atraumatic.      Nose: Nose normal. No congestion or rhinorrhea.      Mouth/Throat:      Mouth: Mucous membranes are dry.      Pharynx: Oropharynx is clear. No oropharyngeal exudate or posterior oropharyngeal erythema.   Eyes:      Extraocular Movements: Extraocular movements intact.      Conjunctiva/sclera: Conjunctivae normal.      Pupils: Pupils are equal, round, and reactive to light.   Cardiovascular:      Rate and Rhythm: Normal rate and regular rhythm.      Pulses: Normal pulses.      Heart sounds: Normal heart sounds.   Pulmonary:      Effort: Pulmonary effort is normal. No respiratory distress.      Breath sounds: Normal breath sounds.   Abdominal:      General: Abdomen is flat. Bowel sounds are normal. There is no distension.      Palpations: Abdomen is soft.      Tenderness: There is no abdominal tenderness. There is no guarding or rebound.   Musculoskeletal:         General: No swelling or tenderness. Normal range of motion.      Cervical back: Normal range of motion and neck supple.   Skin:     General: Skin is warm and dry.      Capillary Refill: Capillary refill takes less than 2 seconds.   Neurological:      General: No focal deficit present.      Mental Status: She is alert and oriented to person, place, and time. Mental status is " at baseline.      Cranial Nerves: No cranial nerve deficit.      Sensory: No sensory deficit.      Motor: No weakness.      Coordination: Coordination normal.   Psychiatric:         Mood and Affect: Mood normal.         Behavior: Behavior normal.         Thought Content: Thought content normal.         Judgment: Judgment normal.         Procedures    ED Course:         Lab Results (last 24 hours)       Procedure Component Value Units Date/Time    CBC & Differential [[id number]]  (Abnormal) Collected: 01/16/24 2218    Specimen: Blood Updated: 01/16/24 2224    Narrative:      The following orders were created for panel order CBC & Differential.  Procedure                               Abnormality         Status                     ---------                               -----------         ------                     CBC Auto Differential[[id number]]        Abnormal            Final result                 Please view results for these tests on the individual orders.    Comprehensive Metabolic Panel [[id number]] Collected: 01/16/24 2218    Specimen: Blood Updated: 01/16/24 2241     Glucose 97 mg/dL      BUN 8 mg/dL      Creatinine 0.61 mg/dL      Sodium 136 mmol/L      Potassium 3.7 mmol/L      Chloride 100 mmol/L      CO2 27.7 mmol/L      Calcium 9.1 mg/dL      Total Protein 7.1 g/dL      Albumin 4.1 g/dL      ALT (SGPT) 12 U/L      AST (SGOT) 15 U/L      Alkaline Phosphatase 59 U/L      Total Bilirubin 0.5 mg/dL      Globulin 3.0 gm/dL      A/G Ratio 1.4 g/dL      BUN/Creatinine Ratio 13.1     Anion Gap 8.3 mmol/L      eGFR 120.5 mL/min/1.73     Narrative:      GFR Normal >60  Chronic Kidney Disease <60  Kidney Failure <15      Lipase [[id number]]  (Normal) Collected: 01/16/24 2218    Specimen: Blood Updated: 01/16/24 2241     Lipase 31 U/L     CBC Auto Differential [[id number]]  (Abnormal) Collected: 01/16/24 2218    Specimen: Blood Updated: 01/16/24 2224     WBC 15.85 10*3/mm3      RBC 4.27 10*6/mm3      Hemoglobin 12.5  g/dL      Hematocrit 37.5 %      MCV 87.8 fL      MCH 29.3 pg      MCHC 33.3 g/dL      RDW 11.9 %      RDW-SD 38.6 fl      MPV 8.0 fL      Platelets 219 10*3/mm3      Neutrophil % 87.1 %      Lymphocyte % 7.8 %      Monocyte % 4.4 %      Eosinophil % 0.1 %      Basophil % 0.3 %      Immature Grans % 0.3 %      Neutrophils, Absolute 13.80 10*3/mm3      Lymphocytes, Absolute 1.24 10*3/mm3      Monocytes, Absolute 0.70 10*3/mm3      Eosinophils, Absolute 0.02 10*3/mm3      Basophils, Absolute 0.04 10*3/mm3      Immature Grans, Absolute 0.05 10*3/mm3      nRBC 0.0 /100 WBC     Urinalysis With Culture If Indicated - Urine, Clean Catch [[id number]]  (Abnormal) Collected: 01/16/24 2218    Specimen: Urine, Clean Catch Updated: 01/16/24 2226     Color, UA Yellow     Appearance, UA Clear     pH, UA 8.0     Specific Gravity, UA 1.010     Glucose, UA Negative     Ketones, UA Trace     Bilirubin, UA Negative     Blood, UA Negative     Protein, UA Negative     Leuk Esterase, UA Negative     Nitrite, UA Negative     Urobilinogen, UA 0.2 E.U./dL    Narrative:      In absence of clinical symptoms, the presence of pyuria, bacteria, and/or nitrites on the urinalysis result does not correlate with infection.  Urine microscopic not indicated.    C-reactive Protein [[id number]]  (Abnormal) Collected: 01/16/24 2218    Specimen: Blood Updated: 01/16/24 2241     C-Reactive Protein 3.01 mg/dL     Pregnancy, Urine - Urine, Clean Catch [[id number]]  (Normal) Collected: 01/16/24 2218    Specimen: Urine, Clean Catch Updated: 01/16/24 2233     HCG, Urine QL Negative    COVID-19 and FLU A/B PCR, 1 HR TAT - Swab, Nasopharynx [[id number]]  (Normal) Collected: 01/16/24 2224    Specimen: Swab from Nasopharynx Updated: 01/16/24 2254     COVID19 Not Detected     Influenza A PCR Not Detected     Influenza B PCR Not Detected    Narrative:      Fact sheet for providers: https://www.fda.gov/media/108469/download    Fact sheet for patients:  https://www.fda.gov/media/676560/download    Test performed by PCR.    Lactic Acid, Plasma [[id number]]  (Normal) Collected: 01/16/24 2224    Specimen: Blood Updated: 01/16/24 2247     Lactate 0.7 mmol/L              CT Abdomen Pelvis Without Contrast    Result Date: 1/16/2024  FINAL REPORT TECHNIQUE: null CLINICAL HISTORY: Flank pain, kidney stone suspected COMPARISON: null FINDINGS: CT abdomen and pelvis without contrast Comparison: None Findings: No consolidation or effusion. 3 mm nonobstructing left inferior renal stone. Gallbladder and solid organs otherwise unremarkable. No bowel obstruction, pneumoperitoneum, or pneumatosis. No hernia. Uterus and left ovary unremarkable. Right ovary not identified. Appendix not identified. Physiologic amount of free fluid in the pelvis. Multiple pelvic phleboliths. The bones are intact.     Impression: IMPRESSION: Nonobstructing 3 mm left renal stone. Authenticated and Electronically Signed by Yamil Christie MD on 01/16/2024 11:20:36 PM        MDM      Initial impression of presenting illness: Dysuria    DDX: includes but is not limited to: Urinary tract infection, pyelonephritis, sepsis    Patient arrives stable with vitals interpreted by myself.     Pertinent features from physical exam: Clear to auscultation, regular rhythm, no murmur, nontender to abdominal palpation, no CVA tenderness.    Initial diagnostic plan: CBC, CMP, lipase, UA, CRP, lactic acid    Results from initial plan were reviewed and interpreted by me revealing nephrolithiasis with moderate hydro with no urinary tract infection    Diagnostic information from other sources: Reviewed past medical records    Interventions / Re-evaluation: Given Toradol for symptom control    Results/clinical rationale were discussed with patient at bedside    Consultations/Discussion of results with other physicians: Discussed diagnosis of nephrolithiasis and encouraged urology follow-up.  Given Toradol, naproxen after  Toradol use, Zofran and Flomax.  Had offered patient oxycodone without Tylenol which patient adamantly declines.  Strict turn precaution for fevers    Disposition plan: Discharge  -----        Final diagnoses:   Renal colic          Dmitri Reilly MD  01/17/24 0046

## 2024-01-17 NOTE — DISCHARGE INSTRUCTIONS
You were evaluated for flank pain.  We got a CT scan of angioid a kidney stone.  You are now stable for discharge.  We recommend following up with urology to ensure that you pass the stone appropriately.  This is small enough that it should pass on its own.  Trop with your symptoms, we have sent a course of Toradol Zofran and Flomax to your pharmacy to help with symptoms.  We have also sent naproxen to help with your pain as you are allergic to Percocet.  You should not take the Toradol with the naproxen.  She will take naproxen if you have persistent pain after stopping Toradol.  You are now stable for discharge.

## 2024-04-05 ENCOUNTER — TELEPHONE (OUTPATIENT)
Dept: OBSTETRICS AND GYNECOLOGY | Facility: CLINIC | Age: 34
End: 2024-04-05
Payer: COMMERCIAL

## 2024-04-05 ENCOUNTER — TELEPHONE (OUTPATIENT)
Dept: OBSTETRICS AND GYNECOLOGY | Facility: CLINIC | Age: 34
End: 2024-04-05

## 2024-04-05 NOTE — TELEPHONE ENCOUNTER
LOS patient. Last OV 10/2022.     Patient reports history of menorrhagia, says for the past month she has been on her period. It has been very heavy to where she saturates pad/tampon in less than an hour. She reports she went to the ER yesterday but they did not do ultrasound. She reports she is having some vision changes and fatigued. Recommended patient be seen again in the ER. Follow up scheduled in our office with ultrasound. Patient v/u.

## 2024-04-05 NOTE — TELEPHONE ENCOUNTER
Patient called and said she wanted to talk to a nurse, said she is bleeding a lot and bleeding through a pad in a hr, but I had seen where she hadn't been seen here in a while

## 2024-04-09 ENCOUNTER — APPOINTMENT (OUTPATIENT)
Dept: CT IMAGING | Facility: HOSPITAL | Age: 34
End: 2024-04-09
Payer: COMMERCIAL

## 2024-04-09 ENCOUNTER — HOSPITAL ENCOUNTER (EMERGENCY)
Facility: HOSPITAL | Age: 34
Discharge: HOME | End: 2024-04-09
Attending: EMERGENCY MEDICINE | Admitting: EMERGENCY MEDICINE
Payer: COMMERCIAL

## 2024-04-09 VITALS
RESPIRATION RATE: 16 BRPM | OXYGEN SATURATION: 100 % | HEART RATE: 87 BPM | TEMPERATURE: 97.8 F | SYSTOLIC BLOOD PRESSURE: 98 MMHG | DIASTOLIC BLOOD PRESSURE: 60 MMHG | BODY MASS INDEX: 21.62 KG/M2 | WEIGHT: 122 LBS | HEIGHT: 63 IN

## 2024-04-09 DIAGNOSIS — N92.4 EXCESSIVE BLEEDING IN PREMENOPAUSAL PERIOD: Primary | ICD-10-CM

## 2024-04-09 DIAGNOSIS — N39.0 URINARY TRACT INFECTION WITH HEMATURIA, SITE UNSPECIFIED: Primary | ICD-10-CM

## 2024-04-09 DIAGNOSIS — R31.9 URINARY TRACT INFECTION WITH HEMATURIA, SITE UNSPECIFIED: Primary | ICD-10-CM

## 2024-04-09 LAB
ALBUMIN SERPL-MCNC: 3.8 G/DL (ref 3.5–5.2)
ALBUMIN/GLOB SERPL: 1.1 G/DL
ALP SERPL-CCNC: 71 U/L (ref 39–117)
ALT SERPL W P-5'-P-CCNC: 10 U/L (ref 1–33)
ANION GAP SERPL CALCULATED.3IONS-SCNC: 10.8 MMOL/L (ref 5–15)
AST SERPL-CCNC: 25 U/L (ref 1–32)
B-HCG UR QL: NEGATIVE
BACTERIA UR QL AUTO: ABNORMAL /HPF
BASOPHILS # BLD AUTO: 0.05 10*3/MM3 (ref 0–0.2)
BASOPHILS NFR BLD AUTO: 0.8 % (ref 0–1.5)
BILIRUB SERPL-MCNC: 0.3 MG/DL (ref 0–1.2)
BILIRUB UR QL STRIP: NEGATIVE
BUN SERPL-MCNC: 6 MG/DL (ref 6–20)
BUN/CREAT SERPL: 9 (ref 7–25)
CALCIUM SPEC-SCNC: 8.6 MG/DL (ref 8.6–10.5)
CHLORIDE SERPL-SCNC: 105 MMOL/L (ref 98–107)
CLARITY UR: CLEAR
CO2 SERPL-SCNC: 25.2 MMOL/L (ref 22–29)
COD CRY URNS QL: ABNORMAL /HPF
COLOR UR: YELLOW
CREAT SERPL-MCNC: 0.67 MG/DL (ref 0.57–1)
DEPRECATED RDW RBC AUTO: 41.6 FL (ref 37–54)
EGFRCR SERPLBLD CKD-EPI 2021: 117.8 ML/MIN/1.73
EOSINOPHIL # BLD AUTO: 0.14 10*3/MM3 (ref 0–0.4)
EOSINOPHIL NFR BLD AUTO: 2.1 % (ref 0.3–6.2)
ERYTHROCYTE [DISTWIDTH] IN BLOOD BY AUTOMATED COUNT: 12.9 % (ref 12.3–15.4)
GLOBULIN UR ELPH-MCNC: 3.4 GM/DL
GLUCOSE SERPL-MCNC: 83 MG/DL (ref 65–99)
GLUCOSE UR STRIP-MCNC: NEGATIVE MG/DL
HCT VFR BLD AUTO: 39.6 % (ref 34–46.6)
HGB BLD-MCNC: 12.8 G/DL (ref 12–15.9)
HGB UR QL STRIP.AUTO: ABNORMAL
HYALINE CASTS UR QL AUTO: ABNORMAL /LPF
IMM GRANULOCYTES # BLD AUTO: 0.03 10*3/MM3 (ref 0–0.05)
IMM GRANULOCYTES NFR BLD AUTO: 0.5 % (ref 0–0.5)
KETONES UR QL STRIP: NEGATIVE
LEUKOCYTE ESTERASE UR QL STRIP.AUTO: ABNORMAL
LYMPHOCYTES # BLD AUTO: 1.87 10*3/MM3 (ref 0.7–3.1)
LYMPHOCYTES NFR BLD AUTO: 28.1 % (ref 19.6–45.3)
MCH RBC QN AUTO: 28.8 PG (ref 26.6–33)
MCHC RBC AUTO-ENTMCNC: 32.3 G/DL (ref 31.5–35.7)
MCV RBC AUTO: 89 FL (ref 79–97)
MONOCYTES # BLD AUTO: 0.4 10*3/MM3 (ref 0.1–0.9)
MONOCYTES NFR BLD AUTO: 6 % (ref 5–12)
NEUTROPHILS NFR BLD AUTO: 4.17 10*3/MM3 (ref 1.7–7)
NEUTROPHILS NFR BLD AUTO: 62.5 % (ref 42.7–76)
NITRITE UR QL STRIP: NEGATIVE
NRBC BLD AUTO-RTO: 0 /100 WBC (ref 0–0.2)
PH UR STRIP.AUTO: 8.5 [PH] (ref 5–8)
PLATELET # BLD AUTO: 256 10*3/MM3 (ref 140–450)
PMV BLD AUTO: 8.5 FL (ref 6–12)
POTASSIUM SERPL-SCNC: 4.5 MMOL/L (ref 3.5–5.2)
PROT SERPL-MCNC: 7.2 G/DL (ref 6–8.5)
PROT UR QL STRIP: ABNORMAL
RBC # BLD AUTO: 4.45 10*6/MM3 (ref 3.77–5.28)
RBC # UR STRIP: ABNORMAL /HPF
REF LAB TEST METHOD: ABNORMAL
SODIUM SERPL-SCNC: 141 MMOL/L (ref 136–145)
SP GR UR STRIP: 1.02 (ref 1–1.03)
SQUAMOUS #/AREA URNS HPF: ABNORMAL /HPF
UROBILINOGEN UR QL STRIP: ABNORMAL
WBC # UR STRIP: ABNORMAL /HPF
WBC NRBC COR # BLD AUTO: 6.66 10*3/MM3 (ref 3.4–10.8)

## 2024-04-09 PROCEDURE — 25010000002 KETOROLAC TROMETHAMINE PER 15 MG: Performed by: NURSE PRACTITIONER

## 2024-04-09 PROCEDURE — 25010000002 CEFTRIAXONE PER 250 MG: Performed by: NURSE PRACTITIONER

## 2024-04-09 PROCEDURE — 74176 CT ABD & PELVIS W/O CONTRAST: CPT

## 2024-04-09 PROCEDURE — 96375 TX/PRO/DX INJ NEW DRUG ADDON: CPT

## 2024-04-09 PROCEDURE — 25810000003 SODIUM CHLORIDE 0.9 % SOLUTION: Performed by: NURSE PRACTITIONER

## 2024-04-09 PROCEDURE — 85025 COMPLETE CBC W/AUTO DIFF WBC: CPT | Performed by: NURSE PRACTITIONER

## 2024-04-09 PROCEDURE — 81025 URINE PREGNANCY TEST: CPT | Performed by: NURSE PRACTITIONER

## 2024-04-09 PROCEDURE — 99284 EMERGENCY DEPT VISIT MOD MDM: CPT

## 2024-04-09 PROCEDURE — 96361 HYDRATE IV INFUSION ADD-ON: CPT

## 2024-04-09 PROCEDURE — 80053 COMPREHEN METABOLIC PANEL: CPT | Performed by: NURSE PRACTITIONER

## 2024-04-09 PROCEDURE — 81001 URINALYSIS AUTO W/SCOPE: CPT | Performed by: NURSE PRACTITIONER

## 2024-04-09 PROCEDURE — 96365 THER/PROPH/DIAG IV INF INIT: CPT

## 2024-04-09 RX ORDER — KETOROLAC TROMETHAMINE 30 MG/ML
30 INJECTION, SOLUTION INTRAMUSCULAR; INTRAVENOUS ONCE
Status: COMPLETED | OUTPATIENT
Start: 2024-04-09 | End: 2024-04-09

## 2024-04-09 RX ORDER — CEFDINIR 300 MG/1
300 CAPSULE ORAL 2 TIMES DAILY
Qty: 14 CAPSULE | Refills: 0 | Status: SHIPPED | OUTPATIENT
Start: 2024-04-09 | End: 2024-04-16

## 2024-04-09 RX ADMIN — CEFTRIAXONE SODIUM 1000 MG: 1 INJECTION, POWDER, FOR SOLUTION INTRAMUSCULAR; INTRAVENOUS at 10:49

## 2024-04-09 RX ADMIN — KETOROLAC TROMETHAMINE 30 MG: 30 INJECTION, SOLUTION INTRAMUSCULAR; INTRAVENOUS at 09:21

## 2024-04-09 RX ADMIN — SODIUM CHLORIDE 1000 ML: 9 INJECTION, SOLUTION INTRAVENOUS at 09:21

## 2024-04-09 NOTE — ED PROVIDER NOTES
"Subjective:  History of Present Illness:    Patient is a 33-year-old female with history of kidney stone.  Presents to the ER today with left flank pain x 1 month but has worsened over the last 3 days.  Patient denies obvious blood in urine.  She denies shortness of breath or chest pain.  Denies OTC medication home remedy.  Denies alleviating exacerbating factors.    Nurses Notes reviewed and agree, including vitals, allergies, social history and prior medical history.     REVIEW OF SYSTEMS: All systems reviewed and not pertinent unless noted.  Review of Systems   Genitourinary:  Positive for flank pain.   All other systems reviewed and are negative.      Past Medical History:   Diagnosis Date    Anxiety     Arrhythmia     reported hx of \"arrhythmia\" by PCP. No cardiac eval.    Asthma     uses albuterol fairly often.    Bulimia nervosa     Chronic back pain     PRN tylenol    Depression     Diabetes mellitus     Dyspepsia     with greasy foods    Endometriosis determined by laparoscopy     History of 8-9 laparoscopies Dr. Evgeny Houser     tums PRN. No egd or h pylori    History of COVID-19 2021    History of uterine fibroid     ,     Hypertension     H/O    Migraine     Ovarian cyst     PCOS (polycystic ovarian syndrome)     Post partum depression 2016    Seasonal allergies        Allergies:    Iodine, Latex, Methylene blue, Mushroom extract complex, Percocet [oxycodone-acetaminophen], Codeine, and Shellfish-derived products      Past Surgical History:   Procedure Laterality Date     SECTION Bilateral 2017    Procedure:  SECTION REPEAT;  Surgeon: Cheikh Bonilla MD;  Location: [facility] LABOR DELIVERY;  Service:      SECTION N/A 10/01/2021    Procedure:  SECTION REPEAT;  Surgeon: Luzmaria Forbes MD;  Location: [facility] LABOR DELIVERY;  Service: Obstetrics/Gynecology;  Laterality: N/A;    DIAGNOSTIC LAPAROSCOPY      TIMES -9 for endometriosis; " "[id number]    MYOMECTOMY  2013, 2015         Social History     Socioeconomic History    Marital status:    Tobacco Use    Smoking status: Never    Smokeless tobacco: Never   Vaping Use    Vaping status: Never Used   Substance and Sexual Activity    Alcohol use: Yes     Alcohol/week: 1.0 standard drink of alcohol     Types: 1 Glasses of wine per week     Comment: occasional, glass of wine every couple weeks    Drug use: Never    Sexual activity: Yes     Partners: Male     Birth control/protection: Tubal ligation         Family History   Problem Relation Age of Onset    Cancer Mother     Heart disease Mother     Hypertension Mother     Diabetes Mother     Ovarian cancer Mother 36    Obesity Mother     Hypertension Father     Colon cancer Father 40    Sleep apnea Father     Obesity Sister     Obesity Sister     Drug abuse Brother     Hypertension Maternal Grandmother     Diabetes Maternal Grandmother     Obesity Maternal Grandmother     Cancer Paternal Grandmother     Osteoporosis Paternal Grandmother     Breast cancer Maternal Aunt 40    Ovarian cancer Other        Objective  Physical Exam:  /68 (BP Location: Left arm, Patient Position: Sitting)   Pulse 84   Temp 97.8 °F (36.6 °C) (Oral)   Resp 16   Ht 160 cm (63\")   Wt 55.3 kg (122 lb)   LMP 04/01/2024   SpO2 95%   BMI 21.61 kg/m²      Physical Exam  Vitals and nursing note reviewed.   Constitutional:       Appearance: Normal appearance. She is normal weight.   HENT:      Head: Normocephalic.      Nose: Nose normal.      Mouth/Throat:      Mouth: Mucous membranes are moist.      Pharynx: Oropharynx is clear.   Eyes:      Extraocular Movements: Extraocular movements intact.      Conjunctiva/sclera: Conjunctivae normal.      Pupils: Pupils are equal, round, and reactive to light.   Cardiovascular:      Rate and Rhythm: Normal rate and regular rhythm.   Pulmonary:      Effort: Pulmonary effort is normal.      Breath sounds: Normal breath sounds. "   Abdominal:      General: Abdomen is flat. Bowel sounds are normal.      Palpations: Abdomen is soft.   Musculoskeletal:         General: Normal range of motion.      Cervical back: Normal range of motion and neck supple.   Skin:     General: Skin is warm and dry.      Capillary Refill: Capillary refill takes less than 2 seconds.   Neurological:      General: No focal deficit present.      Mental Status: She is alert and oriented to person, place, and time. Mental status is at baseline.   Psychiatric:         Mood and Affect: Mood normal.         Behavior: Behavior normal.         Thought Content: Thought content normal.         Judgment: Judgment normal.         Procedures    ED Course:    ED Course as of 04/09/24 1125   Tue Apr 09, 2024   1036 Patient presenting with left-sided flank pain, has a history of nephrolithiasis.  High concern for nephrolithiasis this patient, plan for CT scan without contrast.  Plan to evaluate urine for infected kidney stone.  Plan to treat with ceftriaxone, most likely discharge on antibiotics. [CR]      ED Course User Index  [CR] Duncan Tinajero,        Lab Results (last 24 hours)       Procedure Component Value Units Date/Time    CBC & Differential [[id number]]  (Normal) Collected: 04/09/24 0920    Specimen: Blood Updated: 04/09/24 0930    Narrative:      The following orders were created for panel order CBC & Differential.  Procedure                               Abnormality         Status                     ---------                               -----------         ------                     CBC Auto Differential[[id number]]        Normal              Final result                 Please view results for these tests on the individual orders.    Comprehensive Metabolic Panel [[id number]] Collected: 04/09/24 0920    Specimen: Blood Updated: 04/09/24 1019     Glucose 83 mg/dL      BUN 6 mg/dL      Creatinine 0.67 mg/dL      Sodium 141 mmol/L      Potassium 4.5 mmol/L       Comment: Slight hemolysis detected by analyzer. Result may be falsely elevated.        Chloride 105 mmol/L      CO2 25.2 mmol/L      Calcium 8.6 mg/dL      Total Protein 7.2 g/dL      Albumin 3.8 g/dL      ALT (SGPT) 10 U/L      AST (SGOT) 25 U/L      Comment: Slight hemolysis detected by analyzer. Result may be falsely elevated.        Alkaline Phosphatase 71 U/L      Total Bilirubin 0.3 mg/dL      Globulin 3.4 gm/dL      A/G Ratio 1.1 g/dL      BUN/Creatinine Ratio 9.0     Anion Gap 10.8 mmol/L      eGFR 117.8 mL/min/1.73     Narrative:      GFR Normal >60  Chronic Kidney Disease <60  Kidney Failure <15      Urinalysis With Microscopic If Indicated (No Culture) - Urine, Clean Catch [[id number]]  (Abnormal) Collected: 04/09/24 0920    Specimen: Urine, Clean Catch Updated: 04/09/24 0935     Color, UA Yellow     Appearance, UA Clear     pH, UA 8.5     Specific Gravity, UA 1.020     Glucose, UA Negative     Ketones, UA Negative     Bilirubin, UA Negative     Blood, UA Small (1+)     Protein, UA Trace     Leuk Esterase, UA Small (1+)     Nitrite, UA Negative     Urobilinogen, UA 0.2 E.U./dL    Pregnancy, Urine - Urine, Clean Catch [[id number]]  (Normal) Collected: 04/09/24 0920    Specimen: Urine, Clean Catch Updated: 04/09/24 0935     HCG, Urine QL Negative    CBC Auto Differential [[id number]]  (Normal) Collected: 04/09/24 0920    Specimen: Blood Updated: 04/09/24 0930     WBC 6.66 10*3/mm3      RBC 4.45 10*6/mm3      Hemoglobin 12.8 g/dL      Hematocrit 39.6 %      MCV 89.0 fL      MCH 28.8 pg      MCHC 32.3 g/dL      RDW 12.9 %      RDW-SD 41.6 fl      MPV 8.5 fL      Platelets 256 10*3/mm3      Neutrophil % 62.5 %      Lymphocyte % 28.1 %      Monocyte % 6.0 %      Eosinophil % 2.1 %      Basophil % 0.8 %      Immature Grans % 0.5 %      Neutrophils, Absolute 4.17 10*3/mm3      Lymphocytes, Absolute 1.87 10*3/mm3      Monocytes, Absolute 0.40 10*3/mm3      Eosinophils, Absolute 0.14 10*3/mm3      Basophils, Absolute  0.05 10*3/mm3      Immature Grans, Absolute 0.03 10*3/mm3      nRBC 0.0 /100 WBC     Urinalysis, Microscopic Only - Urine, Clean Catch [[id number]]  (Abnormal) Collected: 04/09/24 0920    Specimen: Urine, Clean Catch Updated: 04/09/24 0945     RBC, UA 3-5 /HPF      WBC, UA 6-10 /HPF      Bacteria, UA 2+ /HPF      Squamous Epithelial Cells, UA 7-12 /HPF      Hyaline Casts, UA None Seen /LPF      Calcium Oxalate Crystals, UA Small/1+ /HPF      Methodology Manual Light Microscopy             CT Abdomen Pelvis Without Contrast    Result Date: 4/9/2024  PROCEDURE: CT ABDOMEN PELVIS WO CONTRAST-  HISTORY: Rule out kidney stone  COMPARISON: None .  PROCEDURE: Axial images were obtained from the lung bases through the pubic symphysis without intravenous contrast. .  FINDINGS:  ABDOMEN: The lung bases are clear. The heart size is normal. The limited noncontrast images of the liver are normal. The gallbladder is small. There is no CT visualized gallstone. The spleen is normal. No adrenal masses are seen.  The pancreas has an unremarkable unenhanced appearance.. The aorta is normal in caliber. There is no significant free fluid or adenopathy. There are nonobstructing stones in the inferior poles of the kidneys bilaterally. There is no hydronephrosis. There is a paucity of intra-abdominal fat. Limited noncontrast images of the bowel are unremarkable.  PELVIS: The appendix is not identified. There is a moderate stool burden. The uterus is midline. There are calcified phleboliths in the pelvis. The urinary bladder is unremarkable. There is no significant fluid or adenopathy.      Impression: Bilateral nephrolithiasis. No hydronephrosis.  Constipation.    CTDI: 4.3 mGy DLP: 201.26 mGy.cm   This study was performed with techniques to keep radiation doses as low as reasonably achievable (ALARA). Individualized dose reduction techniques using automated exposure control or adjustment of mA and/or kV according to the patient size were  employed.     Images were reviewed, interpreted, and dictated by Dr. Aurea Lares MD Transcribed by Jovita Sultana PA-C.  This report was signed and finalized on 4/9/2024 10:51 AM by Aurea Lares MD.          MDM      Initial impression of presenting illness: Patient is a 33-year-old female with history of kidney stone.  Presents to the ER today with left flank pain x 1 month but has worsened over the last 3 days.  Patient denies obvious blood in urine.  She denies shortness of breath or chest pain.  Denies OTC medication home remedy.  Denies alleviating exacerbating factors.    DDX: includes but is not limited to: UTI, acute cystitis, renal stone, ovarian cyst or other    Patient arrives stable with vitals interpreted by myself.     Pertinent features from physical exam: Lung sounds are clear bilaterally throughout.  Abdomen soft not.    Initial diagnostic plan: CBC, CMP, urine pregnancy, urinalysis    Results from initial plan were reviewed and interpreted by me revealing CBC is within normal parameters.  CMP is within normal parameters.  Urine pregnancy was negative.  Urinalysis positive for 2+ bacteria most consistent with urinary tract infection.  CT abdomen pelvis with the following impression bilateral nephrolithiasis.  No hydronephrosis.  Constipation    Diagnostic information from other sources: Chart review    Interventions / Re-evaluation: Vital signs stable throughout encounter.  Patient received 1 L normal saline and 30 mg of Toradol    Results/clinical rationale were discussed with patient    Consultations/Discussion of results with other physicians: N/A    Disposition plan: Patient is hemodynamically stable nontoxic-appearing appropriate discharge.  Will send patient home with cefdinir for urinary tract infection.  Will have her follow-up with her PCP within the week.  She reports that she does also have a OB/GYN appointment tomorrow for follow-up as well.  -----        Final diagnoses:   Urinary  tract infection with hematuria, site unspecified          Harrison Pagan, APRN  04/09/24 1121

## 2024-04-09 NOTE — Clinical Note
[facility] EMERGENCY DEPARTMENT  [address] [address]  Phone: [phone]    Gunjan Engel was seen and treated in our emergency department on 4/9/2024.  She may return to work on 04/10/2024.         Thank you for choosing [facility].    Duncan Tinajero, DO

## 2024-04-15 ENCOUNTER — APPOINTMENT (OUTPATIENT)
Dept: CT IMAGING | Facility: HOSPITAL | Age: 34
End: 2024-04-15
Payer: COMMERCIAL

## 2024-04-15 ENCOUNTER — HOSPITAL ENCOUNTER (EMERGENCY)
Facility: HOSPITAL | Age: 34
Discharge: HOME | End: 2024-04-15
Attending: STUDENT IN AN ORGANIZED HEALTH CARE EDUCATION/TRAINING PROGRAM | Admitting: STUDENT IN AN ORGANIZED HEALTH CARE EDUCATION/TRAINING PROGRAM
Payer: COMMERCIAL

## 2024-04-15 VITALS
RESPIRATION RATE: 16 BRPM | WEIGHT: 121.91 LBS | TEMPERATURE: 98 F | SYSTOLIC BLOOD PRESSURE: 122 MMHG | OXYGEN SATURATION: 100 % | HEART RATE: 82 BPM | HEIGHT: 63 IN | BODY MASS INDEX: 21.6 KG/M2 | DIASTOLIC BLOOD PRESSURE: 80 MMHG
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
## 2024-04-15 DIAGNOSIS — M54.50 ACUTE BILATERAL LOW BACK PAIN WITHOUT SCIATICA: ICD-10-CM

## 2024-04-15 DIAGNOSIS — R51.9 ACUTE NONINTRACTABLE HEADACHE, UNSPECIFIED HEADACHE TYPE: Primary | ICD-10-CM

## 2024-04-15 LAB — B-HCG UR QL: NEGATIVE

## 2024-04-15 PROCEDURE — 70450 CT HEAD/BRAIN W/O DYE: CPT

## 2024-04-15 PROCEDURE — 72125 CT NECK SPINE W/O DYE: CPT

## 2024-04-15 PROCEDURE — 81025 URINE PREGNANCY TEST: CPT | Performed by: STUDENT IN AN ORGANIZED HEALTH CARE EDUCATION/TRAINING PROGRAM

## 2024-04-15 PROCEDURE — 72131 CT LUMBAR SPINE W/O DYE: CPT

## 2024-04-15 PROCEDURE — 99284 EMERGENCY DEPT VISIT MOD MDM: CPT

## 2024-04-15 RX ORDER — NAPROXEN 500 MG/1
500 TABLET ORAL 2 TIMES DAILY PRN
Qty: 14 TABLET | Refills: 0 | Status: SHIPPED | OUTPATIENT
Start: 2024-04-15

## 2024-04-15 RX ORDER — NAPROXEN 500 MG/1
500 TABLET ORAL ONCE
Status: COMPLETED | OUTPATIENT
Start: 2024-04-15 | End: 2024-04-15

## 2024-04-15 RX ADMIN — NAPROXEN 500 MG: 500 TABLET ORAL at 10:10
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
## 2024-04-15 NOTE — Clinical Note
[facility] EMERGENCY DEPARTMENT  [address] [address]  Phone: [phone]    Gunjan Engel was seen and treated in our emergency department on 4/15/2024.  She may return to work on 04/18/2024.         Thank you for choosing [facility].    Dmitri Reilly MD

## 2024-04-23 ENCOUNTER — HOSPITAL ENCOUNTER (EMERGENCY)
Facility: HOSPITAL | Age: 34
Discharge: HOME | End: 2024-04-24
Attending: EMERGENCY MEDICINE
Payer: COMMERCIAL

## 2024-04-23 DIAGNOSIS — F43.0 PANIC ATTACK AS REACTION TO STRESS: Primary | ICD-10-CM

## 2024-04-23 DIAGNOSIS — F41.0 PANIC ATTACK AS REACTION TO STRESS: Primary | ICD-10-CM

## 2024-04-23 DIAGNOSIS — F10.920 ALCOHOLIC INTOXICATION WITHOUT COMPLICATION: ICD-10-CM

## 2024-04-23 LAB
ALBUMIN SERPL-MCNC: 4.2 G/DL (ref 3.5–5.2)
ALBUMIN/GLOB SERPL: 1.3 G/DL
ALP SERPL-CCNC: 60 U/L (ref 39–117)
ALT SERPL W P-5'-P-CCNC: 14 U/L (ref 1–33)
AMPHET+METHAMPHET UR QL: POSITIVE
AMPHETAMINES UR QL: NEGATIVE
ANION GAP SERPL CALCULATED.3IONS-SCNC: 11.6 MMOL/L (ref 5–15)
APAP SERPL-MCNC: <5 MCG/ML (ref 0–30)
AST SERPL-CCNC: 19 U/L (ref 1–32)
BACTERIA UR QL AUTO: ABNORMAL /HPF
BARBITURATES UR QL SCN: NEGATIVE
BASOPHILS # BLD AUTO: 0.06 10*3/MM3 (ref 0–0.2)
BASOPHILS NFR BLD AUTO: 0.7 % (ref 0–1.5)
BENZODIAZ UR QL SCN: NEGATIVE
BILIRUB SERPL-MCNC: 0.5 MG/DL (ref 0–1.2)
BILIRUB UR QL STRIP: NEGATIVE
BUN SERPL-MCNC: 5 MG/DL (ref 6–20)
BUN/CREAT SERPL: 6.8 (ref 7–25)
BUPRENORPHINE SERPL-MCNC: NEGATIVE NG/ML
CALCIUM SPEC-SCNC: 9.2 MG/DL (ref 8.6–10.5)
CANNABINOIDS SERPL QL: NEGATIVE
CHLORIDE SERPL-SCNC: 104 MMOL/L (ref 98–107)
CLARITY UR: CLEAR
CO2 SERPL-SCNC: 24.4 MMOL/L (ref 22–29)
COCAINE UR QL: NEGATIVE
COLOR UR: YELLOW
CREAT SERPL-MCNC: 0.74 MG/DL (ref 0.57–1)
DEPRECATED RDW RBC AUTO: 41 FL (ref 37–54)
EGFRCR SERPLBLD CKD-EPI 2021: 109 ML/MIN/1.73
EOSINOPHIL # BLD AUTO: 0.06 10*3/MM3 (ref 0–0.4)
EOSINOPHIL NFR BLD AUTO: 0.7 % (ref 0.3–6.2)
ERYTHROCYTE [DISTWIDTH] IN BLOOD BY AUTOMATED COUNT: 12.9 % (ref 12.3–15.4)
ETHANOL BLD-MCNC: 162 MG/DL (ref 0–10)
ETHANOL UR QL: 0.16 %
FENTANYL UR-MCNC: NEGATIVE NG/ML
GLOBULIN UR ELPH-MCNC: 3.2 GM/DL
GLUCOSE SERPL-MCNC: 78 MG/DL (ref 65–99)
GLUCOSE UR STRIP-MCNC: NEGATIVE MG/DL
HCT VFR BLD AUTO: 42.4 % (ref 34–46.6)
HGB BLD-MCNC: 13.8 G/DL (ref 12–15.9)
HGB UR QL STRIP.AUTO: NEGATIVE
HOLD SPECIMEN: NORMAL
HYALINE CASTS UR QL AUTO: ABNORMAL /LPF
IMM GRANULOCYTES # BLD AUTO: 0.01 10*3/MM3 (ref 0–0.05)
IMM GRANULOCYTES NFR BLD AUTO: 0.1 % (ref 0–0.5)
KETONES UR QL STRIP: NEGATIVE
LEUKOCYTE ESTERASE UR QL STRIP.AUTO: ABNORMAL
LYMPHOCYTES # BLD AUTO: 3.9 10*3/MM3 (ref 0.7–3.1)
LYMPHOCYTES NFR BLD AUTO: 45.1 % (ref 19.6–45.3)
MCH RBC QN AUTO: 28.5 PG (ref 26.6–33)
MCHC RBC AUTO-ENTMCNC: 32.5 G/DL (ref 31.5–35.7)
MCV RBC AUTO: 87.6 FL (ref 79–97)
METHADONE UR QL SCN: NEGATIVE
MONOCYTES # BLD AUTO: 0.49 10*3/MM3 (ref 0.1–0.9)
MONOCYTES NFR BLD AUTO: 5.7 % (ref 5–12)
NEUTROPHILS NFR BLD AUTO: 4.12 10*3/MM3 (ref 1.7–7)
NEUTROPHILS NFR BLD AUTO: 47.7 % (ref 42.7–76)
NITRITE UR QL STRIP: NEGATIVE
NRBC BLD AUTO-RTO: 0 /100 WBC (ref 0–0.2)
OPIATES UR QL: NEGATIVE
OXYCODONE UR QL SCN: NEGATIVE
PCP UR QL SCN: NEGATIVE
PH UR STRIP.AUTO: 6.5 [PH] (ref 5–8)
PLATELET # BLD AUTO: 286 10*3/MM3 (ref 140–450)
PMV BLD AUTO: 9.2 FL (ref 6–12)
POTASSIUM SERPL-SCNC: 3.3 MMOL/L (ref 3.5–5.2)
PROT SERPL-MCNC: 7.4 G/DL (ref 6–8.5)
PROT UR QL STRIP: NEGATIVE
RBC # BLD AUTO: 4.84 10*6/MM3 (ref 3.77–5.28)
RBC # UR STRIP: ABNORMAL /HPF
REF LAB TEST METHOD: ABNORMAL
SALICYLATES SERPL-MCNC: <0.3 MG/DL
SODIUM SERPL-SCNC: 140 MMOL/L (ref 136–145)
SP GR UR STRIP: <=1.005 (ref 1–1.03)
SQUAMOUS #/AREA URNS HPF: ABNORMAL /HPF
TRICYCLICS UR QL SCN: NEGATIVE
UROBILINOGEN UR QL STRIP: ABNORMAL
WBC # UR STRIP: ABNORMAL /HPF
WBC NRBC COR # BLD AUTO: 8.64 10*3/MM3 (ref 3.4–10.8)
WHOLE BLOOD HOLD COAG: NORMAL
WHOLE BLOOD HOLD SPECIMEN: NORMAL

## 2024-04-23 PROCEDURE — 25010000002 LORAZEPAM PER 2 MG: Performed by: EMERGENCY MEDICINE

## 2024-04-23 PROCEDURE — 96375 TX/PRO/DX INJ NEW DRUG ADDON: CPT

## 2024-04-23 PROCEDURE — 82077 ASSAY SPEC XCP UR&BREATH IA: CPT | Performed by: EMERGENCY MEDICINE

## 2024-04-23 PROCEDURE — 25010000002 THIAMINE PER 100 MG: Performed by: EMERGENCY MEDICINE

## 2024-04-23 PROCEDURE — 25810000003 SODIUM CHLORIDE 0.9 % SOLUTION: Performed by: EMERGENCY MEDICINE

## 2024-04-23 PROCEDURE — 99283 EMERGENCY DEPT VISIT LOW MDM: CPT

## 2024-04-23 PROCEDURE — 85025 COMPLETE CBC W/AUTO DIFF WBC: CPT | Performed by: EMERGENCY MEDICINE

## 2024-04-23 PROCEDURE — 80179 DRUG ASSAY SALICYLATE: CPT | Performed by: EMERGENCY MEDICINE

## 2024-04-23 PROCEDURE — 81001 URINALYSIS AUTO W/SCOPE: CPT | Performed by: EMERGENCY MEDICINE

## 2024-04-23 PROCEDURE — 80053 COMPREHEN METABOLIC PANEL: CPT | Performed by: EMERGENCY MEDICINE

## 2024-04-23 PROCEDURE — 96365 THER/PROPH/DIAG IV INF INIT: CPT

## 2024-04-23 PROCEDURE — 80143 DRUG ASSAY ACETAMINOPHEN: CPT | Performed by: EMERGENCY MEDICINE

## 2024-04-23 PROCEDURE — 80307 DRUG TEST PRSMV CHEM ANLYZR: CPT | Performed by: EMERGENCY MEDICINE

## 2024-04-23 RX ORDER — LORAZEPAM 2 MG/ML
1 INJECTION INTRAMUSCULAR ONCE
Status: COMPLETED | OUTPATIENT
Start: 2024-04-23 | End: 2024-04-23

## 2024-04-23 RX ORDER — SODIUM CHLORIDE 9 MG/ML
1000 INJECTION, SOLUTION INTRAVENOUS ONCE
Status: COMPLETED | OUTPATIENT
Start: 2024-04-23 | End: 2024-04-24

## 2024-04-23 RX ORDER — THIAMINE HYDROCHLORIDE 100 MG/ML
200 INJECTION, SOLUTION INTRAMUSCULAR; INTRAVENOUS ONCE
Status: COMPLETED | OUTPATIENT
Start: 2024-04-23 | End: 2024-04-23

## 2024-04-23 RX ORDER — SODIUM CHLORIDE 0.9 % (FLUSH) 0.9 %
10 SYRINGE (ML) INJECTION AS NEEDED
Status: DISCONTINUED | OUTPATIENT
Start: 2024-04-23 | End: 2024-04-24 | Stop reason: HOSPADM

## 2024-04-23 RX ADMIN — SODIUM CHLORIDE 1000 ML: 9 INJECTION, SOLUTION INTRAVENOUS at 21:11

## 2024-04-23 RX ADMIN — SODIUM CHLORIDE 1000 ML: 9 INJECTION, SOLUTION INTRAVENOUS at 23:37

## 2024-04-23 RX ADMIN — LORAZEPAM 1 MG: 2 INJECTION, SOLUTION INTRAMUSCULAR; INTRAVENOUS at 21:08

## 2024-04-23 RX ADMIN — FOLIC ACID 1 MG: 5 INJECTION, SOLUTION INTRAMUSCULAR; INTRAVENOUS; SUBCUTANEOUS at 23:38

## 2024-04-23 RX ADMIN — THIAMINE HYDROCHLORIDE 200 MG: 100 INJECTION, SOLUTION INTRAMUSCULAR; INTRAVENOUS at 23:35

## 2024-04-24 VITALS
OXYGEN SATURATION: 99 % | WEIGHT: 125 LBS | HEART RATE: 98 BPM | RESPIRATION RATE: 20 BRPM | BODY MASS INDEX: 22.15 KG/M2 | SYSTOLIC BLOOD PRESSURE: 105 MMHG | TEMPERATURE: 98.4 F | DIASTOLIC BLOOD PRESSURE: 66 MMHG | HEIGHT: 63 IN

## 2024-04-24 LAB
ETHANOL BLD-MCNC: 91 MG/DL (ref 0–10)
ETHANOL UR QL: 0.09 %
HOLD SPECIMEN: NORMAL

## 2024-04-24 PROCEDURE — 82077 ASSAY SPEC XCP UR&BREATH IA: CPT | Performed by: EMERGENCY MEDICINE
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
## 2024-04-24 NOTE — DISCHARGE INSTRUCTIONS
Follow-up closely with primary care provider and also recommend following up with therapist/psychiatry as an outpatient.  Do not drink alcohol to excess.

## 2024-05-09 ENCOUNTER — HOSPITAL ENCOUNTER (EMERGENCY)
Facility: HOSPITAL | Age: 34
Discharge: HOME | End: 2024-05-09
Attending: EMERGENCY MEDICINE
Payer: COMMERCIAL

## 2024-05-09 ENCOUNTER — APPOINTMENT (OUTPATIENT)
Dept: CT IMAGING | Facility: HOSPITAL | Age: 34
End: 2024-05-09
Payer: COMMERCIAL

## 2024-05-09 VITALS
SYSTOLIC BLOOD PRESSURE: 110 MMHG | RESPIRATION RATE: 18 BRPM | OXYGEN SATURATION: 98 % | TEMPERATURE: 98.6 F | DIASTOLIC BLOOD PRESSURE: 70 MMHG | BODY MASS INDEX: 20.83 KG/M2 | HEART RATE: 80 BPM | HEIGHT: 64 IN | WEIGHT: 122 LBS

## 2024-05-09 DIAGNOSIS — R11.2 NAUSEA AND VOMITING, UNSPECIFIED VOMITING TYPE: Primary | ICD-10-CM

## 2024-05-09 LAB
ALBUMIN SERPL-MCNC: 3.8 G/DL (ref 3.5–5.2)
ALBUMIN/GLOB SERPL: 1.2 G/DL
ALP SERPL-CCNC: 61 U/L (ref 39–117)
ALT SERPL W P-5'-P-CCNC: 11 U/L (ref 1–33)
ANION GAP SERPL CALCULATED.3IONS-SCNC: 7 MMOL/L (ref 5–15)
AST SERPL-CCNC: 14 U/L (ref 1–32)
B-HCG UR QL: NEGATIVE
BACTERIA UR QL AUTO: ABNORMAL /HPF
BASOPHILS # BLD AUTO: 0.01 10*3/MM3 (ref 0–0.2)
BASOPHILS NFR BLD AUTO: 0.2 % (ref 0–1.5)
BILIRUB SERPL-MCNC: 0.6 MG/DL (ref 0–1.2)
BILIRUB UR QL STRIP: NEGATIVE
BUN SERPL-MCNC: 6 MG/DL (ref 6–20)
BUN/CREAT SERPL: 7.9 (ref 7–25)
CALCIUM SPEC-SCNC: 8.8 MG/DL (ref 8.6–10.5)
CHLORIDE SERPL-SCNC: 104 MMOL/L (ref 98–107)
CLARITY UR: ABNORMAL
CO2 SERPL-SCNC: 26 MMOL/L (ref 22–29)
COLOR UR: YELLOW
CREAT SERPL-MCNC: 0.76 MG/DL (ref 0.57–1)
DEPRECATED RDW RBC AUTO: 39.4 FL (ref 37–54)
EGFRCR SERPLBLD CKD-EPI 2021: 105.6 ML/MIN/1.73
EOSINOPHIL # BLD AUTO: 0.04 10*3/MM3 (ref 0–0.4)
EOSINOPHIL NFR BLD AUTO: 0.8 % (ref 0.3–6.2)
ERYTHROCYTE [DISTWIDTH] IN BLOOD BY AUTOMATED COUNT: 12.3 % (ref 12.3–15.4)
EXPIRATION DATE: NORMAL
GLOBULIN UR ELPH-MCNC: 3.1 GM/DL
GLUCOSE SERPL-MCNC: 83 MG/DL (ref 65–99)
GLUCOSE UR STRIP-MCNC: NEGATIVE MG/DL
HCT VFR BLD AUTO: 39.2 % (ref 34–46.6)
HGB BLD-MCNC: 12.8 G/DL (ref 12–15.9)
HGB UR QL STRIP.AUTO: NEGATIVE
HYALINE CASTS UR QL AUTO: ABNORMAL /LPF
IMM GRANULOCYTES # BLD AUTO: 0 10*3/MM3 (ref 0–0.05)
IMM GRANULOCYTES NFR BLD AUTO: 0 % (ref 0–0.5)
INTERNAL NEGATIVE CONTROL: NEGATIVE
INTERNAL POSITIVE CONTROL: POSITIVE
KETONES UR QL STRIP: NEGATIVE
LEUKOCYTE ESTERASE UR QL STRIP.AUTO: NEGATIVE
LIPASE SERPL-CCNC: 20 U/L (ref 13–60)
LYMPHOCYTES # BLD AUTO: 1.22 10*3/MM3 (ref 0.7–3.1)
LYMPHOCYTES NFR BLD AUTO: 24.6 % (ref 19.6–45.3)
Lab: NORMAL
MCH RBC QN AUTO: 28.6 PG (ref 26.6–33)
MCHC RBC AUTO-ENTMCNC: 32.7 G/DL (ref 31.5–35.7)
MCV RBC AUTO: 87.5 FL (ref 79–97)
MONOCYTES # BLD AUTO: 0.48 10*3/MM3 (ref 0.1–0.9)
MONOCYTES NFR BLD AUTO: 9.7 % (ref 5–12)
NEUTROPHILS NFR BLD AUTO: 3.2 10*3/MM3 (ref 1.7–7)
NEUTROPHILS NFR BLD AUTO: 64.7 % (ref 42.7–76)
NITRITE UR QL STRIP: NEGATIVE
NRBC BLD AUTO-RTO: 0 /100 WBC (ref 0–0.2)
PH UR STRIP.AUTO: 6 [PH] (ref 5–8)
PLATELET # BLD AUTO: 213 10*3/MM3 (ref 140–450)
PMV BLD AUTO: 8.8 FL (ref 6–12)
POTASSIUM SERPL-SCNC: 4.1 MMOL/L (ref 3.5–5.2)
PROT SERPL-MCNC: 6.9 G/DL (ref 6–8.5)
PROT UR QL STRIP: NEGATIVE
RBC # BLD AUTO: 4.48 10*6/MM3 (ref 3.77–5.28)
RBC # UR STRIP: ABNORMAL /HPF
REF LAB TEST METHOD: ABNORMAL
SODIUM SERPL-SCNC: 137 MMOL/L (ref 136–145)
SP GR UR STRIP: <=1.005 (ref 1–1.03)
SQUAMOUS #/AREA URNS HPF: ABNORMAL /HPF
UROBILINOGEN UR QL STRIP: ABNORMAL
WBC # UR STRIP: ABNORMAL /HPF
WBC NRBC COR # BLD AUTO: 4.95 10*3/MM3 (ref 3.4–10.8)

## 2024-05-09 PROCEDURE — 80053 COMPREHEN METABOLIC PANEL: CPT

## 2024-05-09 PROCEDURE — 83690 ASSAY OF LIPASE: CPT

## 2024-05-09 PROCEDURE — 85025 COMPLETE CBC W/AUTO DIFF WBC: CPT

## 2024-05-09 PROCEDURE — 96374 THER/PROPH/DIAG INJ IV PUSH: CPT

## 2024-05-09 PROCEDURE — 81025 URINE PREGNANCY TEST: CPT

## 2024-05-09 PROCEDURE — 25010000002 DROPERIDOL PER 5 MG

## 2024-05-09 PROCEDURE — 74176 CT ABD & PELVIS W/O CONTRAST: CPT

## 2024-05-09 PROCEDURE — 81001 URINALYSIS AUTO W/SCOPE: CPT

## 2024-05-09 PROCEDURE — 99284 EMERGENCY DEPT VISIT MOD MDM: CPT

## 2024-05-09 PROCEDURE — 25810000003 SODIUM CHLORIDE 0.9 % SOLUTION

## 2024-05-09 RX ORDER — DROPERIDOL 2.5 MG/ML
2.5 INJECTION, SOLUTION INTRAMUSCULAR; INTRAVENOUS ONCE
Status: COMPLETED | OUTPATIENT
Start: 2024-05-09 | End: 2024-05-09

## 2024-05-09 RX ORDER — KETOROLAC TROMETHAMINE 15 MG/ML
15 INJECTION, SOLUTION INTRAMUSCULAR; INTRAVENOUS ONCE
Status: DISCONTINUED | OUTPATIENT
Start: 2024-05-09 | End: 2024-05-09 | Stop reason: HOSPADM

## 2024-05-09 RX ORDER — SODIUM CHLORIDE 0.9 % (FLUSH) 0.9 %
10 SYRINGE (ML) INJECTION AS NEEDED
Status: DISCONTINUED | OUTPATIENT
Start: 2024-05-09 | End: 2024-05-09 | Stop reason: HOSPADM

## 2024-05-09 RX ADMIN — DROPERIDOL 2.5 MG: 2.5 INJECTION, SOLUTION INTRAMUSCULAR; INTRAVENOUS at 11:46

## 2024-05-09 RX ADMIN — SODIUM CHLORIDE 1000 ML: 9 INJECTION, SOLUTION INTRAVENOUS at 11:47

## 2024-05-09 NOTE — Clinical Note
[facility] EMERGENCY DEPARTMENT  [address]  [facility] [address]  Phone: [phone]    Gunjan Engel was seen and treated in our emergency department on 5/9/2024.  She may return to work on 05/10/2024.         Thank you for choosing [facility].    Pascual Corea MD

## 2024-05-09 NOTE — ED PROVIDER NOTES
"Subjective   History of Present Illness patient is a 33-year-old female who reports that she has been experiencing abdominal pain, accompanied by nausea, vomiting, diarrhea for the past 4 to 5 days.  She reports she is a  and has been traveling in winding and curvy roads in [facility], working on guardianship cases, and she often feels carsick and this type of environment, but is also concerned that her own children at home could have been the cause for a contagious diarrheal illness.  Patient reports that she has anxiety and often causes these type of abdominal symptoms, but went to an outside hospital emergency department yesterday, was given IV fluids and the prescriptions that have not relieved the symptoms as of yet.  Reports previous  sections, endometrial procedures, but no other abdominal surgeries and is concerned about an appendicitis.  Describes the pain as emanating from her periumbilical area, radiating to both lower quadrants as well as the right upper quadrant area.  The patient reports missing work yesterday and today.    Review of Systems   Constitutional: Negative.    HENT: Negative.     Respiratory: Negative.     Cardiovascular: Negative.    Gastrointestinal:  Positive for abdominal pain, diarrhea, nausea and vomiting.   Genitourinary: Negative.    Musculoskeletal: Negative.    Skin: Negative.    Neurological: Negative.    Psychiatric/Behavioral:  The patient is nervous/anxious.        Past Medical History:   Diagnosis Date    Anxiety     Arrhythmia     reported hx of \"arrhythmia\" by PCP. No cardiac eval.    Asthma     uses albuterol fairly often.    Bulimia nervosa     Chronic back pain     PRN tylenol    Depression     Diabetes mellitus     Dyspepsia     with greasy foods    Endometriosis determined by laparoscopy     History of 8-9 laparoscopies Dr. Evgeny Houser     tums PRN. No egd or h pylori    History of COVID-19 2021    History of " uterine fibroid     ,     Hypertension 2009    H/O    Migraine     Ovarian cyst     PCOS (polycystic ovarian syndrome)     Post partum depression 2016    Seasonal allergies        Allergies   Allergen Reactions    Iodine Anaphylaxis    Latex Anaphylaxis    Methylene Blue Anaphylaxis    Mushroom Extract Complex Anaphylaxis    Percocet [Oxycodone-Acetaminophen] Anaphylaxis    Codeine Other (See Comments)    Shellfish-Derived Products Other (See Comments)     Unsure        Past Surgical History:   Procedure Laterality Date     SECTION Bilateral 2017    Procedure:  SECTION REPEAT;  Surgeon: Cheikh Bonilla MD;  Location:  [facility];  Service:      SECTION N/A 10/01/2021    Procedure:  SECTION REPEAT;  Surgeon: Luzmaria Forbes MD;  Location:  [facility];  Service: Obstetrics/Gynecology;  Laterality: N/A;    DIAGNOSTIC LAPAROSCOPY      TIMES - for endometriosis; 9481-2041    MYOMECTOMY  2015       Family History   Problem Relation Age of Onset    Cancer Mother     Heart disease Mother     Hypertension Mother     Diabetes Mother     Ovarian cancer Mother 36    Obesity Mother     Hypertension Father     Colon cancer Father 40    Sleep apnea Father     Obesity Sister     Obesity Sister     Drug abuse Brother     Hypertension Maternal Grandmother     Diabetes Maternal Grandmother     Obesity Maternal Grandmother     Cancer Paternal Grandmother     Osteoporosis Paternal Grandmother     Breast cancer Maternal Aunt 40    Ovarian cancer Other        Social History     Socioeconomic History    Marital status:    Tobacco Use    Smoking status: Never    Smokeless tobacco: Never   Vaping Use    Vaping status: Never Used   Substance and Sexual Activity    Alcohol use: Yes     Alcohol/week: 1.0 standard drink of alcohol     Types: 1 Glasses of wine per week     Comment: occasional, glass of wine every couple weeks    Drug use: Never    Sexual activity:  Yes     Partners: Male     Birth control/protection: Tubal ligation           Objective   Physical Exam  Constitutional:       Appearance: She is well-developed. She is ill-appearing.   HENT:      Head: Normocephalic and atraumatic.   Eyes:      Extraocular Movements: Extraocular movements intact.   Cardiovascular:      Rate and Rhythm: Normal rate.   Pulmonary:      Effort: Pulmonary effort is normal.      Breath sounds: Normal breath sounds.   Abdominal:      General: Abdomen is flat. Bowel sounds are normal.      Palpations: Abdomen is soft.      Tenderness: There is abdominal tenderness in the right upper quadrant, right lower quadrant, periumbilical area and left lower quadrant. There is left CVA tenderness. There is no right CVA tenderness. Positive signs include McBurney's sign. Negative signs include Salazar's sign.      Hernia: No hernia is present.   Skin:     General: Skin is warm and dry.      Capillary Refill: Capillary refill takes less than 2 seconds.   Neurological:      General: No focal deficit present.      Mental Status: She is alert and oriented to person, place, and time.   Psychiatric:         Mood and Affect: Mood is anxious.         Procedures           ED Course  ED Course as of 05/09/24 1248   Thu May 09, 2024   1144 Initially evaluated ED flow room 24.  She is accompanied by her . [JH]   1159 UPT is negative [JH]   1209 CBC without any abnormality. [JH]   1210 Discussion has ensued with the CT technicians, primary RN, and provider who returned to obtain history from the patient regarding her history of IV contrast, to which the patient reports she has never had any issues with that, including a documented scan in November 2022.  Patient is agreeable to have IV contrast. [JH]   1224 CT of the abdomen without contrast, as the patient had recalled that she reportedly had a shellfish allergy as an 7-year-old. [JH]   1243 Serum chemistry and urinalysis without abnormality. [JH]   1244 CT  result without acute abnormality, although limited without the use of contrast. [JH]   1244 Patient is given p.o. fluids and will have challenge and reevaluation for disposition. []   1246 Reports she is requesting to leave now, given the workup results, disposition to her primary care will be appropriate, with ongoing fluid rehydration. [JH]      ED Course User Index  [] Berto Goss, DIMITRI                                             Medical Decision Making  Given the patient's complaints, differential diagnosis cannot exclude acute appendicitis, urinary tract infection, renal calculi, pyelonephritis, colitis, diverticulosis, pancreatitis, enteritis, gastrointestinal viral syndrome, dehydration.  Patient will have serum screen labs, urinalysis, CT imaging abdomen pelvis, IV crystalloid, antiemetic, analgesic medication administered.  Results to be communicated, patient be reexamined for improvement in her symptoms, as well as disposition.  Patient is agreeable with this plan.    Amount and/or Complexity of Data Reviewed  Labs: ordered.  Radiology: ordered.    Risk  Prescription drug management.        Final diagnoses:   Nausea and vomiting, unspecified vomiting type       ED Disposition  ED Disposition       ED Disposition   Discharge    Condition   Stable    Comment   --               Sujatha Wang MD  [address] [place] [address]  [phone]      As needed         Medication List      No changes were made to your prescriptions during this visit.            Berto Goss, DIMITRI  05/09/24 2690

## 2024-09-12 ENCOUNTER — APPOINTMENT (OUTPATIENT)
Dept: CT IMAGING | Facility: HOSPITAL | Age: 34
End: 2024-09-12
Payer: COMMERCIAL

## 2024-09-12 ENCOUNTER — HOSPITAL ENCOUNTER (EMERGENCY)
Facility: HOSPITAL | Age: 34
Discharge: HOME | End: 2024-09-12
Attending: STUDENT IN AN ORGANIZED HEALTH CARE EDUCATION/TRAINING PROGRAM
Payer: COMMERCIAL

## 2024-09-12 VITALS
HEART RATE: 106 BPM | WEIGHT: 118 LBS | DIASTOLIC BLOOD PRESSURE: 66 MMHG | OXYGEN SATURATION: 99 % | SYSTOLIC BLOOD PRESSURE: 99 MMHG | RESPIRATION RATE: 16 BRPM | HEIGHT: 63 IN | BODY MASS INDEX: 20.91 KG/M2 | TEMPERATURE: 97.8 F

## 2024-09-12 DIAGNOSIS — N20.0 KIDNEY STONE: Primary | ICD-10-CM

## 2024-09-12 LAB
ALBUMIN SERPL-MCNC: 4 G/DL (ref 3.5–5.2)
ALBUMIN/GLOB SERPL: 1.5 G/DL
ALP SERPL-CCNC: 49 U/L (ref 39–117)
ALT SERPL W P-5'-P-CCNC: 14 U/L (ref 1–33)
ANION GAP SERPL CALCULATED.3IONS-SCNC: 12.7 MMOL/L (ref 5–15)
AST SERPL-CCNC: 19 U/L (ref 1–32)
B-HCG UR QL: NEGATIVE
BACTERIA UR QL AUTO: ABNORMAL /HPF
BASOPHILS # BLD AUTO: 0.04 10*3/MM3 (ref 0–0.2)
BASOPHILS NFR BLD AUTO: 0.4 % (ref 0–1.5)
BILIRUB SERPL-MCNC: 0.5 MG/DL (ref 0–1.2)
BILIRUB UR QL STRIP: NEGATIVE
BUN SERPL-MCNC: 11 MG/DL (ref 6–20)
BUN/CREAT SERPL: 13.3 (ref 7–25)
CALCIUM SPEC-SCNC: 9.2 MG/DL (ref 8.6–10.5)
CHLORIDE SERPL-SCNC: 101 MMOL/L (ref 98–107)
CLARITY UR: CLEAR
CO2 SERPL-SCNC: 23.3 MMOL/L (ref 22–29)
COLOR UR: ABNORMAL
CREAT SERPL-MCNC: 0.83 MG/DL (ref 0.57–1)
DEPRECATED RDW RBC AUTO: 42.4 FL (ref 37–54)
EGFRCR SERPLBLD CKD-EPI 2021: 94.4 ML/MIN/1.73
EOSINOPHIL # BLD AUTO: 0.06 10*3/MM3 (ref 0–0.4)
EOSINOPHIL NFR BLD AUTO: 0.6 % (ref 0.3–6.2)
ERYTHROCYTE [DISTWIDTH] IN BLOOD BY AUTOMATED COUNT: 13.3 % (ref 12.3–15.4)
FLUAV RNA RESP QL NAA+PROBE: NOT DETECTED
FLUBV RNA RESP QL NAA+PROBE: NOT DETECTED
GLOBULIN UR ELPH-MCNC: 2.7 GM/DL
GLUCOSE SERPL-MCNC: 97 MG/DL (ref 65–99)
GLUCOSE UR STRIP-MCNC: NEGATIVE MG/DL
HCT VFR BLD AUTO: 38.1 % (ref 34–46.6)
HGB BLD-MCNC: 13.3 G/DL (ref 12–15.9)
HGB UR QL STRIP.AUTO: NEGATIVE
HYALINE CASTS UR QL AUTO: ABNORMAL /LPF
IMM GRANULOCYTES # BLD AUTO: 0.05 10*3/MM3 (ref 0–0.05)
IMM GRANULOCYTES NFR BLD AUTO: 0.5 % (ref 0–0.5)
KETONES UR QL STRIP: ABNORMAL
LEUKOCYTE ESTERASE UR QL STRIP.AUTO: NEGATIVE
LIPASE SERPL-CCNC: 18 U/L (ref 13–60)
LYMPHOCYTES # BLD AUTO: 2.12 10*3/MM3 (ref 0.7–3.1)
LYMPHOCYTES NFR BLD AUTO: 21.8 % (ref 19.6–45.3)
MCH RBC QN AUTO: 30.5 PG (ref 26.6–33)
MCHC RBC AUTO-ENTMCNC: 34.9 G/DL (ref 31.5–35.7)
MCV RBC AUTO: 87.4 FL (ref 79–97)
MONOCYTES # BLD AUTO: 0.57 10*3/MM3 (ref 0.1–0.9)
MONOCYTES NFR BLD AUTO: 5.9 % (ref 5–12)
NEUTROPHILS NFR BLD AUTO: 6.89 10*3/MM3 (ref 1.7–7)
NEUTROPHILS NFR BLD AUTO: 70.8 % (ref 42.7–76)
NITRITE UR QL STRIP: NEGATIVE
NRBC BLD AUTO-RTO: 0 /100 WBC (ref 0–0.2)
PH UR STRIP.AUTO: 6 [PH] (ref 5–8)
PLATELET # BLD AUTO: 264 10*3/MM3 (ref 140–450)
PMV BLD AUTO: 8.9 FL (ref 6–12)
POTASSIUM SERPL-SCNC: 3.4 MMOL/L (ref 3.5–5.2)
PROT SERPL-MCNC: 6.7 G/DL (ref 6–8.5)
PROT UR QL STRIP: ABNORMAL
RBC # BLD AUTO: 4.36 10*6/MM3 (ref 3.77–5.28)
RBC # UR STRIP: ABNORMAL /HPF
REF LAB TEST METHOD: ABNORMAL
SARS-COV-2 RNA RESP QL NAA+PROBE: NOT DETECTED
SODIUM SERPL-SCNC: 137 MMOL/L (ref 136–145)
SP GR UR STRIP: >=1.03 (ref 1–1.03)
SQUAMOUS #/AREA URNS HPF: ABNORMAL /HPF
UROBILINOGEN UR QL STRIP: ABNORMAL
WBC # UR STRIP: ABNORMAL /HPF
WBC NRBC COR # BLD AUTO: 9.73 10*3/MM3 (ref 3.4–10.8)

## 2024-09-12 PROCEDURE — 96375 TX/PRO/DX INJ NEW DRUG ADDON: CPT

## 2024-09-12 PROCEDURE — 36415 COLL VENOUS BLD VENIPUNCTURE: CPT

## 2024-09-12 PROCEDURE — 87636 SARSCOV2 & INF A&B AMP PRB: CPT | Performed by: STUDENT IN AN ORGANIZED HEALTH CARE EDUCATION/TRAINING PROGRAM

## 2024-09-12 PROCEDURE — 81001 URINALYSIS AUTO W/SCOPE: CPT | Performed by: STUDENT IN AN ORGANIZED HEALTH CARE EDUCATION/TRAINING PROGRAM

## 2024-09-12 PROCEDURE — 81025 URINE PREGNANCY TEST: CPT | Performed by: STUDENT IN AN ORGANIZED HEALTH CARE EDUCATION/TRAINING PROGRAM

## 2024-09-12 PROCEDURE — 25510000001 IOPAMIDOL 61 % SOLUTION: Performed by: STUDENT IN AN ORGANIZED HEALTH CARE EDUCATION/TRAINING PROGRAM

## 2024-09-12 PROCEDURE — 80053 COMPREHEN METABOLIC PANEL: CPT | Performed by: STUDENT IN AN ORGANIZED HEALTH CARE EDUCATION/TRAINING PROGRAM

## 2024-09-12 PROCEDURE — 83690 ASSAY OF LIPASE: CPT | Performed by: STUDENT IN AN ORGANIZED HEALTH CARE EDUCATION/TRAINING PROGRAM

## 2024-09-12 PROCEDURE — 25810000003 SODIUM CHLORIDE 0.9 % SOLUTION: Performed by: STUDENT IN AN ORGANIZED HEALTH CARE EDUCATION/TRAINING PROGRAM

## 2024-09-12 PROCEDURE — 25010000002 HYDROMORPHONE 1 MG/ML SOLUTION: Performed by: STUDENT IN AN ORGANIZED HEALTH CARE EDUCATION/TRAINING PROGRAM

## 2024-09-12 PROCEDURE — 99285 EMERGENCY DEPT VISIT HI MDM: CPT

## 2024-09-12 PROCEDURE — 96374 THER/PROPH/DIAG INJ IV PUSH: CPT

## 2024-09-12 PROCEDURE — 25010000002 ONDANSETRON PER 1 MG: Performed by: STUDENT IN AN ORGANIZED HEALTH CARE EDUCATION/TRAINING PROGRAM

## 2024-09-12 PROCEDURE — 74177 CT ABD & PELVIS W/CONTRAST: CPT

## 2024-09-12 PROCEDURE — 85025 COMPLETE CBC W/AUTO DIFF WBC: CPT | Performed by: STUDENT IN AN ORGANIZED HEALTH CARE EDUCATION/TRAINING PROGRAM

## 2024-09-12 PROCEDURE — 25010000002 METOCLOPRAMIDE PER 10 MG: Performed by: STUDENT IN AN ORGANIZED HEALTH CARE EDUCATION/TRAINING PROGRAM

## 2024-09-12 PROCEDURE — 25010000002 KETOROLAC TROMETHAMINE PER 15 MG: Performed by: STUDENT IN AN ORGANIZED HEALTH CARE EDUCATION/TRAINING PROGRAM

## 2024-09-12 RX ORDER — HYDROMORPHONE HYDROCHLORIDE 2 MG/1
2 TABLET ORAL EVERY 4 HOURS PRN
Qty: 12 TABLET | Refills: 0 | Status: SHIPPED | OUTPATIENT
Start: 2024-09-12

## 2024-09-12 RX ORDER — METOCLOPRAMIDE HYDROCHLORIDE 5 MG/ML
5 INJECTION INTRAMUSCULAR; INTRAVENOUS ONCE
Status: COMPLETED | OUTPATIENT
Start: 2024-09-12 | End: 2024-09-12

## 2024-09-12 RX ORDER — ONDANSETRON 2 MG/ML
4 INJECTION INTRAMUSCULAR; INTRAVENOUS ONCE
Status: COMPLETED | OUTPATIENT
Start: 2024-09-12 | End: 2024-09-12

## 2024-09-12 RX ORDER — TAMSULOSIN HYDROCHLORIDE 0.4 MG/1
0.4 CAPSULE ORAL ONCE
Status: COMPLETED | OUTPATIENT
Start: 2024-09-12 | End: 2024-09-12

## 2024-09-12 RX ORDER — KETOROLAC TROMETHAMINE 30 MG/ML
15 INJECTION, SOLUTION INTRAMUSCULAR; INTRAVENOUS ONCE
Status: COMPLETED | OUTPATIENT
Start: 2024-09-12 | End: 2024-09-12

## 2024-09-12 RX ORDER — TAMSULOSIN HYDROCHLORIDE 0.4 MG/1
1 CAPSULE ORAL NIGHTLY
Qty: 14 CAPSULE | Refills: 0 | Status: SHIPPED | OUTPATIENT
Start: 2024-09-12

## 2024-09-12 RX ORDER — ONDANSETRON 4 MG/1
4 TABLET, ORALLY DISINTEGRATING ORAL 4 TIMES DAILY PRN
Qty: 12 TABLET | Refills: 0 | Status: SHIPPED | OUTPATIENT
Start: 2024-09-12

## 2024-09-12 RX ORDER — IOPAMIDOL 612 MG/ML
100 INJECTION, SOLUTION INTRAVASCULAR
Status: COMPLETED | OUTPATIENT
Start: 2024-09-12 | End: 2024-09-12

## 2024-09-12 RX ORDER — IBUPROFEN 600 MG/1
600 TABLET, FILM COATED ORAL EVERY 6 HOURS PRN
Qty: 20 TABLET | Refills: 0 | Status: SHIPPED | OUTPATIENT
Start: 2024-09-12

## 2024-09-12 RX ADMIN — SODIUM CHLORIDE 1000 ML: 9 INJECTION, SOLUTION INTRAVENOUS at 13:10

## 2024-09-12 RX ADMIN — METOCLOPRAMIDE 5 MG: 5 INJECTION, SOLUTION INTRAMUSCULAR; INTRAVENOUS at 13:31

## 2024-09-12 RX ADMIN — HYDROMORPHONE HYDROCHLORIDE 1 MG: 1 INJECTION, SOLUTION INTRAMUSCULAR; INTRAVENOUS; SUBCUTANEOUS at 13:32

## 2024-09-12 RX ADMIN — SODIUM CHLORIDE 1000 ML: 9 INJECTION, SOLUTION INTRAVENOUS at 11:22

## 2024-09-12 RX ADMIN — ONDANSETRON 4 MG: 2 INJECTION INTRAMUSCULAR; INTRAVENOUS at 11:22

## 2024-09-12 RX ADMIN — IOPAMIDOL 100 ML: 612 INJECTION, SOLUTION INTRAVENOUS at 12:28

## 2024-09-12 RX ADMIN — KETOROLAC TROMETHAMINE 15 MG: 30 INJECTION, SOLUTION INTRAMUSCULAR; INTRAVENOUS at 11:22

## 2024-09-12 RX ADMIN — TAMSULOSIN HYDROCHLORIDE 0.4 MG: 0.4 CAPSULE ORAL at 13:06

## 2024-09-12 NOTE — ED PROVIDER NOTES
"         [facility] EMERGENCY DEPARTMENT  Emergency Department Encounter  Emergency Medicine Physician Note       Pt Name: Gunjan Engel  MRN: [id number]  Pt :   1990  Room Number:    Date of encounter:  2024  PCP: Sujatha Wang MD  ED Provider: Garrick Villareal MD    Historian: Patient      HPI:  Chief Complaint: Abdominal pain        Context: Gunjan Engel is a 34 y.o. female who presents to the ED for left lower quadrant abdominal pain.  Onset this morning.  Patient states she had nausea vomiting diarrhea previously for the past 2 days and believes she was ill from catching a virus from her kids.  This morning symptoms worsened with associated nausea/vomiting.  She reports recent onset of menses as well as history of PCOS and endometriosis.  No blood in stool.  No known fevers but has felt sweaty.      PAST MEDICAL HISTORY  Past Medical History:   Diagnosis Date    Anxiety     Arrhythmia     reported hx of \"arrhythmia\" by PCP. No cardiac eval.    Asthma     uses albuterol fairly often.    Bulimia nervosa     Chronic back pain     PRN tylenol    Depression     Diabetes mellitus     Dyspepsia     with greasy foods    Endometriosis determined by laparoscopy     History of 8-9 laparoscopies Dr. Evgeny Houser     tums PRN. No egd or h pylori    History of COVID-19 2021    History of uterine fibroid     ,     Hypertension     H/O    Migraine     Ovarian cyst     PCOS (polycystic ovarian syndrome)     Post partum depression 2016    Seasonal allergies          PAST SURGICAL HISTORY  Past Surgical History:   Procedure Laterality Date     SECTION Bilateral 2017    Procedure:  SECTION REPEAT;  Surgeon: Cheikh Bonilla MD;  Location: [facility];  Service:      SECTION N/A 10/01/2021    Procedure:  SECTION REPEAT;  Surgeon: Luzmaria Forbes MD;  Location:  [facility];  Service: " Obstetrics/Gynecology;  Laterality: N/A;    DIAGNOSTIC LAPAROSCOPY      TIMES 8-9 for endometriosis; 7937-6672    MYOMECTOMY  2013, 2015         FAMILY HISTORY  Family History   Problem Relation Age of Onset    Cancer Mother     Heart disease Mother     Hypertension Mother     Diabetes Mother     Ovarian cancer Mother 36    Obesity Mother     Hypertension Father     Colon cancer Father 40    Sleep apnea Father     Obesity Sister     Obesity Sister     Drug abuse Brother     Hypertension Maternal Grandmother     Diabetes Maternal Grandmother     Obesity Maternal Grandmother     Cancer Paternal Grandmother     Osteoporosis Paternal Grandmother     Breast cancer Maternal Aunt 40    Ovarian cancer Other          SOCIAL HISTORY  Social History     Socioeconomic History    Marital status: Legally    Tobacco Use    Smoking status: Never    Smokeless tobacco: Never   Vaping Use    Vaping status: Never Used   Substance and Sexual Activity    Alcohol use: Yes     Alcohol/week: 1.0 standard drink of alcohol     Types: 1 Glasses of wine per week     Comment: occasional, glass of wine every couple weeks    Drug use: Never    Sexual activity: Yes     Partners: Male     Birth control/protection: Tubal ligation         ALLERGIES  Hydrocodone, Latex, Methylene blue, Mushroom extract complex, Percocet [oxycodone-acetaminophen], Shellfish-derived products, Adhesive tape, and Codeine        REVIEW OF SYSTEMS  Systems reviewed and negative      PHYSICAL EXAM    I have reviewed the triage vital signs and nursing notes.    ED Triage Vitals [09/12/24 1054]   Temp Heart Rate Resp BP SpO2   97.8 °F (36.6 °C) 81 17 137/92 100 %      Temp src Heart Rate Source Patient Position BP Location FiO2 (%)   Oral Monitor Sitting -- --       Physical Exam  Constitutional:       General: She is not in acute distress.  Cardiovascular:      Rate and Rhythm: Normal rate.      Pulses: Normal pulses.   Pulmonary:      Effort: Pulmonary effort is  normal. No respiratory distress.   Abdominal:      General: There is no distension.      Palpations: Abdomen is soft.      Tenderness: There is abdominal tenderness in the left lower quadrant.   Musculoskeletal:         General: No deformity.      Cervical back: Neck supple.   Skin:     General: Skin is warm.   Neurological:      General: No focal deficit present.      Mental Status: She is alert.         LAB RESULTS  Recent Results (from the past 24 hour(s))   Comprehensive Metabolic Panel    Collection Time: 09/12/24 11:12 AM    Specimen: Blood   Result Value Ref Range    Glucose 97 65 - 99 mg/dL    BUN 11 6 - 20 mg/dL    Creatinine 0.83 0.57 - 1.00 mg/dL    Sodium 137 136 - 145 mmol/L    Potassium 3.4 (L) 3.5 - 5.2 mmol/L    Chloride 101 98 - 107 mmol/L    CO2 23.3 22.0 - 29.0 mmol/L    Calcium 9.2 8.6 - 10.5 mg/dL    Total Protein 6.7 6.0 - 8.5 g/dL    Albumin 4.0 3.5 - 5.2 g/dL    ALT (SGPT) 14 1 - 33 U/L    AST (SGOT) 19 1 - 32 U/L    Alkaline Phosphatase 49 39 - 117 U/L    Total Bilirubin 0.5 0.0 - 1.2 mg/dL    Globulin 2.7 gm/dL    A/G Ratio 1.5 g/dL    BUN/Creatinine Ratio 13.3 7.0 - 25.0    Anion Gap 12.7 5.0 - 15.0 mmol/L    eGFR 94.4 >60.0 mL/min/1.73   Lipase    Collection Time: 09/12/24 11:12 AM    Specimen: Blood   Result Value Ref Range    Lipase 18 13 - 60 U/L   CBC Auto Differential    Collection Time: 09/12/24 11:12 AM    Specimen: Blood   Result Value Ref Range    WBC 9.73 3.40 - 10.80 10*3/mm3    RBC 4.36 3.77 - 5.28 10*6/mm3    Hemoglobin 13.3 12.0 - 15.9 g/dL    Hematocrit 38.1 34.0 - 46.6 %    MCV 87.4 79.0 - 97.0 fL    MCH 30.5 26.6 - 33.0 pg    MCHC 34.9 31.5 - 35.7 g/dL    RDW 13.3 12.3 - 15.4 %    RDW-SD 42.4 37.0 - 54.0 fl    MPV 8.9 6.0 - 12.0 fL    Platelets 264 140 - 450 10*3/mm3    Neutrophil % 70.8 42.7 - 76.0 %    Lymphocyte % 21.8 19.6 - 45.3 %    Monocyte % 5.9 5.0 - 12.0 %    Eosinophil % 0.6 0.3 - 6.2 %    Basophil % 0.4 0.0 - 1.5 %    Immature Grans % 0.5 0.0 - 0.5 %     Neutrophils, Absolute 6.89 1.70 - 7.00 10*3/mm3    Lymphocytes, Absolute 2.12 0.70 - 3.10 10*3/mm3    Monocytes, Absolute 0.57 0.10 - 0.90 10*3/mm3    Eosinophils, Absolute 0.06 0.00 - 0.40 10*3/mm3    Basophils, Absolute 0.04 0.00 - 0.20 10*3/mm3    Immature Grans, Absolute 0.05 0.00 - 0.05 10*3/mm3    nRBC 0.0 0.0 - 0.2 /100 WBC   Urinalysis With Microscopic If Indicated (No Culture) - Urine, Clean Catch    Collection Time: 09/12/24 11:51 AM    Specimen: Urine, Clean Catch   Result Value Ref Range    Color, UA Dark Yellow (A) Yellow, Straw    Appearance, UA Clear Clear    pH, UA 6.0 5.0 - 8.0    Specific Gravity, UA >=1.030 1.005 - 1.030    Glucose, UA Negative Negative    Ketones, UA 40 mg/dL (2+) (A) Negative    Bilirubin, UA Negative Negative    Blood, UA Negative Negative    Protein, UA 30 mg/dL (1+) (A) Negative    Leuk Esterase, UA Negative Negative    Nitrite, UA Negative Negative    Urobilinogen, UA 1.0 E.U./dL 0.2 - 1.0 E.U./dL   Pregnancy, Urine - Urine, Clean Catch    Collection Time: 09/12/24 11:51 AM    Specimen: Urine, Clean Catch   Result Value Ref Range    HCG, Urine QL Negative Negative   Urinalysis, Microscopic Only - Urine, Clean Catch    Collection Time: 09/12/24 11:51 AM    Specimen: Urine, Clean Catch   Result Value Ref Range    RBC, UA 0-2 None Seen, 0-2 /HPF    WBC, UA 0-2 None Seen, 0-2 /HPF    Bacteria, UA Trace (A) None Seen /HPF    Squamous Epithelial Cells, UA 0-2 None Seen, 0-2 /HPF    Hyaline Casts, UA None Seen None Seen /LPF    Methodology Manual Light Microscopy    COVID-19 and FLU A/B PCR, 1 HR TAT - Swab, Nasopharynx    Collection Time: 09/12/24 12:59 PM    Specimen: Nasopharynx; Swab   Result Value Ref Range    COVID19 Not Detected Not Detected - Ref. Range    Influenza A PCR Not Detected Not Detected    Influenza B PCR Not Detected Not Detected       If labs were ordered, I independently reviewed the results and considered them in treating the patient.        RADIOLOGY  CT  Abdomen Pelvis With Contrast    Result Date: 9/12/2024  PROCEDURE: CT ABDOMEN PELVIS W CONTRAST-  HISTORY: LLQ abd pain, N/V hx PCOS/endometriosis  COMPARISON: May 9, 2020.  PROCEDURE: The patient was injected with IV contrast. Oral contrast was administered. Axial images were obtained from the lung bases to the pubic symphysis by computed tomography. This study was performed with techniques to keep radiation doses as low as reasonably achievable, (ALARA). Individualized dose reduction techniques using automated exposure control or adjustment of mA and/or kV according to the patient size were employed.  FINDINGS:  ABDOMEN: The lung bases are clear. The heart is proper size. The liver is homogenous with no focal abnormality. Gallbladder present with no CT visible stones. The spleen is unremarkable. No adrenal mass is present. The pancreas is unremarkable. The kidneys demonstrate normal appearance of the right kidney. On the prior exam there is a nonobstructing stone inferior pole left kidney. This is no longer identified in the kidney. It is located at the left UVJ and causes moderate hydronephrosis. The aorta is proper caliber. There is no free fluid or adenopathy.  PELVIS: The GI tract demonstrates no obstruction.  The appendix is not identified. The urinary bladder is mostly collapsed. There is very mild, nonspecific free fluid in the pelvis. Uterus is midline.      Moderate left hydronephrosis secondary to a 4 mm UVJ stone.    CTDI: 4.3 mGy DLP:192.16 mGy.cm  This report was signed and finalized on 9/12/2024 12:36 PM by Aurea Lares MD.       PROCEDURES    Procedures    No orders to display       MEDICATIONS GIVEN IN ER    Medications   ketorolac (TORADOL) injection 15 mg (15 mg Intravenous Given 9/12/24 1122)   ondansetron (ZOFRAN) injection 4 mg (4 mg Intravenous Given 9/12/24 1122)   sodium chloride 0.9 % bolus 1,000 mL (0 mL Intravenous Stopped 9/12/24 1208)   iopamidol (ISOVUE-300) 61 % injection 100 mL (100  mL Intravenous Given 9/12/24 1228)   tamsulosin (FLOMAX) 24 hr capsule 0.4 mg (0.4 mg Oral Given 9/12/24 1306)   sodium chloride 0.9 % bolus 1,000 mL (0 mL Intravenous Stopped 9/12/24 1457)   HYDROmorphone (DILAUDID) injection 1 mg (1 mg Intravenous Given 9/12/24 1332)   metoclopramide (REGLAN) injection 5 mg (5 mg Intravenous Given 9/12/24 1331)         MEDICAL DECISION MAKING, PROGRESS, and CONSULTS    All labs, if obtained, have been independently reviewed by me.  All radiology studies, if obtained, have been reviewed by me and the radiologist dictating the report.  All EKG's, if obtained, have been independently viewed and interpreted by me.      Discussion below represents my analysis of pertinent findings related to patient's condition, differential diagnosis, treatment plan and final disposition.                         Differential diagnosis:    Diarrheal illness, UTI, kidney stone, MAMIE, diverticulitis, ovarian cyst, others.      Additional sources:    - Discussed/ obtained information from independent historians:      - External (non-ED) record review: Urgent care provider note 6/4/2024    - Chronic or social conditions impacting care:      - Shared decision making:        Orders placed during this visit:  Orders Placed This Encounter   Procedures    COVID-19 and FLU A/B PCR, 1 HR TAT - Swab, Nasopharynx    CT Abdomen Pelvis With Contrast    Urinalysis With Microscopic If Indicated (No Culture) - Urine, Clean Catch    Pregnancy, Urine - Urine, Clean Catch    Comprehensive Metabolic Panel    Lipase    CBC Auto Differential    Urinalysis, Microscopic Only - Urine, Clean Catch    Ambulatory Referral to Urology    CBC & Differential         Additional orders considered but not ordered:      ED Course/MDM Discussion:    Patient is a 34-year-old female who presented for evaluation of left lower quadrant abdominal pain.  She had recently had flulike symptoms over the past few days.  She arrived hemodynamically  stable in no acute distress.  She has no leukocytosis no critical anemia.  No MAMIE or major electrolyte derangement.  CT imaging obtained shows evidence of left-sided hydronephrosis on my interpretation of imaging.  There is note of 4 mm obstructing stone on radiology interpretation.  There is no evidence of superimposed infection on urinalysis.  Ketones are noted.  She was given aggressive intravenous hydration in addition to multimodal analgesia and antiemetic to good effect.  Given size, amenable to trial of medical expulsion therapy.  Will refer to urology here in [place].  Instructed on strict return precautions.  Patient agreeable to plan.                    Consultants:      Shared Decision Making:  After my consideration of clinical presentation and any laboratory/radiology studies obtained, I discussed the findings with the patient/patient representative who is in agreement with the treatment plan and the final disposition.   Risks and benefits of discharge and/or observation/admission were discussed.         AS OF 08:26 EDT VITALS:    BP - 99/66  HR - 106  TEMP - 97.8 °F (36.6 °C)  O2 SATS - 99%                  DIAGNOSIS  Final diagnoses:   Kidney stone         DISPOSITION  ED Disposition       ED Disposition   Discharge    Condition   Stable    Comment   --                   Please note that portions of this document were completed with voice recognition software.        Garrick Villareal MD  09/13/24 8701

## 2024-09-12 NOTE — DISCHARGE INSTRUCTIONS
Alternate ibuprofen and Tylenol for symptom control.  Take Zofran as needed for nausea.  For breakthrough pain you may take oral Dilaudid.  No driving or operating heavy machinery while take this medication.  Do not combine with other sedating medications or alcohol.  Take Flomax at night to help with stone passage.  Follow-up with urology as instructed.

## 2024-09-12 NOTE — ED NOTES
Spoke with Phani in the lab and asked labels for bloodwork to be printed off and ran on tubes sent up to lab. Was informed information would be given to appropriate areas. Will follow up.

## 2024-09-12 NOTE — ED NOTES
Provider sent message in regards to lab results not being back. Contacted lab and spoke with Phani and was told he would take care of it because info was given when first call placed at 11:15. Provider notified

## 2025-06-14 ENCOUNTER — HOSPITAL ENCOUNTER (EMERGENCY)
Facility: HOSPITAL | Age: 35
Discharge: HOME | End: 2025-06-14
Attending: STUDENT IN AN ORGANIZED HEALTH CARE EDUCATION/TRAINING PROGRAM
Payer: COMMERCIAL

## 2025-06-14 ENCOUNTER — APPOINTMENT (OUTPATIENT)
Dept: ULTRASOUND IMAGING | Facility: HOSPITAL | Age: 35
End: 2025-06-14
Payer: COMMERCIAL

## 2025-06-14 ENCOUNTER — APPOINTMENT (OUTPATIENT)
Dept: CT IMAGING | Facility: HOSPITAL | Age: 35
End: 2025-06-14
Payer: COMMERCIAL

## 2025-06-14 VITALS
BODY MASS INDEX: 20.38 KG/M2 | HEART RATE: 117 BPM | HEIGHT: 63 IN | SYSTOLIC BLOOD PRESSURE: 115 MMHG | RESPIRATION RATE: 16 BRPM | WEIGHT: 115 LBS | TEMPERATURE: 97.6 F | DIASTOLIC BLOOD PRESSURE: 83 MMHG | OXYGEN SATURATION: 100 %

## 2025-06-14 DIAGNOSIS — R11.2 NAUSEA VOMITING AND DIARRHEA: ICD-10-CM

## 2025-06-14 DIAGNOSIS — N93.9 VAGINAL BLEEDING: ICD-10-CM

## 2025-06-14 DIAGNOSIS — R19.7 NAUSEA VOMITING AND DIARRHEA: ICD-10-CM

## 2025-06-14 DIAGNOSIS — R10.30 LOWER ABDOMINAL PAIN: Primary | ICD-10-CM

## 2025-06-14 DIAGNOSIS — N83.201 RIGHT OVARIAN CYST: ICD-10-CM

## 2025-06-14 LAB
ALBUMIN SERPL-MCNC: 4.1 G/DL (ref 3.5–5.2)
ALBUMIN/GLOB SERPL: 1.4 G/DL
ALP SERPL-CCNC: 61 U/L (ref 39–117)
ALT SERPL W P-5'-P-CCNC: 13 U/L (ref 1–33)
ANION GAP SERPL CALCULATED.3IONS-SCNC: 13.8 MMOL/L (ref 5–15)
AST SERPL-CCNC: 21 U/L (ref 1–32)
BACTERIA UR QL AUTO: ABNORMAL /HPF
BASOPHILS # BLD AUTO: 0.05 10*3/MM3 (ref 0–0.2)
BASOPHILS NFR BLD AUTO: 0.3 % (ref 0–1.5)
BILIRUB SERPL-MCNC: 0.5 MG/DL (ref 0–1.2)
BILIRUB UR QL STRIP: NEGATIVE
BUN SERPL-MCNC: 8 MG/DL (ref 6–20)
BUN/CREAT SERPL: 11.6 (ref 7–25)
CALCIUM SPEC-SCNC: 9.6 MG/DL (ref 8.6–10.5)
CHLORIDE SERPL-SCNC: 102 MMOL/L (ref 98–107)
CLARITY UR: CLEAR
CO2 SERPL-SCNC: 22.2 MMOL/L (ref 22–29)
COLOR UR: YELLOW
CREAT SERPL-MCNC: 0.69 MG/DL (ref 0.57–1)
DEPRECATED RDW RBC AUTO: 41.5 FL (ref 37–54)
EGFRCR SERPLBLD CKD-EPI 2021: 116.2 ML/MIN/1.73
EOSINOPHIL # BLD AUTO: 0.01 10*3/MM3 (ref 0–0.4)
EOSINOPHIL NFR BLD AUTO: 0.1 % (ref 0.3–6.2)
ERYTHROCYTE [DISTWIDTH] IN BLOOD BY AUTOMATED COUNT: 12 % (ref 12.3–15.4)
GLOBULIN UR ELPH-MCNC: 3 GM/DL
GLUCOSE BLDC GLUCOMTR-MCNC: 121 MG/DL (ref 70–130)
GLUCOSE SERPL-MCNC: 177 MG/DL (ref 65–99)
GLUCOSE UR STRIP-MCNC: NEGATIVE MG/DL
HCG SERPL QL: NEGATIVE
HCT VFR BLD AUTO: 40.6 % (ref 34–46.6)
HGB BLD-MCNC: 13.9 G/DL (ref 12–15.9)
HGB UR QL STRIP.AUTO: ABNORMAL
HOLD SPECIMEN: NORMAL
HOLD SPECIMEN: NORMAL
HYALINE CASTS UR QL AUTO: ABNORMAL /LPF
IMM GRANULOCYTES # BLD AUTO: 0.06 10*3/MM3 (ref 0–0.05)
IMM GRANULOCYTES NFR BLD AUTO: 0.4 % (ref 0–0.5)
KETONES UR QL STRIP: NEGATIVE
LEUKOCYTE ESTERASE UR QL STRIP.AUTO: NEGATIVE
LIPASE SERPL-CCNC: 18 U/L (ref 13–60)
LYMPHOCYTES # BLD AUTO: 1.98 10*3/MM3 (ref 0.7–3.1)
LYMPHOCYTES NFR BLD AUTO: 12.4 % (ref 19.6–45.3)
MCH RBC QN AUTO: 32 PG (ref 26.6–33)
MCHC RBC AUTO-ENTMCNC: 34.2 G/DL (ref 31.5–35.7)
MCV RBC AUTO: 93.3 FL (ref 79–97)
MONOCYTES # BLD AUTO: 0.4 10*3/MM3 (ref 0.1–0.9)
MONOCYTES NFR BLD AUTO: 2.5 % (ref 5–12)
NEUTROPHILS NFR BLD AUTO: 13.42 10*3/MM3 (ref 1.7–7)
NEUTROPHILS NFR BLD AUTO: 84.3 % (ref 42.7–76)
NITRITE UR QL STRIP: NEGATIVE
NRBC BLD AUTO-RTO: 0 /100 WBC (ref 0–0.2)
PH UR STRIP.AUTO: 8 [PH] (ref 5–8)
PLATELET # BLD AUTO: 243 10*3/MM3 (ref 140–450)
PMV BLD AUTO: 8.7 FL (ref 6–12)
POTASSIUM SERPL-SCNC: 3.8 MMOL/L (ref 3.5–5.2)
PROT SERPL-MCNC: 7.1 G/DL (ref 6–8.5)
PROT UR QL STRIP: NEGATIVE
RBC # BLD AUTO: 4.35 10*6/MM3 (ref 3.77–5.28)
RBC # UR STRIP: ABNORMAL /HPF
REF LAB TEST METHOD: ABNORMAL
SODIUM SERPL-SCNC: 138 MMOL/L (ref 136–145)
SP GR UR STRIP: >1.03 (ref 1–1.03)
SQUAMOUS #/AREA URNS HPF: ABNORMAL /HPF
UROBILINOGEN UR QL STRIP: ABNORMAL
WBC # UR STRIP: ABNORMAL /HPF
WBC NRBC COR # BLD AUTO: 15.92 10*3/MM3 (ref 3.4–10.8)
WHOLE BLOOD HOLD COAG: NORMAL
WHOLE BLOOD HOLD SPECIMEN: NORMAL

## 2025-06-14 PROCEDURE — 76830 TRANSVAGINAL US NON-OB: CPT

## 2025-06-14 PROCEDURE — 96374 THER/PROPH/DIAG INJ IV PUSH: CPT

## 2025-06-14 PROCEDURE — 25010000002 ONDANSETRON PER 1 MG: Performed by: STUDENT IN AN ORGANIZED HEALTH CARE EDUCATION/TRAINING PROGRAM

## 2025-06-14 PROCEDURE — 25810000003 SODIUM CHLORIDE 0.9 % SOLUTION: Performed by: STUDENT IN AN ORGANIZED HEALTH CARE EDUCATION/TRAINING PROGRAM

## 2025-06-14 PROCEDURE — 25010000002 KETOROLAC TROMETHAMINE PER 15 MG: Performed by: STUDENT IN AN ORGANIZED HEALTH CARE EDUCATION/TRAINING PROGRAM

## 2025-06-14 PROCEDURE — 99285 EMERGENCY DEPT VISIT HI MDM: CPT | Performed by: STUDENT IN AN ORGANIZED HEALTH CARE EDUCATION/TRAINING PROGRAM

## 2025-06-14 PROCEDURE — 74177 CT ABD & PELVIS W/CONTRAST: CPT

## 2025-06-14 PROCEDURE — 25010000002 LORAZEPAM PER 2 MG: Performed by: STUDENT IN AN ORGANIZED HEALTH CARE EDUCATION/TRAINING PROGRAM

## 2025-06-14 PROCEDURE — 80053 COMPREHEN METABOLIC PANEL: CPT | Performed by: STUDENT IN AN ORGANIZED HEALTH CARE EDUCATION/TRAINING PROGRAM

## 2025-06-14 PROCEDURE — 93005 ELECTROCARDIOGRAM TRACING: CPT | Performed by: STUDENT IN AN ORGANIZED HEALTH CARE EDUCATION/TRAINING PROGRAM

## 2025-06-14 PROCEDURE — 25010000002 FENTANYL CITRATE (PF) 50 MCG/ML SOLUTION: Performed by: STUDENT IN AN ORGANIZED HEALTH CARE EDUCATION/TRAINING PROGRAM

## 2025-06-14 PROCEDURE — 96361 HYDRATE IV INFUSION ADD-ON: CPT

## 2025-06-14 PROCEDURE — 82948 REAGENT STRIP/BLOOD GLUCOSE: CPT

## 2025-06-14 PROCEDURE — 96375 TX/PRO/DX INJ NEW DRUG ADDON: CPT

## 2025-06-14 PROCEDURE — 25010000002 FAMOTIDINE 10 MG/ML SOLUTION: Performed by: STUDENT IN AN ORGANIZED HEALTH CARE EDUCATION/TRAINING PROGRAM

## 2025-06-14 PROCEDURE — 84703 CHORIONIC GONADOTROPIN ASSAY: CPT | Performed by: STUDENT IN AN ORGANIZED HEALTH CARE EDUCATION/TRAINING PROGRAM

## 2025-06-14 PROCEDURE — 83690 ASSAY OF LIPASE: CPT | Performed by: STUDENT IN AN ORGANIZED HEALTH CARE EDUCATION/TRAINING PROGRAM

## 2025-06-14 PROCEDURE — 25510000001 IOPAMIDOL 61 % SOLUTION: Performed by: STUDENT IN AN ORGANIZED HEALTH CARE EDUCATION/TRAINING PROGRAM

## 2025-06-14 PROCEDURE — 85025 COMPLETE CBC W/AUTO DIFF WBC: CPT | Performed by: STUDENT IN AN ORGANIZED HEALTH CARE EDUCATION/TRAINING PROGRAM

## 2025-06-14 PROCEDURE — 81001 URINALYSIS AUTO W/SCOPE: CPT | Performed by: STUDENT IN AN ORGANIZED HEALTH CARE EDUCATION/TRAINING PROGRAM

## 2025-06-14 RX ORDER — IOPAMIDOL 612 MG/ML
100 INJECTION, SOLUTION INTRAVASCULAR
Status: COMPLETED | OUTPATIENT
Start: 2025-06-14 | End: 2025-06-14

## 2025-06-14 RX ORDER — LORAZEPAM 2 MG/ML
0.5 INJECTION INTRAMUSCULAR ONCE
Status: COMPLETED | OUTPATIENT
Start: 2025-06-14 | End: 2025-06-14

## 2025-06-14 RX ORDER — IBUPROFEN 800 MG/1
800 TABLET, FILM COATED ORAL EVERY 8 HOURS PRN
Qty: 15 TABLET | Refills: 0 | Status: SHIPPED | OUTPATIENT
Start: 2025-06-14

## 2025-06-14 RX ORDER — ONDANSETRON 4 MG/1
4 TABLET, ORALLY DISINTEGRATING ORAL EVERY 8 HOURS PRN
Qty: 12 TABLET | Refills: 0 | Status: SHIPPED | OUTPATIENT
Start: 2025-06-14

## 2025-06-14 RX ORDER — FAMOTIDINE 10 MG/ML
20 INJECTION, SOLUTION INTRAVENOUS ONCE
Status: COMPLETED | OUTPATIENT
Start: 2025-06-14 | End: 2025-06-14

## 2025-06-14 RX ORDER — SODIUM CHLORIDE 0.9 % (FLUSH) 0.9 %
10 SYRINGE (ML) INJECTION AS NEEDED
Status: DISCONTINUED | OUTPATIENT
Start: 2025-06-14 | End: 2025-06-14 | Stop reason: HOSPADM

## 2025-06-14 RX ORDER — KETOROLAC TROMETHAMINE 30 MG/ML
30 INJECTION, SOLUTION INTRAMUSCULAR; INTRAVENOUS ONCE
Status: COMPLETED | OUTPATIENT
Start: 2025-06-14 | End: 2025-06-14

## 2025-06-14 RX ORDER — FENTANYL CITRATE 50 UG/ML
50 INJECTION, SOLUTION INTRAMUSCULAR; INTRAVENOUS ONCE
Refills: 0 | Status: COMPLETED | OUTPATIENT
Start: 2025-06-14 | End: 2025-06-14

## 2025-06-14 RX ORDER — ONDANSETRON 2 MG/ML
4 INJECTION INTRAMUSCULAR; INTRAVENOUS ONCE
Status: COMPLETED | OUTPATIENT
Start: 2025-06-14 | End: 2025-06-14

## 2025-06-14 RX ADMIN — ONDANSETRON 4 MG: 2 INJECTION INTRAMUSCULAR; INTRAVENOUS at 07:44

## 2025-06-14 RX ADMIN — FAMOTIDINE 20 MG: 10 INJECTION, SOLUTION INTRAVENOUS at 10:40

## 2025-06-14 RX ADMIN — KETOROLAC TROMETHAMINE 30 MG: 30 INJECTION, SOLUTION INTRAMUSCULAR; INTRAVENOUS at 07:45

## 2025-06-14 RX ADMIN — SODIUM CHLORIDE 1000 ML: 9 INJECTION, SOLUTION INTRAVENOUS at 07:42

## 2025-06-14 RX ADMIN — SODIUM CHLORIDE 1000 ML: 9 INJECTION, SOLUTION INTRAVENOUS at 10:19

## 2025-06-14 RX ADMIN — LORAZEPAM 0.5 MG: 2 INJECTION INTRAMUSCULAR; INTRAVENOUS at 10:19

## 2025-06-14 RX ADMIN — IOPAMIDOL 100 ML: 612 INJECTION, SOLUTION INTRAVENOUS at 08:18

## 2025-06-14 RX ADMIN — FENTANYL CITRATE 50 MCG: 0.05 INJECTION, SOLUTION INTRAMUSCULAR; INTRAVENOUS at 07:46

## 2025-06-14 NOTE — DISCHARGE INSTRUCTIONS
Instructions from Dr. Melissa:    It was a privilege being your ER physician today.    Please follow-up in clinic as we discussed.    Please return to the ER if you experience any worsening symptoms or for any other concerns.

## 2025-06-14 NOTE — Clinical Note
[facility] EMERGENCY DEPARTMENT  [address] [address]  Phone: [phone]    Gunjan Engel was seen and treated in our emergency department on 6/14/2025.  She may return to work on 06/16/2025.         Thank you for choosing [facility].    Piyush Melissa,

## 2025-06-14 NOTE — ED PROVIDER NOTES
"     [facility]  Emergency Department Encounter  Emergency Medicine Physician Note       Pt Name: Gunjan Engel  MRN: [id number]  Pt :   1990  Room Number:    Date of encounter:  2025  PCP: Sujatha Wang MD  ED Physician: Piyush Melissa,     HPI:  Gunjan Engel is a 34 y.o. female who presents to the ED with abdominal pain, nausea vomiting diarrhea, vaginal bleeding.  Patient reports symptoms started about 1 week ago.  Symptoms started with nausea, vomiting, diarrhea.  Family members are sick with similar illness.  Over the past 24 hours patient reports that she developed her period and has had significant vaginal bleeding and left lower quadrant abdominal pain.  Duration constant.  Rated severe and severity.  Has went through approximately 8 pads over the past 24 hours.  No fever, chills, chest pain or shortness of breath, problems with urination, vaginal discharge.    Pertinent medical history: PCOS, endometriosis, kidney stone    Pertinent surgical history:  x 2, diagnostic laparoscopy, myomectomy    PAST MEDICAL HISTORY  Past Medical History:   Diagnosis Date    Anxiety     Arrhythmia     reported hx of \"arrhythmia\" by PCP. No cardiac eval.    Asthma     uses albuterol fairly often.    Bulimia nervosa     Chronic back pain     PRN tylenol    Depression     Diabetes mellitus     Dyspepsia     with greasy foods    Endometriosis determined by laparoscopy     History of 8-9 laparoscopies Dr. Evgeny Houser     tums PRN. No egd or h pylori    History of COVID-19 2021    History of uterine fibroid     ,     Hypertension     H/O    Migraine     Ovarian cyst     PCOS (polycystic ovarian syndrome)     Post partum depression 2016    Seasonal allergies      Current Outpatient Medications   Medication Instructions    albuterol sulfate HFA (Ventolin HFA) 108 (90 Base) MCG/ACT inhaler 2 puffs, Inhalation, Every 4 Hours PRN    " amphetamine-dextroamphetamine (ADDERALL) 10 MG tablet TAKE 1 TABLET BY MOUTH EVERY AFTERNOON    amphetamine-dextroamphetamine XR (ADDERALL XR) 20 MG 24 hr capsule No dose, route, or frequency recorded.    EPINEPHrine (EPIPEN IJ) Injection    FLUoxetine (PROZAC) 40 mg, Oral, Daily    ibuprofen (ADVIL,MOTRIN) 800 mg, Oral, Every 8 Hours PRN    Mounjaro 2.5 MG/0.5ML solution pen-injector pen Subcutaneous    ondansetron ODT (ZOFRAN-ODT) 4 mg, Oral, Every 8 Hours PRN      PAST SURGICAL HISTORY  Past Surgical History:   Procedure Laterality Date     SECTION Bilateral 2017    Procedure:  SECTION REPEAT;  Surgeon: Cheikh Bonilla MD;  Location:  [facility] LABOR DELIVERY;  Service:      SECTION N/A 10/01/2021    Procedure:  SECTION REPEAT;  Surgeon: Luzmaria Forbes MD;  Location:  [facility] LABOR DELIVERY;  Service: Obstetrics/Gynecology;  Laterality: N/A;    DIAGNOSTIC LAPAROSCOPY      TIMES 8- for endometriosis; [id number]    MYOMECTOMY  ,        FAMILY HISTORY  Family History   Problem Relation Age of Onset    Cancer Mother     Heart disease Mother     Hypertension Mother     Diabetes Mother     Ovarian cancer Mother 36    Obesity Mother     Hypertension Father     Colon cancer Father 40    Sleep apnea Father     Obesity Sister     Obesity Sister     Drug abuse Brother     Hypertension Maternal Grandmother     Diabetes Maternal Grandmother     Obesity Maternal Grandmother     Cancer Paternal Grandmother     Osteoporosis Paternal Grandmother     Breast cancer Maternal Aunt 40    Ovarian cancer Other        SOCIAL HISTORY  Social History     Socioeconomic History    Marital status: Legally    Tobacco Use    Smoking status: Never    Smokeless tobacco: Never   Vaping Use    Vaping status: Never Used   Substance and Sexual Activity    Alcohol use: Yes     Alcohol/week: 1.0 standard drink of alcohol     Types: 1 Glasses of wine per week     Comment: occasional, glass of wine every  couple weeks    Drug use: Never    Sexual activity: Yes     Partners: Male     Birth control/protection: Tubal ligation     ALLERGIES  Hydrocodone, Latex, Methylene blue, Mushroom extract complex (obsolete), Percocet [oxycodone-acetaminophen], Shellfish-derived products, Adhesive tape, and Codeine    REVIEW OF SYSTEMS  All systems reviewed and negative except for those discussed in HPI.     PHYSICAL EXAM  ED Triage Vitals [06/14/25 0707]   Temp Heart Rate Resp BP SpO2   97.6 °F (36.4 °C) (!) 143 16 110/78 100 %      Temp src Heart Rate Source Patient Position BP Location FiO2 (%)   Axillary Monitor Sitting Left arm --     I have reviewed the triage vital signs and nursing notes.    General: Alert.  Nontoxic appearance.  No acute distress.  Head: Normocephalic.  Atraumatic.  Eyes: No scleral icterus.  ENT: Dry mucous membranes.  Cardiovascular: Tachycardic.  Regular rhythm.  No murmurs.  No rubs.  2+ distal pulses bilaterally.  Respiratory: No wheezing. No rales.  No rhonchi.  GI: Abdomen is soft.  Nondistended.  + Tenderness left lower quadrant.  No rebound.  No guarding.  No CVA tenderness.  MSK: Moves all 4 extremities.  Neurologic: Oriented x 3.  No focal deficits.  Skin: No edema. No erythema. No pallor. No cyanosis.  Psych: Normal mood and affect.    LAB RESULTS  Recent Results (from the past 24 hours)   Green Top (Gel)    Collection Time: 06/14/25  7:19 AM   Result Value Ref Range    Extra Tube Hold for add-ons.    Lavender Top    Collection Time: 06/14/25  7:19 AM   Result Value Ref Range    Extra Tube hold for add-on    Gold Top - SST    Collection Time: 06/14/25  7:19 AM   Result Value Ref Range    Extra Tube Hold for add-ons.    Light Blue Top    Collection Time: 06/14/25  7:19 AM   Result Value Ref Range    Extra Tube Hold for add-ons.    Lipase    Collection Time: 06/14/25  7:19 AM    Specimen: Blood   Result Value Ref Range    Lipase 18 13 - 60 U/L   Comprehensive Metabolic Panel    Collection Time:  06/14/25  7:19 AM    Specimen: Blood   Result Value Ref Range    Glucose 177 (H) 65 - 99 mg/dL    BUN 8.0 6.0 - 20.0 mg/dL    Creatinine 0.69 0.57 - 1.00 mg/dL    Sodium 138 136 - 145 mmol/L    Potassium 3.8 3.5 - 5.2 mmol/L    Chloride 102 98 - 107 mmol/L    CO2 22.2 22.0 - 29.0 mmol/L    Calcium 9.6 8.6 - 10.5 mg/dL    Total Protein 7.1 6.0 - 8.5 g/dL    Albumin 4.1 3.5 - 5.2 g/dL    ALT (SGPT) 13 1 - 33 U/L    AST (SGOT) 21 1 - 32 U/L    Alkaline Phosphatase 61 39 - 117 U/L    Total Bilirubin 0.5 0.0 - 1.2 mg/dL    Globulin 3.0 gm/dL    A/G Ratio 1.4 g/dL    BUN/Creatinine Ratio 11.6 7.0 - 25.0    Anion Gap 13.8 5.0 - 15.0 mmol/L    eGFR 116.2 >60.0 mL/min/1.73   hCG, Serum, Qualitative    Collection Time: 06/14/25  7:19 AM    Specimen: Blood   Result Value Ref Range    HCG Qualitative Negative Negative   CBC Auto Differential    Collection Time: 06/14/25  7:19 AM    Specimen: Blood   Result Value Ref Range    WBC 15.92 (H) 3.40 - 10.80 10*3/mm3    RBC 4.35 3.77 - 5.28 10*6/mm3    Hemoglobin 13.9 12.0 - 15.9 g/dL    Hematocrit 40.6 34.0 - 46.6 %    MCV 93.3 79.0 - 97.0 fL    MCH 32.0 26.6 - 33.0 pg    MCHC 34.2 31.5 - 35.7 g/dL    RDW 12.0 (L) 12.3 - 15.4 %    RDW-SD 41.5 37.0 - 54.0 fl    MPV 8.7 6.0 - 12.0 fL    Platelets 243 140 - 450 10*3/mm3    Neutrophil % 84.3 (H) 42.7 - 76.0 %    Lymphocyte % 12.4 (L) 19.6 - 45.3 %    Monocyte % 2.5 (L) 5.0 - 12.0 %    Eosinophil % 0.1 (L) 0.3 - 6.2 %    Basophil % 0.3 0.0 - 1.5 %    Immature Grans % 0.4 0.0 - 0.5 %    Neutrophils, Absolute 13.42 (H) 1.70 - 7.00 10*3/mm3    Lymphocytes, Absolute 1.98 0.70 - 3.10 10*3/mm3    Monocytes, Absolute 0.40 0.10 - 0.90 10*3/mm3    Eosinophils, Absolute 0.01 0.00 - 0.40 10*3/mm3    Basophils, Absolute 0.05 0.00 - 0.20 10*3/mm3    Immature Grans, Absolute 0.06 (H) 0.00 - 0.05 10*3/mm3    nRBC 0.0 0.0 - 0.2 /100 WBC   POC Glucose Once    Collection Time: 06/14/25  7:20 AM    Specimen: Blood   Result Value Ref Range    Glucose 121 70  - 130 mg/dL   Urinalysis With Microscopic If Indicated (No Culture) - Urine, Clean Catch    Collection Time: 06/14/25 10:37 AM    Specimen: Urine, Clean Catch   Result Value Ref Range    Color, UA Yellow Yellow, Straw    Appearance, UA Clear Clear    pH, UA 8.0 5.0 - 8.0    Specific Gravity, UA >1.030 (H) 1.005 - 1.030    Glucose, UA Negative Negative    Ketones, UA Negative Negative    Bilirubin, UA Negative Negative    Blood, UA Large (3+) (A) Negative    Protein, UA Negative Negative    Leuk Esterase, UA Negative Negative    Nitrite, UA Negative Negative    Urobilinogen, UA 1.0 E.U./dL 0.2 - 1.0 E.U./dL   Urinalysis, Microscopic Only - Urine, Clean Catch    Collection Time: 06/14/25 10:37 AM    Specimen: Urine, Clean Catch   Result Value Ref Range    RBC, UA 6-10 (A) None Seen, 0-2 /HPF    WBC, UA None Seen None Seen, 0-2 /HPF    Bacteria, UA None Seen None Seen /HPF    Squamous Epithelial Cells, UA 0-2 None Seen, 0-2 /HPF    Hyaline Casts, UA None Seen None Seen /LPF    Methodology Manual Light Microscopy        RADIOLOGY  US Non-ob Transvaginal  Result Date: 6/14/2025  PROCEDURE: US NON-OB TRANSVAGINAL-  HISTORY: r/o ovarian torsion  PROCEDURE:  Sonographic images of the pelvis were obtained transvaginally.  FINDINGS: The uterus measures 8.3 x 4.0 x 5.2 cm. There is no intrauterine gestation. The endometrium is 8 millimeters. Right ovary measures 3.0 x 3.2 x 2.7 cm. The left ovary measures 2.0 x 1.8 x 1.1 cm. There is a simple appearing right renal cyst measuring up to 2.9 cm.. Both ovaries demonstrate appropriate blood flow. Small amount of free fluid is present in the cul-de-sac. Correlate clinically with beta-hCG to exclude the possibility of early versus ectopic gestation.       No evidence of torsion.  2.9 cm right adnexal cyst.    This report was signed and finalized on 6/14/2025 11:28 AM by Black Restrepo DO.      CT Abdomen Pelvis With Contrast  Result Date: 6/14/2025  PROCEDURE: CT ABDOMEN PELVIS W  CONTRAST-  HISTORY: [facility] abdominal pain  COMPARISON: September 12, 2024.  PROCEDURE: The patient was injected with IV contrast.  Axial images were obtained from the lung bases to the pubic symphysis by computed tomography.  FINDINGS:  ABDOMEN: The heart is normal size. Gallbladder is present. The liver, spleen pancreas and adrenal glands are unremarkable. There is no hydronephrosis. No abnormally dilated loops of bowel. Mild to moderate retained stool is noted, correlate for constipation.  PELVIS: The appendix is not definitively identified. There is a right adnexal 3 cm cystic lesion, pelvic ultrasound may be of additional benefit. The uterus is present. No significant fluid collections in the pelvis. The bladder is decompressed. No acute osseous changes. There are extradural defects, the largest at L3-4 which may represent herniated disc. Correlate clinically and MRI of the lumbar spine may be of additional benefit.      Moderate retained stool is noted, correlate for constipation.  3 cm right adnexal cystic lesion, pelvic ultrasound may be of benefit.  There are extradural defects, the largest at L3-4 which may represent herniated disc. Correlate clinically and MRI of the lumbar spine may be of additional benefit. These have been present since the April 15, 2024 exam.    This study was performed with techniques to keep radiation doses as low as reasonably achievable (ALARA). Individualized dose reduction techniques using automated exposure control or adjustment of mA and/or kV according to the patient size were employed.   CTDI: 4.3 mGy DLP:184.89 mGy.cm   This report was signed and finalized on 6/14/2025 9:42 AM by Black Restrepo DO.        PROCEDURES  Procedures    RISK STRATIFICATION    MEDICAL DECISION MAKING  34 y.o. female with past medical history listed above who presents with left lower quad abdominal pain, nausea vomiting diarrhea, vaginal bleeding.    Vital signs remarkable for tachycardia, otherwise  within normal limits.  Afebrile.  Pulse ox 100% on room air.    Based on clinical presentation and physical exam, differential diagnosis includes, but is not limited to, gastroenteritis, colitis, renal colic, ectopic pregnancy, ovarian torsion, ovarian cyst, uterine fibroid.    I have discussed the indication, risk, and alternatives of the following high risk medications: IV fentanyl    At least 3 different tests have been ordered on this patient.    Medications administered in ED:  Medications   sodium chloride 0.9 % flush 10 mL (has no administration in time range)   ketorolac (TORADOL) injection 30 mg (30 mg Intravenous Given 6/14/25 0745)   fentaNYL citrate (PF) (SUBLIMAZE) injection 50 mcg (50 mcg Intravenous Given 6/14/25 0746)   ondansetron (ZOFRAN) injection 4 mg (4 mg Intravenous Given 6/14/25 0744)   sodium chloride 0.9 % bolus 1,000 mL (0 mL Intravenous Stopped 6/14/25 0956)   iopamidol (ISOVUE-300) 61 % injection 100 mL (100 mL Intravenous Given 6/14/25 0818)   LORazepam (ATIVAN) injection 0.5 mg (0.5 mg Intravenous Given 6/14/25 1019)   sodium chloride 0.9 % bolus 1,000 mL (1,000 mL Intravenous New Bag 6/14/25 1019)   famotidine (PEPCID) injection 20 mg (20 mg Intravenous Given 6/14/25 1040)     Please see ED course below for my interpretation of the ED workup.  ED Course as of 06/14/25 1142   Sat Jun 14, 2025   0728 ECG 12 Lead Tachycardia  EKG per my interpretation sinus tachycardia, rate 137, normal axis, no STEMI, normal QRS QTC intervals.   [JS]   0801 Patient reportedly told nursing staff that she ingested CBD gummy around 12 AM, which made symptoms worse. [JS]   0945 I reviewed the labs listed above.     Notable findings are highlighted below.    Old laboratory data was reviewed from the medical records and compared to today's results.   [JS]   0945 WBC(!): 15.92 [JS]   0945 Glucose(!): 177 [JS]   0945 CT Abdomen Pelvis With Contrast  I have independently reviewed and interpreted the CT abdomen  pelvis.  My interpretation is negative for small bowel obstruction.    Radiologist notes the following:     Moderate retained stool is noted, correlate for constipation.  3 cm right adnexal cystic lesion, pelvic ultrasound may be of benefit.  There are extradural defects, the largest at L3-4 which may represent herniated disc. Correlate clinically and MRI of the lumbar spine may be of additional benefit. These have been present since the April 15, 2024 exam.    [JS]   1131 US Non-ob Transvaginal  Radiology reports reviewed.     US Non-ob Transvaginal  Result Date: 6/14/2025  No evidence of torsion.  2.9 cm right adnexal cyst.    This report was signed and finalized on 6/14/2025 11:28 AM by Black Restrepo DO.   [JS]   1137 Cardiac telemetry was reviewed and interpreted by me. Reveals per my interpretation normal sinus rhythm at a rate of 88.   [JS]      ED Course User Index  [JS] Piyush Melissa DO     On re-evaluation, patient resting comfortably.  States symptoms have improved following therapy. Repeat abdominal exam performed.  Abdomen remains soft, nondistended, nonsurgical in nature. Tachycardia normalized. Vital signs remained stable on room air.  Patient was ambulatory in the ED with steady gait.  Able to tolerate oral intake appropriately.    I discussed the findings of the ED workup with the patient and her mother at bedside. Patient was also informed of incidental findings on imaging listed above. No clinical indication for admission. I recommended outpatient follow-up with PCP/gynecology.  Patient was deemed medically stable for discharge with close outpatient follow-up and strict ED return precautions. Patient agreeable with plan and disposition.    Home medications were reviewed.  Prescriptions for discharge: Ibuprofen, Zofran    Chronic conditions affecting care: PCOS    Social determinants of health impacting treatment or disposition: None    REPEAT VITAL SIGNS  AS OF 11:42 EDT VITALS:  BP - 115/83  HR  - 118  TEMP - 97.6 °F (36.4 °C) (Axillary)  O2 SATS - 100%    DIAGNOSIS  Final diagnoses:   Lower abdominal pain   Nausea vomiting and diarrhea   Vaginal bleeding   Right ovarian cyst     DISPOSITION  ED Disposition       ED Disposition   Discharge    Condition   Stable    Comment   --               PATIENT REFERRALS  Sujatha Wang MD  [address]  [facility] [address]  [facility] [id number] [phone]          Luzmaria Forbes MD  [address] [facility] [address] [place], [place] [id number] [phone]      OB/GYN.  3-5 days.          Please note that portions of this document were completed with voice recognition software.        Piyush Melissa,   06/14/25 1145

## 2025-06-16 ENCOUNTER — TELEPHONE (OUTPATIENT)
Dept: OBSTETRICS AND GYNECOLOGY | Facility: CLINIC | Age: 35
End: 2025-06-16
Payer: COMMERCIAL

## 2025-06-16 DIAGNOSIS — N83.201 RIGHT OVARIAN CYST: Primary | ICD-10-CM

## 2025-06-16 NOTE — TELEPHONE ENCOUNTER
Caller: Gunjan Engel    Relationship to patient: Self    Best call back number: [phone]    Chief complaint: NEEDS ED FOLLOW-UP WITHIN 3-5 DAYS     Type of visit: ED FOLLOW-UP- RIGHT OVARIAN CYST     Requested date: 3-5 DAYS

## 2025-06-16 NOTE — TELEPHONE ENCOUNTER
"Patient of Dr. Forbes; LOV 10/18/2022. Patient was scheduled here 04/10/24 but was \"no show\".   Returned patient's call.   Patient was seen in [facility] ER 06/14/25:   Final diagnoses:   Lower abdominal pain   Nausea vomiting and diarrhea   Vaginal bleeding   Right ovarian cyst   HCG was negative.   States she is still having pain in RLQ with nausea. States pain is intermittent and severe intensity.   ER note recommendation was follow up with GYN in 3-5 days.   Appointment scheduled here for 06/18/25 with ultrasound. Advised patient to return to ER if pain becomes unmanageable.   Patient v/u and agreed.   "

## (undated) DEVICE — SUT GUT CHRM 2/0 CT1 27IN 811H

## (undated) DEVICE — PROXIMATE RH ROTATING HEAD SKIN STAPLERS (35 WIDE) CONTAINS 35 STAINLESS STEEL STAPLES: Brand: PROXIMATE

## (undated) DEVICE — TRY SPINE BLCK WHITACRE 25G 3X5IN

## (undated) DEVICE — GLV SURG BIOGEL LTX PF 6

## (undated) DEVICE — PK C/SECT 10

## (undated) DEVICE — SUT GUT CHRM 1 CTX 36IN 905H

## (undated) DEVICE — COATED VICRYL  (POLYGLACTIN 910) SUTURE, VIOLET BRAIDED, STERILE, SYNTHETIC ABSORBABLE SUTURE: Brand: COATED VICRYL

## (undated) DEVICE — SYR ART BLD GAS HEP 1CC

## (undated) DEVICE — SOL IRR NACL 0.9PCT BT 1000ML

## (undated) DEVICE — SUT VIC 2/0 CT1 27IN J339H BX/36

## (undated) DEVICE — SOL IRR H2O BTL 1000ML STRL

## (undated) DEVICE — 39" SINGLE PATIENT USE HOVERMATT: Brand: SINGLE PATIENT USE HOVERMATT

## (undated) DEVICE — TUBING WITH WAND

## (undated) DEVICE — NDL HYPO ECLPS SFTY 18G 1 1/2IN

## (undated) DEVICE — SUT PLAIN  3/0 CT1 27IN 842H

## (undated) DEVICE — GLV SURG BIOGEL LTX PF 7 1/2

## (undated) DEVICE — ENSEAL X1 TISSUE SEALER, CURVED JAW, 25 CM SHAFT LENGTH: Brand: ENSEAL

## (undated) DEVICE — SUT PDS 0 CTX 36IN DYED Z370T

## (undated) DEVICE — MAT PREVALON MOBL TRANSFR AIR WO/PAD 39X80IN